# Patient Record
Sex: MALE | Race: WHITE | Employment: UNEMPLOYED
[De-identification: names, ages, dates, MRNs, and addresses within clinical notes are randomized per-mention and may not be internally consistent; named-entity substitution may affect disease eponyms.]

---

## 2017-02-07 ENCOUNTER — TELEPHONE (OUTPATIENT)
Dept: FAMILY MEDICINE CLINIC | Facility: CLINIC | Age: 27
End: 2017-02-07

## 2017-02-10 ENCOUNTER — OFFICE VISIT (OUTPATIENT)
Dept: FAMILY MEDICINE CLINIC | Facility: CLINIC | Age: 27
End: 2017-02-10

## 2017-02-10 VITALS
HEART RATE: 72 BPM | SYSTOLIC BLOOD PRESSURE: 127 MMHG | WEIGHT: 205.2 LBS | BODY MASS INDEX: 26.35 KG/M2 | DIASTOLIC BLOOD PRESSURE: 82 MMHG | RESPIRATION RATE: 16 BRPM | OXYGEN SATURATION: 97 %

## 2017-02-10 DIAGNOSIS — S12.9XXD: ICD-10-CM

## 2017-02-10 DIAGNOSIS — M54.12 CERVICAL RADICULOPATHY: Primary | ICD-10-CM

## 2017-02-10 PROCEDURE — 99213 OFFICE O/P EST LOW 20 MIN: CPT | Performed by: FAMILY MEDICINE

## 2017-02-10 RX ORDER — FLUOCINOLONE ACETONIDE 0.1 MG/ML
SOLUTION TOPICAL
Qty: 1 BOTTLE | Refills: 0 | Status: SHIPPED | OUTPATIENT
Start: 2017-02-10 | End: 2017-09-15

## 2017-02-10 RX ORDER — CLONIDINE HYDROCHLORIDE 0.2 MG/1
TABLET ORAL
Qty: 30 TABLET | Refills: 5 | Status: SHIPPED | OUTPATIENT
Start: 2017-02-10 | End: 2017-09-15

## 2017-02-10 RX ORDER — SERTRALINE HYDROCHLORIDE 100 MG/1
TABLET, FILM COATED ORAL
Qty: 60 TABLET | Refills: 3 | Status: SHIPPED | OUTPATIENT
Start: 2017-02-10 | End: 2022-07-21 | Stop reason: SDUPTHER

## 2017-02-10 RX ORDER — DIVALPROEX SODIUM 500 MG/1
1500 TABLET, DELAYED RELEASE ORAL DAILY
Qty: 90 TABLET | Refills: 3 | Status: SHIPPED | OUTPATIENT
Start: 2017-02-10 | End: 2017-09-15

## 2017-02-10 ASSESSMENT — ENCOUNTER SYMPTOMS
SHORTNESS OF BREATH: 0
BACK PAIN: 0
CONSTIPATION: 0
BLOOD IN STOOL: 0
WHEEZING: 0
COUGH: 0
ABDOMINAL PAIN: 0
DIARRHEA: 0

## 2017-03-13 DIAGNOSIS — S12.9XXD: ICD-10-CM

## 2017-03-13 DIAGNOSIS — M54.12 CERVICAL RADICULOPATHY: ICD-10-CM

## 2017-04-05 ENCOUNTER — OFFICE VISIT (OUTPATIENT)
Dept: FAMILY MEDICINE CLINIC | Age: 27
End: 2017-04-05
Payer: MEDICARE

## 2017-04-05 VITALS
HEART RATE: 83 BPM | WEIGHT: 199 LBS | BODY MASS INDEX: 25.55 KG/M2 | DIASTOLIC BLOOD PRESSURE: 70 MMHG | SYSTOLIC BLOOD PRESSURE: 130 MMHG

## 2017-04-05 DIAGNOSIS — Z82.49 FAMILY HISTORY OF ANEURYSM: Primary | ICD-10-CM

## 2017-04-05 DIAGNOSIS — M54.12 CERVICAL RADICULOPATHY DUE TO TRAUMA: ICD-10-CM

## 2017-04-05 PROCEDURE — 99213 OFFICE O/P EST LOW 20 MIN: CPT | Performed by: FAMILY MEDICINE

## 2017-04-05 ASSESSMENT — ENCOUNTER SYMPTOMS
ABDOMINAL PAIN: 0
CONSTIPATION: 0
COUGH: 0
BLOOD IN STOOL: 0
BACK PAIN: 1
WHEEZING: 0
DIARRHEA: 0
SHORTNESS OF BREATH: 0

## 2017-04-07 ENCOUNTER — TELEPHONE (OUTPATIENT)
Dept: FAMILY MEDICINE CLINIC | Age: 27
End: 2017-04-07

## 2017-04-11 ENCOUNTER — HOSPITAL ENCOUNTER (OUTPATIENT)
Dept: NON INVASIVE DIAGNOSTICS | Age: 27
Discharge: HOME | End: 2017-04-11
Payer: MEDICARE

## 2017-04-11 LAB
LV EF: 63 %
LVEF MODALITY: NORMAL

## 2017-04-11 PROCEDURE — 93306 TTE W/DOPPLER COMPLETE: CPT

## 2017-05-09 ENCOUNTER — INITIAL CONSULT (OUTPATIENT)
Dept: NEUROSURGERY | Age: 27
End: 2017-05-09
Payer: MEDICARE

## 2017-05-09 VITALS
DIASTOLIC BLOOD PRESSURE: 75 MMHG | WEIGHT: 206 LBS | SYSTOLIC BLOOD PRESSURE: 113 MMHG | HEART RATE: 69 BPM | BODY MASS INDEX: 26.44 KG/M2 | HEIGHT: 74 IN

## 2017-05-09 DIAGNOSIS — M54.2 NECK PAIN: Primary | ICD-10-CM

## 2017-05-09 PROCEDURE — 99243 OFF/OP CNSLTJ NEW/EST LOW 30: CPT | Performed by: NEUROLOGICAL SURGERY

## 2017-06-02 ENCOUNTER — OFFICE VISIT (OUTPATIENT)
Dept: FAMILY MEDICINE CLINIC | Age: 27
End: 2017-06-02
Payer: MEDICARE

## 2017-06-02 VITALS
BODY MASS INDEX: 25.94 KG/M2 | DIASTOLIC BLOOD PRESSURE: 64 MMHG | SYSTOLIC BLOOD PRESSURE: 118 MMHG | HEART RATE: 88 BPM | WEIGHT: 202 LBS

## 2017-06-02 DIAGNOSIS — R10.13 EPIGASTRIC PAIN: Primary | ICD-10-CM

## 2017-06-02 PROCEDURE — 99213 OFFICE O/P EST LOW 20 MIN: CPT | Performed by: FAMILY MEDICINE

## 2017-06-02 ASSESSMENT — PATIENT HEALTH QUESTIONNAIRE - PHQ9
1. LITTLE INTEREST OR PLEASURE IN DOING THINGS: 2
2. FEELING DOWN, DEPRESSED OR HOPELESS: 3
SUM OF ALL RESPONSES TO PHQ QUESTIONS 1-9: 18
3. TROUBLE FALLING OR STAYING ASLEEP: 2
5. POOR APPETITE OR OVEREATING: 1
8. MOVING OR SPEAKING SO SLOWLY THAT OTHER PEOPLE COULD HAVE NOTICED. OR THE OPPOSITE, BEING SO FIGETY OR RESTLESS THAT YOU HAVE BEEN MOVING AROUND A LOT MORE THAN USUAL: 2
9. THOUGHTS THAT YOU WOULD BE BETTER OFF DEAD, OR OF HURTING YOURSELF: 0
7. TROUBLE CONCENTRATING ON THINGS, SUCH AS READING THE NEWSPAPER OR WATCHING TELEVISION: 3
6. FEELING BAD ABOUT YOURSELF - OR THAT YOU ARE A FAILURE OR HAVE LET YOURSELF OR YOUR FAMILY DOWN: 3
10. IF YOU CHECKED OFF ANY PROBLEMS, HOW DIFFICULT HAVE THESE PROBLEMS MADE IT FOR YOU TO DO YOUR WORK, TAKE CARE OF THINGS AT HOME, OR GET ALONG WITH OTHER PEOPLE: 1
4. FEELING TIRED OR HAVING LITTLE ENERGY: 2
SUM OF ALL RESPONSES TO PHQ9 QUESTIONS 1 & 2: 5

## 2017-06-02 ASSESSMENT — ENCOUNTER SYMPTOMS
COUGH: 0
CONSTIPATION: 0
WHEEZING: 0
NAUSEA: 1
SHORTNESS OF BREATH: 0
BLOOD IN STOOL: 0
BACK PAIN: 0
DIARRHEA: 1
VOMITING: 1
ABDOMINAL PAIN: 0

## 2017-09-15 ENCOUNTER — OFFICE VISIT (OUTPATIENT)
Dept: FAMILY MEDICINE CLINIC | Age: 27
End: 2017-09-15
Payer: MEDICARE

## 2017-09-15 VITALS
HEIGHT: 74 IN | BODY MASS INDEX: 25.28 KG/M2 | RESPIRATION RATE: 18 BRPM | HEART RATE: 88 BPM | DIASTOLIC BLOOD PRESSURE: 90 MMHG | SYSTOLIC BLOOD PRESSURE: 120 MMHG | OXYGEN SATURATION: 96 % | WEIGHT: 197 LBS | TEMPERATURE: 98.3 F

## 2017-09-15 DIAGNOSIS — Z76.89 ENCOUNTER TO ESTABLISH CARE: ICD-10-CM

## 2017-09-15 DIAGNOSIS — Z13.31 POSITIVE DEPRESSION SCREENING: ICD-10-CM

## 2017-09-15 DIAGNOSIS — F11.11: Primary | ICD-10-CM

## 2017-09-15 PROCEDURE — G8431 POS CLIN DEPRES SCRN F/U DOC: HCPCS | Performed by: NURSE PRACTITIONER

## 2017-09-15 PROCEDURE — 99213 OFFICE O/P EST LOW 20 MIN: CPT | Performed by: NURSE PRACTITIONER

## 2017-09-15 RX ORDER — GABAPENTIN 100 MG/1
CAPSULE ORAL
Refills: 0 | COMMUNITY
Start: 2017-08-18 | End: 2017-09-15

## 2017-09-15 RX ORDER — NALTREXONE 380 MG
KIT INTRAMUSCULAR
COMMUNITY
Start: 2017-08-03 | End: 2017-09-15

## 2017-09-15 RX ORDER — GABAPENTIN 300 MG/1
CAPSULE ORAL
Refills: 0 | COMMUNITY
Start: 2017-09-08 | End: 2017-11-10 | Stop reason: DRUGHIGH

## 2017-09-15 ASSESSMENT — ENCOUNTER SYMPTOMS
SHORTNESS OF BREATH: 0
COUGH: 0
NAUSEA: 0
DIARRHEA: 0
ABDOMINAL PAIN: 0
CONSTIPATION: 0
CHEST TIGHTNESS: 0

## 2017-10-13 ENCOUNTER — OFFICE VISIT (OUTPATIENT)
Dept: FAMILY MEDICINE CLINIC | Age: 27
End: 2017-10-13
Payer: MEDICARE

## 2017-10-13 DIAGNOSIS — F19.20 POLYSUBSTANCE (EXCLUDING OPIOIDS) DEPENDENCE (HCC): Primary | ICD-10-CM

## 2017-10-13 LAB
AMPHETAMINE SCREEN, URINE: NORMAL
BARBITURATE SCREEN, URINE: NORMAL
BENZODIAZEPINE SCREEN, URINE: NORMAL
COCAINE METABOLITE SCREEN URINE: NORMAL
MDMA URINE: NORMAL
METHADONE SCREEN, URINE: NORMAL
METHAMPHETAMINE, URINE: NORMAL
OPIATE SCREEN URINE: NORMAL
OXYCODONE SCREEN URINE: NORMAL
PHENCYCLIDINE SCREEN URINE: NORMAL
PROPOXYPHENE SCREEN, URINE: NORMAL
THC: NORMAL
TRICYCLIC ANTIDEPRESSANTS, UR: NORMAL

## 2017-10-13 PROCEDURE — 80305 DRUG TEST PRSMV DIR OPT OBS: CPT | Performed by: NURSE PRACTITIONER

## 2017-10-13 PROCEDURE — G8428 CUR MEDS NOT DOCUMENT: HCPCS | Performed by: NURSE PRACTITIONER

## 2017-10-13 PROCEDURE — 96372 THER/PROPH/DIAG INJ SC/IM: CPT | Performed by: NURSE PRACTITIONER

## 2017-10-13 PROCEDURE — G8417 CALC BMI ABV UP PARAM F/U: HCPCS | Performed by: NURSE PRACTITIONER

## 2017-10-13 NOTE — PROGRESS NOTES
After obtaining consent, and per orders of Jaylen Sanz, injection of vivitrol given in Left vaentrogluteal by Bridgett Briscoe. Patient instructed to remain in clinic for 20 minutes afterwards, and to report any adverse reaction to me immediately.

## 2017-11-10 ENCOUNTER — OFFICE VISIT (OUTPATIENT)
Dept: FAMILY MEDICINE CLINIC | Age: 27
End: 2017-11-10
Payer: MEDICARE

## 2017-11-10 VITALS
TEMPERATURE: 97.4 F | SYSTOLIC BLOOD PRESSURE: 132 MMHG | HEIGHT: 74 IN | HEART RATE: 91 BPM | BODY MASS INDEX: 26.31 KG/M2 | DIASTOLIC BLOOD PRESSURE: 86 MMHG | OXYGEN SATURATION: 96 % | RESPIRATION RATE: 20 BRPM | WEIGHT: 205 LBS

## 2017-11-10 DIAGNOSIS — F11.11 OPIOID ABUSE, IN REMISSION (HCC): Primary | ICD-10-CM

## 2017-11-10 LAB
AMPHETAMINE SCREEN, URINE: ABNORMAL
BARBITURATE SCREEN, URINE: ABNORMAL
BENZODIAZEPINE SCREEN, URINE: ABNORMAL
COCAINE METABOLITE SCREEN URINE: ABNORMAL
MDMA URINE: ABNORMAL
METHADONE SCREEN, URINE: ABNORMAL
METHAMPHETAMINE, URINE: ABNORMAL
OPIATE SCREEN URINE: ABNORMAL
OXYCODONE SCREEN URINE: ABNORMAL
PHENCYCLIDINE SCREEN URINE: ABNORMAL
PROPOXYPHENE SCREEN, URINE: ABNORMAL
THC: ABNORMAL
TRICYCLIC ANTIDEPRESSANTS, UR: ABNORMAL

## 2017-11-10 PROCEDURE — 99213 OFFICE O/P EST LOW 20 MIN: CPT | Performed by: NURSE PRACTITIONER

## 2017-11-10 PROCEDURE — G8484 FLU IMMUNIZE NO ADMIN: HCPCS | Performed by: NURSE PRACTITIONER

## 2017-11-10 PROCEDURE — G8427 DOCREV CUR MEDS BY ELIG CLIN: HCPCS | Performed by: NURSE PRACTITIONER

## 2017-11-10 PROCEDURE — 80305 DRUG TEST PRSMV DIR OPT OBS: CPT | Performed by: NURSE PRACTITIONER

## 2017-11-10 PROCEDURE — G8417 CALC BMI ABV UP PARAM F/U: HCPCS | Performed by: NURSE PRACTITIONER

## 2017-11-10 PROCEDURE — 4004F PT TOBACCO SCREEN RCVD TLK: CPT | Performed by: NURSE PRACTITIONER

## 2017-11-10 RX ORDER — GABAPENTIN 600 MG/1
TABLET ORAL
Refills: 0 | COMMUNITY
Start: 2017-10-30 | End: 2022-05-26 | Stop reason: SDUPTHER

## 2017-11-10 RX ORDER — NALTREXONE 380 MG
KIT INTRAMUSCULAR
COMMUNITY
Start: 2017-10-26 | End: 2018-02-27 | Stop reason: SDUPTHER

## 2017-11-10 NOTE — PROGRESS NOTES
patient is not nervous/anxious. Other pertinent ROS in HPI  Objective:     /86 (Site: Right Arm, Position: Sitting, Cuff Size: Medium Adult)   Pulse 91   Temp 97.4 °F (36.3 °C) (Oral)   Resp 20   Ht 6' 2.02\" (1.88 m)   Wt 205 lb (93 kg)   SpO2 96%   BMI 26.31 kg/m²      Physical Exam   Constitutional: He is oriented to person, place, and time. He appears well-developed and well-nourished. HENT:   Head: Normocephalic and atraumatic. Right Ear: External ear normal.   Left Ear: External ear normal.   Nose: Nose normal.   Mouth/Throat: Oropharynx is clear and moist.   Eyes: Conjunctivae and EOM are normal. Pupils are equal, round, and reactive to light. Neck: Normal range of motion. Cardiovascular: Normal rate, regular rhythm and normal heart sounds. Pulmonary/Chest: Effort normal and breath sounds normal.   Abdominal: Soft. Bowel sounds are normal.   Musculoskeletal: Normal range of motion. Pain free ROM     Neurological: He is alert and oriented to person, place, and time. Gait normal     Skin: Skin is warm and dry. No rash noted. Psychiatric: He has a normal mood and affect. His behavior is normal. Judgment and thought content normal.       Assessment:       1. Opioid abuse, in remission  VIVITROL 380 MG injection       Plan:      1. Opioid abuse, in remission  Continue vivotrol  Continue going to racing for recovery  - VIVITROL 380 MG injection;     RTO if symptoms worsen or fail to improve  Pt agreeable with plan      Patient given educational materials - see patient instructions. Discussed use, benefit, and side effects of prescribed medications. All patient questions answered. Pt voiced understanding. Reviewed health maintenance. Instructed to continue current medications, diet and exercise. 1.  Dania received counseling on the following healthy behaviors: nutrition, exercise and medication adherence  2.   Patient given educational materials when available - see patient instructions when applicable  3. Discussed use, benefit, and side effects of prescribed medications. Barriers to medication compliance addressed. All patient questions answered. Pt voiced understanding. 4.  Reviewed prior labs and health maintenance  5. Continue current medications, diet and exercise.     Completed Refills   Requested Prescriptions      No prescriptions requested or ordered in this encounter         Electronically signed by Luciano Greenfield CNP on 11/10/2017 at 1:33 PM

## 2017-11-10 NOTE — PROGRESS NOTES
Have you seen any other physician or provider since your last visit no    Have you had any other diagnostic tests since your last visit? no    Have you changed or stopped any medications since your last visit including any over-the-counter medicines, vitamins, or herbal medicines? yno     Are you taking all your prescribed medications? Yes  If NO, why? -  n/a           Patient Self-Management Goal for this visit.    What is your goal for your visit today? - vivitrol   Barriers to success: none   Plan for overcoming my barriers: N/A      Confidence: 10/10   Date goal set: 11/10/17   Date expected to reach goal: 3days    Medical history Review  Past Medical, Family, and Social History reviewed and does not contribute to the patient presenting condition    Health Maintenance Due   Topic Date Due    HIV screen  03/11/2004    DTaP/Tdap/Td vaccine (1 - Tdap) 03/11/2008    Pneumococcal med risk (1 of 1 - PPSV23) 03/11/2008

## 2017-12-08 ENCOUNTER — NURSE ONLY (OUTPATIENT)
Dept: FAMILY MEDICINE CLINIC | Age: 27
End: 2017-12-08
Payer: MEDICARE

## 2017-12-08 VITALS — TEMPERATURE: 97.5 F

## 2017-12-08 DIAGNOSIS — F19.20 POLYSUBSTANCE (EXCLUDING OPIOIDS) DEPENDENCE (HCC): Primary | ICD-10-CM

## 2017-12-08 PROCEDURE — 96372 THER/PROPH/DIAG INJ SC/IM: CPT | Performed by: NURSE PRACTITIONER

## 2017-12-08 PROCEDURE — 80305 DRUG TEST PRSMV DIR OPT OBS: CPT | Performed by: NURSE PRACTITIONER

## 2017-12-08 NOTE — PROGRESS NOTES
After obtaining consent, and per orders of Talia Sánchez, injection of vivitrol given in left dorso gluteal by Delvis Echevarria. Patient instructed to remain in clinic for 20 minutes afterwards, and to report any adverse reaction to me immediately.

## 2018-01-05 ENCOUNTER — OFFICE VISIT (OUTPATIENT)
Dept: FAMILY MEDICINE CLINIC | Age: 28
End: 2018-01-05
Payer: MEDICARE

## 2018-01-05 VITALS
SYSTOLIC BLOOD PRESSURE: 132 MMHG | OXYGEN SATURATION: 97 % | HEART RATE: 64 BPM | TEMPERATURE: 97.4 F | RESPIRATION RATE: 20 BRPM | BODY MASS INDEX: 25.67 KG/M2 | WEIGHT: 200 LBS | HEIGHT: 74 IN | DIASTOLIC BLOOD PRESSURE: 78 MMHG

## 2018-01-05 DIAGNOSIS — K21.9 GASTROESOPHAGEAL REFLUX DISEASE, ESOPHAGITIS PRESENCE NOT SPECIFIED: ICD-10-CM

## 2018-01-05 DIAGNOSIS — F11.11 OPIOID ABUSE, IN REMISSION (HCC): Primary | ICD-10-CM

## 2018-01-05 PROCEDURE — G8427 DOCREV CUR MEDS BY ELIG CLIN: HCPCS | Performed by: NURSE PRACTITIONER

## 2018-01-05 PROCEDURE — 4004F PT TOBACCO SCREEN RCVD TLK: CPT | Performed by: NURSE PRACTITIONER

## 2018-01-05 PROCEDURE — G8417 CALC BMI ABV UP PARAM F/U: HCPCS | Performed by: NURSE PRACTITIONER

## 2018-01-05 PROCEDURE — G8484 FLU IMMUNIZE NO ADMIN: HCPCS | Performed by: NURSE PRACTITIONER

## 2018-01-05 PROCEDURE — 80305 DRUG TEST PRSMV DIR OPT OBS: CPT | Performed by: NURSE PRACTITIONER

## 2018-01-05 PROCEDURE — 99213 OFFICE O/P EST LOW 20 MIN: CPT | Performed by: NURSE PRACTITIONER

## 2018-01-05 RX ORDER — RANITIDINE 150 MG/1
150 TABLET ORAL NIGHTLY
Qty: 30 TABLET | Refills: 3 | Status: SHIPPED | OUTPATIENT
Start: 2018-01-05 | End: 2018-03-02 | Stop reason: SDUPTHER

## 2018-01-05 ASSESSMENT — ENCOUNTER SYMPTOMS
CONSTIPATION: 0
DIARRHEA: 0
COUGH: 0
SHORTNESS OF BREATH: 0
ABDOMINAL PAIN: 0
BLOOD IN STOOL: 0

## 2018-01-05 NOTE — PROGRESS NOTES
Have you seen any other physician or provider since your last visit yes - Psych    Have you had any other diagnostic tests since your last visit? no    Have you changed or stopped any medications since your last visit including any over-the-counter medicines, vitamins, or herbal medicines? no     Are you taking all your prescribed medications? Yes  If NO, why? -  N/A           Patient Self-Management Goal for this visit.    What is your goal for your visit today? - 28 day follow up/injection   Barriers to success: none   Plan for overcoming my barriers: N/A      Confidence: 10/10   Date goal set: 1/5/18   Date expected to reach goal: 2days    Medical history Review  Past Medical, Family, and Social History reviewed and does not contribute to the patient presenting condition    Health Maintenance Due   Topic Date Due    HIV screen  03/11/2004    DTaP/Tdap/Td vaccine (1 - Tdap) 03/11/2008    Pneumococcal med risk (1 of 1 - PPSV23) 03/11/2008    Flu vaccine (1) 09/01/2017

## 2018-02-02 ENCOUNTER — NURSE ONLY (OUTPATIENT)
Dept: FAMILY MEDICINE CLINIC | Age: 28
End: 2018-02-02
Payer: MEDICARE

## 2018-02-02 DIAGNOSIS — F19.20 POLYSUBSTANCE (EXCLUDING OPIOIDS) DEPENDENCE (HCC): Primary | ICD-10-CM

## 2018-02-02 PROCEDURE — 96372 THER/PROPH/DIAG INJ SC/IM: CPT | Performed by: NURSE PRACTITIONER

## 2018-02-02 PROCEDURE — 80305 DRUG TEST PRSMV DIR OPT OBS: CPT | Performed by: NURSE PRACTITIONER

## 2018-02-02 NOTE — PROGRESS NOTES
After obtaining consent, and per orders of Dr. Belgica Morgan, injection of vivitrol given in right gluteus  by Dhruv Delaney. Patient instructed to remain in clinic for 20 minutes afterwards, and to report any adverse reaction to me immediately.

## 2018-02-27 DIAGNOSIS — F11.11 OPIOID ABUSE, IN REMISSION (HCC): ICD-10-CM

## 2018-02-27 RX ORDER — NALTREXONE 380 MG
380 KIT INTRAMUSCULAR ONCE
Qty: 1.2 EACH | Refills: 3 | Status: SHIPPED | OUTPATIENT
Start: 2018-02-27 | End: 2018-02-27

## 2018-02-27 NOTE — TELEPHONE ENCOUNTER
Next appt. 03/02/2018      Next Visit Date:  Future Appointments  Date Time Provider Nicolas dAen   3/2/2018 1:15 PM Lisette Rice, [facility] [address] [address] [employer] Maintenance   Topic Date Due    HIV screen  03/11/2004    DTaP/Tdap/Td vaccine (1 - Tdap) 03/11/2008    Pneumococcal med risk (1 of 1 - PPSV23) 03/11/2008    Flu vaccine (1) 09/01/2017       No results found for: LABA1C          ( goal A1C is < 7)   No results found for: LABMICR  LDL Calculated (mg/dL)   Date Value   09/21/2016 105       (goal LDL is <100)   AST (U/L)   Date Value   03/09/2016 31     ALT (U/L)   Date Value   03/09/2016 28     BUN (mg/dL)   Date Value   03/09/2016 17     BP Readings from Last 3 Encounters:   01/05/18 132/78   11/10/17 132/86   09/15/17 (!) 120/90          (goal 120/80)    All Future Testing planned in [facility]  Lab Frequency Next Occurrence   XR Cervical Spine Flexion and Extension Once 05/09/2017   CT CERVICAL SPINE WO CONTRAST Once 05/09/2017               Patient Active Problem List:     Respiratory insufficiency     Traumatic brain injury [facility])     PTSD (post-traumatic stress disorder)     Polysubstance (excluding opioids) dependence ([facility] [address]     Suicidal ideation     Depression

## 2018-03-02 ENCOUNTER — OFFICE VISIT (OUTPATIENT)
Dept: FAMILY MEDICINE CLINIC | Age: 28
End: 2018-03-02
Payer: MEDICARE

## 2018-03-02 VITALS
SYSTOLIC BLOOD PRESSURE: 134 MMHG | DIASTOLIC BLOOD PRESSURE: 78 MMHG | TEMPERATURE: 96.6 F | HEIGHT: 74 IN | BODY MASS INDEX: 25.93 KG/M2 | WEIGHT: 202 LBS | OXYGEN SATURATION: 98 % | HEART RATE: 73 BPM | RESPIRATION RATE: 20 BRPM

## 2018-03-02 DIAGNOSIS — K21.9 GASTROESOPHAGEAL REFLUX DISEASE, ESOPHAGITIS PRESENCE NOT SPECIFIED: ICD-10-CM

## 2018-03-02 DIAGNOSIS — F11.11 OPIOID ABUSE, IN REMISSION (HCC): Primary | ICD-10-CM

## 2018-03-02 PROCEDURE — 80305 DRUG TEST PRSMV DIR OPT OBS: CPT | Performed by: NURSE PRACTITIONER

## 2018-03-02 PROCEDURE — 99213 OFFICE O/P EST LOW 20 MIN: CPT | Performed by: NURSE PRACTITIONER

## 2018-03-02 PROCEDURE — 4004F PT TOBACCO SCREEN RCVD TLK: CPT | Performed by: NURSE PRACTITIONER

## 2018-03-02 PROCEDURE — G8427 DOCREV CUR MEDS BY ELIG CLIN: HCPCS | Performed by: NURSE PRACTITIONER

## 2018-03-02 PROCEDURE — G8417 CALC BMI ABV UP PARAM F/U: HCPCS | Performed by: NURSE PRACTITIONER

## 2018-03-02 PROCEDURE — G8484 FLU IMMUNIZE NO ADMIN: HCPCS | Performed by: NURSE PRACTITIONER

## 2018-03-02 RX ORDER — FLUOCINOLONE ACETONIDE 0.1 MG/ML
SOLUTION TOPICAL 2 TIMES DAILY
COMMUNITY
End: 2018-03-02 | Stop reason: SDUPTHER

## 2018-03-02 RX ORDER — RANITIDINE 150 MG/1
150 TABLET ORAL NIGHTLY
Qty: 30 TABLET | Refills: 5 | Status: SHIPPED | OUTPATIENT
Start: 2018-03-02 | End: 2019-02-25

## 2018-03-02 RX ORDER — FLUOCINOLONE ACETONIDE 0.1 MG/ML
SOLUTION TOPICAL 2 TIMES DAILY
Qty: 1 BOTTLE | Refills: 3 | Status: SHIPPED | OUTPATIENT
Start: 2018-03-02 | End: 2018-07-26

## 2018-03-02 RX ORDER — VENLAFAXINE HYDROCHLORIDE 37.5 MG/1
CAPSULE, EXTENDED RELEASE ORAL
Refills: 0 | COMMUNITY
Start: 2018-02-01 | End: 2018-05-07 | Stop reason: DRUGHIGH

## 2018-03-02 NOTE — PROGRESS NOTES
Visit Information    Have you changed or started any medications since your last visit including any over-the-counter medicines, vitamins, or herbal medicines? no   Have you stopped taking any of your medications? Is so, why? -  no  Are you having any side effects from any of your medications? - no    Have you seen any other physician or provider since your last visit? yes - psych   Have you had any other diagnostic tests since your last visit?  no   Have you been seen in the emergency room and/or had an admission in a hospital since we last saw you?  no   Have you had your routine dental cleaning in the past 6 months?  no     Do you have an active MyChart account? If no, what is the barrier?   Yes    Patient Care Team:  Sandra Seymour CNP as PCP - General (Certified Nurse Practitioner)    Medical History Review  Past Medical, Family, and Social History reviewed and does not contribute to the patient presenting condition    Health Maintenance   Topic Date Due    HIV screen  03/11/2004    DTaP/Tdap/Td vaccine (1 - Tdap) 03/11/2008    Pneumococcal med risk (1 of 1 - PPSV23) 03/11/2008    Flu vaccine (1) 09/01/2017

## 2018-03-05 ENCOUNTER — TELEPHONE (OUTPATIENT)
Dept: FAMILY MEDICINE CLINIC | Age: 28
End: 2018-03-05

## 2018-03-06 ENCOUNTER — TELEPHONE (OUTPATIENT)
Dept: FAMILY MEDICINE CLINIC | Age: 28
End: 2018-03-06

## 2018-04-02 ENCOUNTER — NURSE ONLY (OUTPATIENT)
Dept: FAMILY MEDICINE CLINIC | Age: 28
End: 2018-04-02
Payer: MEDICARE

## 2018-04-02 DIAGNOSIS — F11.11 OPIOID ABUSE, IN REMISSION (HCC): Primary | ICD-10-CM

## 2018-04-02 PROCEDURE — 80305 DRUG TEST PRSMV DIR OPT OBS: CPT | Performed by: NURSE PRACTITIONER

## 2018-04-02 PROCEDURE — 96372 THER/PROPH/DIAG INJ SC/IM: CPT | Performed by: NURSE PRACTITIONER

## 2018-04-30 ENCOUNTER — HOSPITAL ENCOUNTER (OUTPATIENT)
Age: 28
Setting detail: SPECIMEN
Discharge: HOME | End: 2018-04-30
Payer: MEDICARE

## 2018-04-30 ENCOUNTER — OFFICE VISIT (OUTPATIENT)
Dept: FAMILY MEDICINE CLINIC | Age: 28
End: 2018-04-30
Payer: MEDICARE

## 2018-04-30 VITALS
HEIGHT: 74 IN | DIASTOLIC BLOOD PRESSURE: 81 MMHG | WEIGHT: 202.4 LBS | TEMPERATURE: 97 F | BODY MASS INDEX: 25.98 KG/M2 | SYSTOLIC BLOOD PRESSURE: 117 MMHG | HEART RATE: 87 BPM

## 2018-04-30 DIAGNOSIS — M79.675 CHRONIC TOE PAIN, LEFT FOOT: ICD-10-CM

## 2018-04-30 DIAGNOSIS — I72.0 CAROTID ANEURYSM, LEFT (HCC): ICD-10-CM

## 2018-04-30 DIAGNOSIS — F11.11 OPIOID ABUSE, IN REMISSION (HCC): ICD-10-CM

## 2018-04-30 DIAGNOSIS — G89.29 CHRONIC NECK PAIN: Primary | ICD-10-CM

## 2018-04-30 DIAGNOSIS — G89.29 CHRONIC TOE PAIN, LEFT FOOT: ICD-10-CM

## 2018-04-30 DIAGNOSIS — M54.2 CHRONIC NECK PAIN: Primary | ICD-10-CM

## 2018-04-30 LAB
6-ACETYLMORPHINE, UR: NORMAL
ALBUMIN SERPL-MCNC: 4.7 G/DL (ref 3.5–5.2)
ALBUMIN/GLOBULIN RATIO: 1.4 (ref 1–2.5)
ALP BLD-CCNC: 95 U/L (ref 40–129)
ALT SERPL-CCNC: 42 U/L (ref 5–41)
AMPHETAMINE SCREEN, URINE: NORMAL
ANION GAP SERPL CALCULATED.3IONS-SCNC: 12 MMOL/L (ref 9–17)
AST SERPL-CCNC: 35 U/L
BARBITURATE SCREEN, URINE: NORMAL
BENZODIAZEPINE SCREEN, URINE: NORMAL
BILIRUB SERPL-MCNC: 0.2 MG/DL (ref 0.3–1.2)
BUN BLDV-MCNC: 14 MG/DL (ref 6–20)
BUN/CREAT BLD: ABNORMAL (ref 9–20)
CALCIUM SERPL-MCNC: 9.5 MG/DL (ref 8.6–10.4)
CANNABINOID SCREEN URINE: POSITIVE
CHLORIDE BLD-SCNC: 103 MMOL/L (ref 98–107)
CO2: 23 MMOL/L (ref 20–31)
COCAINE METABOLITE, URINE: NORMAL
CREAT SERPL-MCNC: 0.75 MG/DL (ref 0.7–1.2)
CREATININE URINE: NORMAL MG/DL
EDDP, URINE: NORMAL
ETHANOL URINE: NORMAL
GFR AFRICAN AMERICAN: >60 ML/MIN
GFR NON-AFRICAN AMERICAN: >60 ML/MIN
GFR SERPL CREATININE-BSD FRML MDRD: ABNORMAL ML/MIN/{1.73_M2}
GFR SERPL CREATININE-BSD FRML MDRD: ABNORMAL ML/MIN/{1.73_M2}
GLUCOSE BLD-MCNC: 87 MG/DL (ref 70–99)
HCT VFR BLD CALC: 46.7 % (ref 40.7–50.3)
HEMOGLOBIN: 15 G/DL (ref 13–17)
MCH RBC QN AUTO: 29 PG (ref 25.2–33.5)
MCHC RBC AUTO-ENTMCNC: 32.1 G/DL (ref 28.4–34.8)
MCV RBC AUTO: 90.3 FL (ref 82.6–102.9)
MDMA URINE: NORMAL
METHADONE SCREEN, URINE: NORMAL
METHAMPHETAMINE, URINE: NORMAL
NRBC AUTOMATED: 0 PER 100 WBC
OPIATES, URINE: NORMAL
OXYCODONE: NORMAL
PCP: NORMAL
PDW BLD-RTO: 14.6 % (ref 11.8–14.4)
PH, URINE: NORMAL
PHENCYCLIDINE, URINE: NORMAL
PLATELET # BLD: 499 K/UL (ref 138–453)
PMV BLD AUTO: 11 FL (ref 8.1–13.5)
POTASSIUM SERPL-SCNC: 4.8 MMOL/L (ref 3.7–5.3)
PROPOXYPHENE, URINE: NORMAL
RBC # BLD: 5.17 M/UL (ref 4.21–5.77)
SODIUM BLD-SCNC: 138 MMOL/L (ref 135–144)
TOTAL PROTEIN: 8.1 G/DL (ref 6.4–8.3)
TRICYCLIC ANTIDEPRESSANTS, UR: NORMAL
URIC ACID: 5.4 MG/DL (ref 3.4–7)
WBC # BLD: 10.7 K/UL (ref 3.5–11.3)

## 2018-04-30 PROCEDURE — 4004F PT TOBACCO SCREEN RCVD TLK: CPT | Performed by: NURSE PRACTITIONER

## 2018-04-30 PROCEDURE — G8427 DOCREV CUR MEDS BY ELIG CLIN: HCPCS | Performed by: NURSE PRACTITIONER

## 2018-04-30 PROCEDURE — G8417 CALC BMI ABV UP PARAM F/U: HCPCS | Performed by: NURSE PRACTITIONER

## 2018-04-30 PROCEDURE — 99214 OFFICE O/P EST MOD 30 MIN: CPT | Performed by: NURSE PRACTITIONER

## 2018-04-30 RX ORDER — OMEPRAZOLE 20 MG/1
20 CAPSULE, DELAYED RELEASE ORAL DAILY
Qty: 30 CAPSULE | Refills: 3 | Status: SHIPPED | OUTPATIENT
Start: 2018-04-30 | End: 2019-04-26 | Stop reason: SDUPTHER

## 2018-04-30 ASSESSMENT — ENCOUNTER SYMPTOMS: BACK PAIN: 1

## 2018-05-07 ENCOUNTER — OFFICE VISIT (OUTPATIENT)
Dept: ORTHOPEDIC SURGERY | Age: 28
End: 2018-05-07
Payer: MEDICARE

## 2018-05-07 ENCOUNTER — HOSPITAL ENCOUNTER (OUTPATIENT)
Dept: GENERAL RADIOLOGY | Facility: CLINIC | Age: 28
Discharge: HOME | End: 2018-05-09
Payer: MEDICARE

## 2018-05-07 VITALS
HEIGHT: 74 IN | WEIGHT: 190 LBS | BODY MASS INDEX: 24.38 KG/M2 | SYSTOLIC BLOOD PRESSURE: 144 MMHG | HEART RATE: 82 BPM | DIASTOLIC BLOOD PRESSURE: 97 MMHG

## 2018-05-07 DIAGNOSIS — Z87.81 HISTORY OF CERVICAL FRACTURE: ICD-10-CM

## 2018-05-07 DIAGNOSIS — Z87.81 HISTORY OF CERVICAL FRACTURE: Primary | ICD-10-CM

## 2018-05-07 DIAGNOSIS — M53.2X2 CERVICAL SPINE INSTABILITY: ICD-10-CM

## 2018-05-07 PROCEDURE — 99203 OFFICE O/P NEW LOW 30 MIN: CPT | Performed by: FAMILY MEDICINE

## 2018-05-07 PROCEDURE — 72040 X-RAY EXAM NECK SPINE 2-3 VW: CPT

## 2018-05-07 PROCEDURE — G8427 DOCREV CUR MEDS BY ELIG CLIN: HCPCS | Performed by: FAMILY MEDICINE

## 2018-05-07 PROCEDURE — G8420 CALC BMI NORM PARAMETERS: HCPCS | Performed by: FAMILY MEDICINE

## 2018-05-07 PROCEDURE — 4004F PT TOBACCO SCREEN RCVD TLK: CPT | Performed by: FAMILY MEDICINE

## 2018-05-07 RX ORDER — NALTREXONE 380 MG
KIT INTRAMUSCULAR
COMMUNITY
Start: 2018-04-23 | End: 2018-05-29

## 2018-05-07 RX ORDER — VENLAFAXINE HYDROCHLORIDE 150 MG/1
CAPSULE, EXTENDED RELEASE ORAL
Refills: 0 | COMMUNITY
Start: 2018-04-03 | End: 2022-07-21

## 2018-05-14 ENCOUNTER — HOSPITAL ENCOUNTER (OUTPATIENT)
Dept: CT IMAGING | Age: 28
Discharge: HOME | End: 2018-05-16
Payer: MEDICARE

## 2018-05-14 DIAGNOSIS — I72.0 CAROTID ANEURYSM, LEFT (HCC): ICD-10-CM

## 2018-05-14 PROCEDURE — 2580000003 HC RX 258: Performed by: NURSE PRACTITIONER

## 2018-05-14 PROCEDURE — 6360000004 HC RX CONTRAST MEDICATION: Performed by: NURSE PRACTITIONER

## 2018-05-14 PROCEDURE — 70498 CT ANGIOGRAPHY NECK: CPT

## 2018-05-14 RX ORDER — SODIUM CHLORIDE 0.9 % (FLUSH) 0.9 %
10 SYRINGE (ML) INJECTION PRN
Status: DISCONTINUED | OUTPATIENT
Start: 2018-05-14 | End: 2018-05-17 | Stop reason: HOSPADM

## 2018-05-14 RX ORDER — 0.9 % SODIUM CHLORIDE 0.9 %
80 INTRAVENOUS SOLUTION INTRAVENOUS ONCE
Status: COMPLETED | OUTPATIENT
Start: 2018-05-14 | End: 2018-05-14

## 2018-05-14 RX ADMIN — Medication 10 ML: at 15:10

## 2018-05-14 RX ADMIN — SODIUM CHLORIDE 80 ML: 9 INJECTION, SOLUTION INTRAVENOUS at 15:09

## 2018-05-14 RX ADMIN — IOPAMIDOL 80 ML: 755 INJECTION, SOLUTION INTRAVENOUS at 15:09

## 2018-05-16 DIAGNOSIS — R93.89 ABNORMAL CT SCAN: Primary | ICD-10-CM

## 2018-05-17 ENCOUNTER — HOSPITAL ENCOUNTER (OUTPATIENT)
Dept: MRI IMAGING | Facility: CLINIC | Age: 28
Discharge: HOME | End: 2018-05-19
Payer: MEDICARE

## 2018-05-17 DIAGNOSIS — M53.2X2 CERVICAL SPINE INSTABILITY: ICD-10-CM

## 2018-05-17 DIAGNOSIS — M79.672 LEFT FOOT PAIN: Primary | ICD-10-CM

## 2018-05-17 DIAGNOSIS — Z87.81 HISTORY OF CERVICAL FRACTURE: ICD-10-CM

## 2018-05-17 PROCEDURE — 72141 MRI NECK SPINE W/O DYE: CPT

## 2018-05-22 ENCOUNTER — HOSPITAL ENCOUNTER (OUTPATIENT)
Dept: GENERAL RADIOLOGY | Facility: CLINIC | Age: 28
Discharge: HOME | End: 2018-05-24
Payer: MEDICARE

## 2018-05-22 ENCOUNTER — OFFICE VISIT (OUTPATIENT)
Dept: ORTHOPEDIC SURGERY | Age: 28
End: 2018-05-22
Payer: MEDICARE

## 2018-05-22 VITALS
HEART RATE: 89 BPM | HEIGHT: 74 IN | BODY MASS INDEX: 24.38 KG/M2 | DIASTOLIC BLOOD PRESSURE: 88 MMHG | WEIGHT: 190 LBS | SYSTOLIC BLOOD PRESSURE: 131 MMHG
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
## 2018-05-22 DIAGNOSIS — M53.2X2 CERVICAL SPINE INSTABILITY: Primary | ICD-10-CM

## 2018-05-22 DIAGNOSIS — S16.1XXA REPETITIVE STRAIN INJURY OF CERVICAL SPINE, INITIAL ENCOUNTER: ICD-10-CM

## 2018-05-22 DIAGNOSIS — M79.672 LEFT FOOT PAIN: ICD-10-CM

## 2018-05-22 PROCEDURE — G8420 CALC BMI NORM PARAMETERS: HCPCS | Performed by: FAMILY MEDICINE

## 2018-05-22 PROCEDURE — 99213 OFFICE O/P EST LOW 20 MIN: CPT | Performed by: FAMILY MEDICINE

## 2018-05-22 PROCEDURE — 4004F PT TOBACCO SCREEN RCVD TLK: CPT | Performed by: FAMILY MEDICINE

## 2018-05-22 PROCEDURE — G8427 DOCREV CUR MEDS BY ELIG CLIN: HCPCS | Performed by: FAMILY MEDICINE

## 2018-05-22 PROCEDURE — 73630 X-RAY EXAM OF FOOT: CPT

## 2018-05-29 ENCOUNTER — OFFICE VISIT (OUTPATIENT)
Dept: FAMILY MEDICINE CLINIC | Age: 28
End: 2018-05-29
Payer: MEDICARE

## 2018-05-29 VITALS
RESPIRATION RATE: 20 BRPM | HEIGHT: 74 IN | TEMPERATURE: 97.1 F | DIASTOLIC BLOOD PRESSURE: 80 MMHG | HEART RATE: 92 BPM | WEIGHT: 208 LBS | SYSTOLIC BLOOD PRESSURE: 124 MMHG | OXYGEN SATURATION: 96 % | BODY MASS INDEX: 26.69 KG/M2

## 2018-05-29 DIAGNOSIS — M79.672 CHRONIC FOOT PAIN, LEFT: ICD-10-CM

## 2018-05-29 DIAGNOSIS — G89.29 CHRONIC FOOT PAIN, LEFT: ICD-10-CM

## 2018-05-29 DIAGNOSIS — F11.11 OPIOID ABUSE, IN REMISSION (HCC): Primary | ICD-10-CM

## 2018-05-29 PROCEDURE — 4004F PT TOBACCO SCREEN RCVD TLK: CPT | Performed by: NURSE PRACTITIONER

## 2018-05-29 PROCEDURE — G8417 CALC BMI ABV UP PARAM F/U: HCPCS | Performed by: NURSE PRACTITIONER

## 2018-05-29 PROCEDURE — 99213 OFFICE O/P EST LOW 20 MIN: CPT | Performed by: NURSE PRACTITIONER

## 2018-05-29 PROCEDURE — 80305 DRUG TEST PRSMV DIR OPT OBS: CPT | Performed by: NURSE PRACTITIONER

## 2018-05-29 PROCEDURE — G8427 DOCREV CUR MEDS BY ELIG CLIN: HCPCS | Performed by: NURSE PRACTITIONER

## 2018-05-29 ASSESSMENT — ENCOUNTER SYMPTOMS: BACK PAIN: 1

## 2018-05-31 ENCOUNTER — OFFICE VISIT (OUTPATIENT)
Dept: NEUROLOGY | Age: 28
End: 2018-05-31
Payer: MEDICARE

## 2018-05-31 ENCOUNTER — HOSPITAL ENCOUNTER (OUTPATIENT)
Dept: PHYSICAL THERAPY | Facility: CLINIC | Age: 28
Setting detail: THERAPIES SERIES
Discharge: HOME | End: 2018-05-31
Payer: MEDICARE

## 2018-05-31 VITALS
BODY MASS INDEX: 26.56 KG/M2 | DIASTOLIC BLOOD PRESSURE: 86 MMHG | WEIGHT: 207 LBS | SYSTOLIC BLOOD PRESSURE: 122 MMHG | HEIGHT: 74 IN | HEART RATE: 99 BPM

## 2018-05-31 DIAGNOSIS — I67.1 ANEURYSM OF LEFT INTERNAL CAROTID ARTERY: Primary | ICD-10-CM

## 2018-05-31 PROCEDURE — 4004F PT TOBACCO SCREEN RCVD TLK: CPT | Performed by: PSYCHIATRY & NEUROLOGY

## 2018-05-31 PROCEDURE — 99204 OFFICE O/P NEW MOD 45 MIN: CPT | Performed by: PSYCHIATRY & NEUROLOGY

## 2018-05-31 PROCEDURE — 97110 THERAPEUTIC EXERCISES: CPT

## 2018-05-31 PROCEDURE — G8427 DOCREV CUR MEDS BY ELIG CLIN: HCPCS | Performed by: PSYCHIATRY & NEUROLOGY

## 2018-05-31 PROCEDURE — G8417 CALC BMI ABV UP PARAM F/U: HCPCS | Performed by: PSYCHIATRY & NEUROLOGY

## 2018-05-31 PROCEDURE — 97161 PT EVAL LOW COMPLEX 20 MIN: CPT

## 2018-05-31 ASSESSMENT — ENCOUNTER SYMPTOMS
VOMITING: 0
COUGH: 0
NAUSEA: 0
SHORTNESS OF BREATH: 0
VOICE CHANGE: 0
COLOR CHANGE: 0
PHOTOPHOBIA: 0

## 2018-06-01 ENCOUNTER — TELEPHONE (OUTPATIENT)
Dept: NEUROLOGY | Age: 28
End: 2018-06-01

## 2018-06-06 ENCOUNTER — HOSPITAL ENCOUNTER (OUTPATIENT)
Dept: PHYSICAL THERAPY | Facility: CLINIC | Age: 28
Setting detail: THERAPIES SERIES
Discharge: HOME | End: 2018-06-06
Payer: MEDICARE

## 2018-06-06 PROCEDURE — 97110 THERAPEUTIC EXERCISES: CPT

## 2018-06-12 ENCOUNTER — HOSPITAL ENCOUNTER (OUTPATIENT)
Dept: PHYSICAL THERAPY | Facility: CLINIC | Age: 28
Setting detail: THERAPIES SERIES
Discharge: HOME | End: 2018-06-12
Payer: MEDICARE

## 2018-06-12 PROCEDURE — 97110 THERAPEUTIC EXERCISES: CPT

## 2018-06-15 ENCOUNTER — HOSPITAL ENCOUNTER (OUTPATIENT)
Dept: PHYSICAL THERAPY | Facility: CLINIC | Age: 28
Setting detail: THERAPIES SERIES
Discharge: HOME | End: 2018-06-15
Payer: MEDICARE

## 2018-06-15 PROCEDURE — 97110 THERAPEUTIC EXERCISES: CPT

## 2018-06-27 ENCOUNTER — NURSE ONLY (OUTPATIENT)
Dept: FAMILY MEDICINE CLINIC | Age: 28
End: 2018-06-27
Payer: MEDICARE

## 2018-06-27 DIAGNOSIS — F11.11 OPIOID ABUSE, IN REMISSION (HCC): Primary | ICD-10-CM

## 2018-06-27 DIAGNOSIS — M25.512 LEFT SHOULDER PAIN, UNSPECIFIED CHRONICITY: ICD-10-CM

## 2018-06-27 LAB
AMPHETAMINE SCREEN, URINE: NEGATIVE
BARBITURATE SCREEN, URINE: NEGATIVE
BENZODIAZEPINE SCREEN, URINE: NEGATIVE
COCAINE METABOLITE SCREEN URINE: NEGATIVE
MDMA URINE: NEGATIVE
METHADONE SCREEN, URINE: NEGATIVE
METHAMPHETAMINE, URINE: NEGATIVE
OPIATE SCREEN URINE: NEGATIVE
OXYCODONE SCREEN URINE: NEGATIVE
PHENCYCLIDINE SCREEN URINE: NEGATIVE
PROPOXYPHENE SCREEN, URINE: NEGATIVE
THC: POSITIVE
TRICYCLIC ANTIDEPRESSANTS, UR: NEGATIVE

## 2018-06-27 PROCEDURE — 80305 DRUG TEST PRSMV DIR OPT OBS: CPT | Performed by: NURSE PRACTITIONER

## 2018-06-27 PROCEDURE — 96372 THER/PROPH/DIAG INJ SC/IM: CPT | Performed by: NURSE PRACTITIONER

## 2018-07-02 DIAGNOSIS — F11.11 OPIOID ABUSE, IN REMISSION (HCC): Primary | ICD-10-CM

## 2018-07-02 RX ORDER — NALTREXONE 380 MG
380 KIT INTRAMUSCULAR ONCE
Qty: 1.2 EACH | Refills: 5 | Status: SHIPPED | OUTPATIENT
Start: 2018-07-02 | End: 2018-07-02

## 2018-07-16 ENCOUNTER — HOSPITAL ENCOUNTER (OUTPATIENT)
Dept: PHYSICAL THERAPY | Facility: CLINIC | Age: 28
Setting detail: THERAPIES SERIES
Discharge: HOME | End: 2018-07-16
Payer: MEDICARE

## 2018-07-16 NOTE — FLOWSHEET NOTE
[] Tim Abreu        Outpatient Physical                Therapy       [address]       Phone: [phone]       Fax: [phone] [] [facility] at [address]       Phone: [phone]       Fax: [phone] [x] [place].  [address] [facility]     [address] [facility]      Phone: [phone]      Fax:  [phone] [facility]    [address] [facility] [address]  Phone [phone]  Fax  [phone]     Physical Therapy Cancel/No Show note    Date: 2018  Patient: Nick Luque  : 1990  MRN: [id number]    Cancels/No Shows to date:   For today's appointment patient:  []  Cancelled  []  Rescheduled appointment  []  No-show     Reason given by patient:  []  Patient ill  []  Conflicting appointment  []  No transportation    [x]  Conflict with work  []  No reason given  []  Weather related  []  Other:      Comments:      [x]  Next appointment was confirmed    Electronically signed by: Aimee López

## 2018-07-17 ENCOUNTER — HOSPITAL ENCOUNTER (OUTPATIENT)
Dept: PHYSICAL THERAPY | Facility: CLINIC | Age: 28
Setting detail: THERAPIES SERIES
Discharge: HOME | End: 2018-07-17
Payer: MEDICARE

## 2018-07-17 PROCEDURE — 97110 THERAPEUTIC EXERCISES: CPT

## 2018-07-17 NOTE — FLOWSHEET NOTE
needed. Avoid mobilization     Treatment Charges: Mins Units   []  Modalities     [x]  Ther Exercise 30 2   []  Manual Therapy     []  Ther Activities     []  Aquatics     []  Vasocompression     []  Other     Total Treatment time 30 2       Assessment: [x] Progressing toward goals. [] No change. [x] Other: Initiated stretches and exercises per log with good katherine. Pt is returning to therapy after a week off, with no progressions this date due to return to therapy. Pt needing VC's for proper form as well as exercise recall. No increase in pain post treatment. Pt noting he has script for shoulder and will call tomorrow to schedule eval.     STG: (to be met in 10 treatments)  1. ? Pain: Pt to decrease pain levels to 4/10 with ADLs  2. ? ROM: Increase flexibility throughout to ease ADL progression  3. ? Strength: Increase UE strength to 5/5 MMT throughout   4. Independent with Home Exercise Programs    LTG: (to be met in 20 treatments)  1. Improve score of functional assessment tool NDI from 48% impairment to less than 20% impairment   2. Decrease pain to 2-3/10 with ADLs     Patient goals: Decrease pain    Pt. Education:  [x] Yes  [] No  [] Reviewed Prior HEP/Ed  Method of Education: [x] Verbal  [x] Demo  [] Written  Comprehension of Education:  [x] Verbalizes understanding. [x] Demonstrates understanding. [] Needs review. [] Demonstrates/verbalizes HEP/Ed previously given. Plan: [x] Continue per plan of care.    [] Other:      Time In: 5:05pm            Time Out: 5:35    Electronically signed by:  Bill Urbano PTA

## 2018-07-26 ENCOUNTER — OFFICE VISIT (OUTPATIENT)
Dept: FAMILY MEDICINE CLINIC | Age: 28
End: 2018-07-26
Payer: MEDICARE

## 2018-07-26 VITALS
DIASTOLIC BLOOD PRESSURE: 80 MMHG | WEIGHT: 210.6 LBS | BODY MASS INDEX: 27.04 KG/M2 | SYSTOLIC BLOOD PRESSURE: 120 MMHG | OXYGEN SATURATION: 95 % | TEMPERATURE: 97.3 F | HEART RATE: 91 BPM

## 2018-07-26 DIAGNOSIS — F11.11 OPIOID ABUSE, IN REMISSION (HCC): Primary | ICD-10-CM

## 2018-07-26 LAB
AMPHETAMINE SCREEN, URINE: NEGATIVE
BARBITURATE SCREEN, URINE: NEGATIVE
BENZODIAZEPINE SCREEN, URINE: NEGATIVE
BUPRENORPHINE URINE: ABNORMAL
COCAINE METABOLITE SCREEN URINE: NEGATIVE
GABAPENTIN SCREEN, URINE: ABNORMAL
METHADONE SCREEN, URINE: NEGATIVE
METHAMPHETAMINE, URINE: NEGATIVE
OPIATE SCREEN URINE: ABNORMAL
OXYCODONE SCREEN URINE: NEGATIVE
PHENCYCLIDINE SCREEN URINE: NEGATIVE
PROPOXYPHENE SCREEN, URINE: ABNORMAL
THC SCREEN, URINE: POSITIVE
TRICYCLIC ANTIDEPRESSANTS, UR: NEGATIVE

## 2018-07-26 PROCEDURE — 96372 THER/PROPH/DIAG INJ SC/IM: CPT | Performed by: NURSE PRACTITIONER

## 2018-07-26 PROCEDURE — 80305 DRUG TEST PRSMV DIR OPT OBS: CPT | Performed by: NURSE PRACTITIONER

## 2018-07-26 PROCEDURE — G8417 CALC BMI ABV UP PARAM F/U: HCPCS | Performed by: NURSE PRACTITIONER

## 2018-07-26 PROCEDURE — 4004F PT TOBACCO SCREEN RCVD TLK: CPT | Performed by: NURSE PRACTITIONER

## 2018-07-26 PROCEDURE — G8427 DOCREV CUR MEDS BY ELIG CLIN: HCPCS | Performed by: NURSE PRACTITIONER

## 2018-07-26 PROCEDURE — 99213 OFFICE O/P EST LOW 20 MIN: CPT | Performed by: NURSE PRACTITIONER

## 2018-07-26 ASSESSMENT — ENCOUNTER SYMPTOMS: BACK PAIN: 1

## 2018-07-26 ASSESSMENT — PATIENT HEALTH QUESTIONNAIRE - PHQ9
1. LITTLE INTEREST OR PLEASURE IN DOING THINGS: 0
SUM OF ALL RESPONSES TO PHQ9 QUESTIONS 1 & 2: 0
SUM OF ALL RESPONSES TO PHQ QUESTIONS 1-9: 0
2. FEELING DOWN, DEPRESSED OR HOPELESS: 0
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
## 2018-08-06 ENCOUNTER — HOSPITAL ENCOUNTER (OUTPATIENT)
Dept: PHYSICAL THERAPY | Facility: CLINIC | Age: 28
Setting detail: THERAPIES SERIES
Discharge: HOME | End: 2018-08-06
Payer: MEDICARE

## 2018-08-06 PROCEDURE — 97161 PT EVAL LOW COMPLEX 20 MIN: CPT

## 2018-08-06 PROCEDURE — 97110 THERAPEUTIC EXERCISES: CPT

## 2018-08-09 ENCOUNTER — HOSPITAL ENCOUNTER (OUTPATIENT)
Dept: PHYSICAL THERAPY | Facility: CLINIC | Age: 28
Setting detail: THERAPIES SERIES
Discharge: HOME | End: 2018-08-09
Payer: MEDICARE

## 2018-08-09 PROCEDURE — 97110 THERAPEUTIC EXERCISES: CPT

## 2018-08-09 NOTE — FLOWSHEET NOTE
[] [facility]       Outpatient Physical        Therapy       [address]       Phone: [phone]       Fax: [phone] [] [facility] at [address]       Phone: [phone]       Fax: [phone] [x] [place]. [facility]   Phone: [phone]   Fax:  [phone]     Physical Therapy Daily Treatment Note    Date:  2018  Patient: Guy Phillip                   : 1990                      MRN: [id number]  Physician: TENZIN Pulido NP                                 Insurance: [employer]  Medical Diagnosis: LUE Shoulder Pain                   Rehab Codes: M25.512  Onset Date:                    Next 's appt. :   Visit# / total visits:   Cancels/No Shows:     Subjective:  Pt arrived noting moderated L shoulder pain, stating pain is in posterior aspect of shoulder as well as the joint itself. Pain:  [x] Yes  [] No Location: L shoulder Pain Rating: (0-10 scale) 4-5/10  Pain altered Tx:  [] No  [x] Yes  Action: rest  Comments:    Todays Treatment:     Exercises:  Exercise Reps/ Time Weight/ Level Comments   UBE  6'       pec stretch 3x30\"  pec minor and major              *Wand flex  15x5\"       *Wand Abd  15x5\"       *Wall walks  15x5\"       *Table slides                    TBand Rows  2x10  green     TBand Ext  2x10  green     TBand HAB  2x10  red     TBand ER/IR  2x10 yellow                          Other:        Specific Instructions for next treatment:      Treatment Charges: Mins Units   []  Modalities     [x]  Ther Exercise 40 3   []  Manual Therapy     []  Ther Activities     []  Aquatics     []  Vasocompression     []  Other     Total Treatment time 40 3       Assessment: [x] Progressing toward goals. Initiated stretches and exercise per log with fair katherine. Pt displaying difficulty with Tband ER/IR this date as well as HAB.  VC's for proper form

## 2018-08-13 ENCOUNTER — HOSPITAL ENCOUNTER (OUTPATIENT)
Dept: PHYSICAL THERAPY | Facility: CLINIC | Age: 28
Setting detail: THERAPIES SERIES
Discharge: HOME | End: 2018-08-13
Payer: MEDICARE

## 2018-08-13 PROCEDURE — 97110 THERAPEUTIC EXERCISES: CPT

## 2018-08-13 NOTE — FLOWSHEET NOTE
[] [facility]       Outpatient Physical        Therapy       [address]       Phone: [phone]       Fax: [phone] [] [facility] at [address] [facility]       Phone: [phone]       Fax: [phone] [x] [place]. [address] [facility]  [address] [facility] [address]   Phone: [phone]   Fax:  [phone]     Physical Therapy Daily Treatment Note    Date:  2018  Patient: Nini Guajardo                   : 1990                      MRN: [id number]  Physician: TENZIN Hernandez NP                                 Insurance: [employer]  Medical Diagnosis: LUE Shoulder Pain                   Rehab Codes: M25.512  Onset Date:                    Next 's appt. :   Visit# / total visits: 3/20  Cancels/No Shows:     Subjective:  Pt arrived still having moderate pain in L shoulder, stating he is going to see his Dr and requesting and MRI. Pt unsure when Dr appointment was at this time.    Pain:  [x] Yes  [] No Location: L shoulder Pain Rating: (0-10 scale) 4-5/10  Pain altered Tx:  [] No  [x] Yes  Action: rest  Comments:    Todays Treatment:     Exercises:  Exercise Reps/ Time Weight/ Level Comments   UBE  6'       pec stretch 3x30\"  pec minor and major              *Wand flex  15x5\"       *Wand Abd  15x5\"       *Wall walks  15x5\"       *Table slides   15x5\"       Supine punches  20x 1#    abc's 2x 1#    SL flexion  1#    SL abduction  1#    SL ER  1#              TBand Rows  2x10  green     TBand Ext  2x10  green     TBand HAB  2x10  red     TBand IR  2x10 yellow      TBand ER 5x5  yellow   due to fatigue              Other:        Specific Instructions for next treatment:      Treatment Charges: Mins Units   []  Modalities     [x]  Ther Exercise 40 3   []  Manual Therapy     []  Ther Activities     []  Aquatics     []  Vasocompression     []  Other     Total Treatment time 40 3       Assessment: [x] Progressing toward goals. Progressed pt with adding supine punches and abc's, and SL exercises with good katherine to new exercises. Pt noting some increased pain as well as some tingling and burning sensation from L shoulder to L elbow hand region during Tband ER/IR this date. Pt stating he feels he tore something in shoulder and wants to get an MRI just to figure this all out. Will continue to progress as katherine. [] No change. [] Other:    STG: (to be met in 10 treatments)  1. ? Pain: Pt to decrease pain levels to 2/10  2. ? ROM: Increase flexibility throughout to ease ADL progression  3. ? Strength: Increase mobility and strength to 5/5 MMT  4. Independent with Home Exercise Programs     LTG: (to be met in 20 treatments)  1. Improve score of functional assessment tool QUICK DASH to less than 15% impairment   2. Decrease pain to 0/10     Patient goals: decrease shoulder pain     Pt. Education:  [x] Yes  [] No  [x] Reviewed Prior HEP/Ed Issued HEP and bands. Method of Education: [x] Verbal  [] Demo  [x] Written  Comprehension of Education:  [x] Verbalizes understanding. [] Demonstrates understanding. [] Needs review. [] Demonstrates/verbalizes HEP/Ed previously given. Plan: [] Continue per plan of care.    [] Other:      Time In: 2:50           Time Out: 3:36    Electronically signed by:  Brooke Calderon PTA

## 2018-08-16 ENCOUNTER — HOSPITAL ENCOUNTER (OUTPATIENT)
Dept: PHYSICAL THERAPY | Facility: CLINIC | Age: 28
Setting detail: THERAPIES SERIES
Discharge: HOME | End: 2018-08-16
Payer: MEDICARE

## 2018-08-20 ENCOUNTER — HOSPITAL ENCOUNTER (OUTPATIENT)
Dept: PHYSICAL THERAPY | Facility: CLINIC | Age: 28
Setting detail: THERAPIES SERIES
Discharge: HOME | End: 2018-08-20
Payer: MEDICARE

## 2018-08-20 NOTE — FLOWSHEET NOTE
[] [facility]        Outpatient Physical                Therapy       [address]       Phone: [phone]       Fax: [phone] [] [facility]       Promotion at [address] [facility]       Phone: [phone]       Fax: [phone] [x] [facility]. [place]      for Health Promotion     [address] [facility]      Phone: [phone]      Fax:  [phone] [facility]    [address] [facility],   [facility] [address]  Phone [phone]  Fax  [phone]     Physical Therapy Cancel/No Show note    Date: 2018  Patient: Guy Phillip  : 1990  MRN: [id number]    Cancels/No Shows to date:   For today's appointment patient:  [x]  Cancelled  []  Rescheduled appointment  [x]  No-show     Reason given by patient:  []  Patient ill  []  Conflicting appointment  []  No transportation    []  Conflict with work  []  No reason given  []  Weather related  []  Other:      Comments:       []  Next appointment was confirmed: tried to leave message but unable due to improper number given.      Electronically signed by: Eura Hodgkin, PTA

## 2018-08-23 ENCOUNTER — NURSE ONLY (OUTPATIENT)
Dept: FAMILY MEDICINE CLINIC | Age: 28
End: 2018-08-23
Payer: MEDICARE

## 2018-08-23 ENCOUNTER — APPOINTMENT (OUTPATIENT)
Dept: PHYSICAL THERAPY | Facility: CLINIC | Age: 28
End: 2018-08-23
Payer: MEDICARE

## 2018-08-23 DIAGNOSIS — G89.29 CHRONIC PAIN OF RIGHT KNEE: ICD-10-CM

## 2018-08-23 DIAGNOSIS — M25.561 CHRONIC PAIN OF RIGHT KNEE: ICD-10-CM

## 2018-08-23 DIAGNOSIS — G89.29 CHRONIC LEFT SHOULDER PAIN: Primary | ICD-10-CM

## 2018-08-23 DIAGNOSIS — M25.512 CHRONIC LEFT SHOULDER PAIN: Primary | ICD-10-CM

## 2018-08-23 DIAGNOSIS — F11.11 OPIOID ABUSE, IN REMISSION (HCC): ICD-10-CM

## 2018-08-23 LAB
AMPHETAMINE SCREEN, URINE: NEGATIVE
BARBITURATE SCREEN, URINE: NEGATIVE
BENZODIAZEPINE SCREEN, URINE: NEGATIVE
BUPRENORPHINE URINE: NEGATIVE
COCAINE METABOLITE SCREEN URINE: NEGATIVE
GABAPENTIN SCREEN, URINE: NEGATIVE
METHADONE SCREEN, URINE: NEGATIVE
METHAMPHETAMINE, URINE: NEGATIVE
OPIATE SCREEN URINE: NEGATIVE
OXYCODONE SCREEN URINE: NEGATIVE
PHENCYCLIDINE SCREEN URINE: NEGATIVE
PROPOXYPHENE SCREEN, URINE: NEGATIVE
THC SCREEN, URINE: POSITIVE
TRICYCLIC ANTIDEPRESSANTS, UR: NEGATIVE

## 2018-08-23 PROCEDURE — 80305 DRUG TEST PRSMV DIR OPT OBS: CPT | Performed by: NURSE PRACTITIONER

## 2018-08-23 PROCEDURE — 96372 THER/PROPH/DIAG INJ SC/IM: CPT | Performed by: NURSE PRACTITIONER

## 2018-08-31 ENCOUNTER — HOSPITAL ENCOUNTER (OUTPATIENT)
Dept: MRI IMAGING | Facility: CLINIC | Age: 28
Discharge: HOME | End: 2018-09-02
Payer: MEDICARE

## 2018-08-31 DIAGNOSIS — M25.512 CHRONIC LEFT SHOULDER PAIN: ICD-10-CM

## 2018-08-31 DIAGNOSIS — G89.29 CHRONIC LEFT SHOULDER PAIN: ICD-10-CM

## 2018-08-31 PROCEDURE — 73221 MRI JOINT UPR EXTREM W/O DYE: CPT

## 2018-09-04 DIAGNOSIS — M25.512 CHRONIC LEFT SHOULDER PAIN: Primary | ICD-10-CM

## 2018-09-04 DIAGNOSIS — R93.6 ABNORMAL MAGNETIC RESONANCE IMAGING OF SHOULDER: ICD-10-CM

## 2018-09-04 DIAGNOSIS — G89.29 CHRONIC LEFT SHOULDER PAIN: Primary | ICD-10-CM

## 2018-09-11 DIAGNOSIS — M25.512 LEFT SHOULDER PAIN, UNSPECIFIED CHRONICITY: Primary | ICD-10-CM

## 2018-09-26 ENCOUNTER — OFFICE VISIT (OUTPATIENT)
Dept: FAMILY MEDICINE CLINIC | Age: 28
End: 2018-09-26
Payer: MEDICARE

## 2018-09-26 VITALS
BODY MASS INDEX: 27.19 KG/M2 | HEART RATE: 84 BPM | OXYGEN SATURATION: 95 % | WEIGHT: 211.8 LBS | TEMPERATURE: 97.2 F | DIASTOLIC BLOOD PRESSURE: 88 MMHG | SYSTOLIC BLOOD PRESSURE: 130 MMHG

## 2018-09-26 DIAGNOSIS — F11.11 OPIOID ABUSE, IN REMISSION (HCC): Primary | ICD-10-CM

## 2018-09-26 DIAGNOSIS — Z79.899 HIGH RISK MEDICATION USE: ICD-10-CM

## 2018-09-26 DIAGNOSIS — Z02.71 DISABILITY EXAMINATION: ICD-10-CM

## 2018-09-26 LAB
AMPHETAMINE SCREEN, URINE: NEGATIVE
BARBITURATE SCREEN, URINE: NEGATIVE
BENZODIAZEPINE SCREEN, URINE: NEGATIVE
BUPRENORPHINE URINE: NORMAL
COCAINE METABOLITE SCREEN URINE: NEGATIVE
GABAPENTIN SCREEN, URINE: NORMAL
METHADONE SCREEN, URINE: NEGATIVE
METHAMPHETAMINE, URINE: NEGATIVE
OPIATE SCREEN URINE: NORMAL
OXYCODONE SCREEN URINE: NEGATIVE
PHENCYCLIDINE SCREEN URINE: NEGATIVE
PROPOXYPHENE SCREEN, URINE: NORMAL
THC SCREEN, URINE: POSITIVE
TRICYCLIC ANTIDEPRESSANTS, UR: NEGATIVE

## 2018-09-26 PROCEDURE — 99213 OFFICE O/P EST LOW 20 MIN: CPT | Performed by: NURSE PRACTITIONER

## 2018-09-26 PROCEDURE — 4004F PT TOBACCO SCREEN RCVD TLK: CPT | Performed by: NURSE PRACTITIONER

## 2018-09-26 PROCEDURE — G8427 DOCREV CUR MEDS BY ELIG CLIN: HCPCS | Performed by: NURSE PRACTITIONER

## 2018-09-26 PROCEDURE — G8417 CALC BMI ABV UP PARAM F/U: HCPCS | Performed by: NURSE PRACTITIONER

## 2018-09-26 PROCEDURE — 80305 DRUG TEST PRSMV DIR OPT OBS: CPT | Performed by: NURSE PRACTITIONER

## 2018-09-26 NOTE — PROGRESS NOTES
Patient is present for vivitrol injection    Patient has paperwork for disability  Patient will need a [place] and medication reviewed with patient    Visit Information    Have you changed or started any medications since your last visit including any over-the-counter medicines, vitamins, or herbal medicines? no   Have you stopped taking any of your medications? Is so, why? -  no  Are you having any side effects from any of your medications? - no    Have you seen any other physician or provider since your last visit? yes - psych   Have you had any other diagnostic tests since your last visit?  no   Have you been seen in the emergency room and/or had an admission in a hospital since we last saw you?  no   Have you had your routine dental cleaning in the past 6 months?  no     Do you have an active MyChart account? If no, what is the barrier?   Yes    Patient Care Team:  PHOEBE Gayle CNP as PCP - General (Certified Nurse Practitioner)  PHOEBE Gayle CNP as PCP - S Attributed Provider    Medical History Review  Past Medical, Family, and Social History reviewed and does contribute to the patient presenting condition    Health Maintenance   Topic Date Due    Flu vaccine (1) 09/01/2018    DTaP/Tdap/Td vaccine (1 - Tdap) 07/26/2019 (Originally 3/11/2008)    Pneumococcal med risk (1 of 1 - PPSV23) 07/26/2019 (Originally 3/11/2008)    HIV screen  07/26/2019 (Originally 3/11/2004)
exercise. 1.  Dania received counseling on the following healthy behaviors: nutrition, exercise and medication adherence  2. Patient given educational materials when available - see patient instructions when applicable  3. Discussed use, benefit, and side effects of prescribed medications. Barriers to medication compliance addressed. All patient questions answered. Pt voiced understanding. 4.  Reviewed prior labs and health maintenance  5. Continue current medications, diet and exercise.     Completed Refills   Requested Prescriptions      No prescriptions requested or ordered in this encounter         Electronically signed by Maria Esther Dominguez CNP on 9/26/2018 at 10:09 AM

## 2018-11-01 ENCOUNTER — OFFICE VISIT (OUTPATIENT)
Dept: FAMILY MEDICINE CLINIC | Age: 28
End: 2018-11-01
Payer: MEDICARE

## 2018-11-01 VITALS
HEART RATE: 85 BPM | DIASTOLIC BLOOD PRESSURE: 80 MMHG | TEMPERATURE: 97.6 F | WEIGHT: 213.2 LBS | SYSTOLIC BLOOD PRESSURE: 118 MMHG | OXYGEN SATURATION: 95 % | BODY MASS INDEX: 27.37 KG/M2

## 2018-11-01 DIAGNOSIS — F11.11 OPIOID ABUSE, IN REMISSION (HCC): Primary | ICD-10-CM

## 2018-11-01 DIAGNOSIS — Z79.899 HIGH RISK MEDICATION USE: ICD-10-CM

## 2018-11-01 LAB
AMPHETAMINE SCREEN, URINE: NORMAL
BARBITURATE SCREEN, URINE: NORMAL
BENZODIAZEPINE SCREEN, URINE: NORMAL
BUPRENORPHINE URINE: NORMAL
COCAINE METABOLITE SCREEN URINE: NORMAL
GABAPENTIN SCREEN, URINE: NORMAL
METHADONE SCREEN, URINE: NORMAL
METHAMPHETAMINE, URINE: NORMAL
OPIATE SCREEN URINE: NORMAL
OXYCODONE SCREEN URINE: NORMAL
PHENCYCLIDINE SCREEN URINE: NORMAL
PROPOXYPHENE SCREEN, URINE: NORMAL
THC SCREEN, URINE: POSITIVE
TRICYCLIC ANTIDEPRESSANTS, UR: NORMAL

## 2018-11-01 PROCEDURE — 4004F PT TOBACCO SCREEN RCVD TLK: CPT | Performed by: NURSE PRACTITIONER

## 2018-11-01 PROCEDURE — G8484 FLU IMMUNIZE NO ADMIN: HCPCS | Performed by: NURSE PRACTITIONER

## 2018-11-01 PROCEDURE — 80305 DRUG TEST PRSMV DIR OPT OBS: CPT | Performed by: NURSE PRACTITIONER

## 2018-11-01 PROCEDURE — G8427 DOCREV CUR MEDS BY ELIG CLIN: HCPCS | Performed by: NURSE PRACTITIONER

## 2018-11-01 PROCEDURE — G8417 CALC BMI ABV UP PARAM F/U: HCPCS | Performed by: NURSE PRACTITIONER

## 2018-11-01 PROCEDURE — 99213 OFFICE O/P EST LOW 20 MIN: CPT | Performed by: NURSE PRACTITIONER

## 2018-11-12 ENCOUNTER — OFFICE VISIT (OUTPATIENT)
Dept: ORTHOPEDIC SURGERY | Age: 28
End: 2018-11-12
Payer: MEDICARE

## 2018-11-12 VITALS — HEIGHT: 73 IN | BODY MASS INDEX: 27.67 KG/M2 | WEIGHT: 208.8 LBS

## 2018-11-12 DIAGNOSIS — M25.512 LEFT SHOULDER PAIN, UNSPECIFIED CHRONICITY: ICD-10-CM

## 2018-11-12 DIAGNOSIS — S24.8XXA TRAUMATIC LONG THORACIC NERVE PALSY, INITIAL ENCOUNTER: Primary | ICD-10-CM

## 2018-11-12 PROCEDURE — G8417 CALC BMI ABV UP PARAM F/U: HCPCS | Performed by: ORTHOPAEDIC SURGERY

## 2018-11-12 PROCEDURE — G8484 FLU IMMUNIZE NO ADMIN: HCPCS | Performed by: ORTHOPAEDIC SURGERY

## 2018-11-12 PROCEDURE — 99203 OFFICE O/P NEW LOW 30 MIN: CPT | Performed by: ORTHOPAEDIC SURGERY

## 2018-11-12 PROCEDURE — 4004F PT TOBACCO SCREEN RCVD TLK: CPT | Performed by: ORTHOPAEDIC SURGERY

## 2018-11-12 PROCEDURE — G8427 DOCREV CUR MEDS BY ELIG CLIN: HCPCS | Performed by: ORTHOPAEDIC SURGERY

## 2018-11-12 ASSESSMENT — ENCOUNTER SYMPTOMS
DIARRHEA: 0
COUGH: 0
NAUSEA: 0
CONSTIPATION: 0
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
## 2018-11-13 ENCOUNTER — HOSPITAL ENCOUNTER (OUTPATIENT)
Dept: PHYSICAL THERAPY | Facility: CLINIC | Age: 28
Setting detail: THERAPIES SERIES
Discharge: HOME | End: 2018-11-13
Payer: MEDICARE

## 2018-11-13 PROCEDURE — 97750 PHYSICAL PERFORMANCE TEST: CPT

## 2018-11-29 ENCOUNTER — NURSE ONLY (OUTPATIENT)
Dept: FAMILY MEDICINE CLINIC | Age: 28
End: 2018-11-29
Payer: MEDICARE

## 2018-11-29 DIAGNOSIS — Z79.899 HIGH RISK MEDICATION USE: Primary | ICD-10-CM

## 2018-11-29 PROCEDURE — 96372 THER/PROPH/DIAG INJ SC/IM: CPT | Performed by: NURSE PRACTITIONER

## 2018-11-29 PROCEDURE — 80305 DRUG TEST PRSMV DIR OPT OBS: CPT | Performed by: NURSE PRACTITIONER

## 2018-12-17 ENCOUNTER — OFFICE VISIT (OUTPATIENT)
Dept: ORTHOPEDIC SURGERY | Age: 28
End: 2018-12-17
Payer: MEDICARE

## 2018-12-17 VITALS — HEIGHT: 73 IN | BODY MASS INDEX: 27.67 KG/M2 | WEIGHT: 208.78 LBS

## 2018-12-17 DIAGNOSIS — G89.29 CHRONIC PAIN OF RIGHT KNEE: Primary | ICD-10-CM

## 2018-12-17 DIAGNOSIS — S24.8XXD TRAUMATIC LONG THORACIC NERVE PALSY, SUBSEQUENT ENCOUNTER: ICD-10-CM

## 2018-12-17 DIAGNOSIS — M25.561 CHRONIC PAIN OF RIGHT KNEE: Primary | ICD-10-CM
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
## 2018-12-17 DIAGNOSIS — S16.1XXD REPETITIVE STRAIN INJURY OF CERVICAL SPINE, SUBSEQUENT ENCOUNTER: ICD-10-CM

## 2018-12-17 DIAGNOSIS — M53.2X2 CERVICAL SPINE INSTABILITY: ICD-10-CM

## 2018-12-17 PROCEDURE — 4004F PT TOBACCO SCREEN RCVD TLK: CPT | Performed by: ORTHOPAEDIC SURGERY

## 2018-12-17 PROCEDURE — G8417 CALC BMI ABV UP PARAM F/U: HCPCS | Performed by: ORTHOPAEDIC SURGERY

## 2018-12-17 PROCEDURE — G8484 FLU IMMUNIZE NO ADMIN: HCPCS | Performed by: ORTHOPAEDIC SURGERY

## 2018-12-17 PROCEDURE — 99213 OFFICE O/P EST LOW 20 MIN: CPT | Performed by: ORTHOPAEDIC SURGERY

## 2018-12-17 PROCEDURE — G8427 DOCREV CUR MEDS BY ELIG CLIN: HCPCS | Performed by: ORTHOPAEDIC SURGERY

## 2018-12-17 RX ORDER — ACETAMINOPHEN 325 MG/1
650 TABLET ORAL EVERY 6 HOURS PRN
COMMUNITY
End: 2019-02-25 | Stop reason: ALTCHOICE

## 2018-12-17 ASSESSMENT — ENCOUNTER SYMPTOMS
WHEEZING: 0
BACK PAIN: 0
SHORTNESS OF BREATH: 0
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
## 2018-12-31 ENCOUNTER — OFFICE VISIT (OUTPATIENT)
Dept: FAMILY MEDICINE CLINIC | Age: 28
End: 2018-12-31
Payer: MEDICARE

## 2018-12-31 VITALS
TEMPERATURE: 97.7 F | SYSTOLIC BLOOD PRESSURE: 110 MMHG | DIASTOLIC BLOOD PRESSURE: 70 MMHG | HEART RATE: 78 BPM | WEIGHT: 210.8 LBS | OXYGEN SATURATION: 96 % | BODY MASS INDEX: 27.67 KG/M2

## 2018-12-31 DIAGNOSIS — Z79.899 HIGH RISK MEDICATION USE: ICD-10-CM

## 2018-12-31 DIAGNOSIS — F11.11 OPIOID ABUSE, IN REMISSION (HCC): Primary | ICD-10-CM

## 2018-12-31 LAB
AMPHETAMINE SCREEN, URINE: NORMAL
BARBITURATE SCREEN, URINE: NORMAL
BENZODIAZEPINE SCREEN, URINE: NORMAL
BUPRENORPHINE URINE: NORMAL
COCAINE METABOLITE SCREEN URINE: NORMAL
GABAPENTIN SCREEN, URINE: NORMAL
MDMA URINE: NORMAL
METHADONE SCREEN, URINE: NORMAL
METHAMPHETAMINE, URINE: NORMAL
OPIATE SCREEN URINE: NORMAL
OXYCODONE SCREEN URINE: NORMAL
PHENCYCLIDINE SCREEN URINE: NORMAL
PROPOXYPHENE SCREEN, URINE: NORMAL
THC SCREEN, URINE: POSITIVE
TRICYCLIC ANTIDEPRESSANTS, UR: NORMAL

## 2018-12-31 PROCEDURE — 80305 DRUG TEST PRSMV DIR OPT OBS: CPT | Performed by: NURSE PRACTITIONER

## 2018-12-31 PROCEDURE — G8417 CALC BMI ABV UP PARAM F/U: HCPCS | Performed by: NURSE PRACTITIONER

## 2018-12-31 PROCEDURE — 99213 OFFICE O/P EST LOW 20 MIN: CPT | Performed by: NURSE PRACTITIONER

## 2018-12-31 PROCEDURE — G8427 DOCREV CUR MEDS BY ELIG CLIN: HCPCS | Performed by: NURSE PRACTITIONER

## 2018-12-31 PROCEDURE — 4004F PT TOBACCO SCREEN RCVD TLK: CPT | Performed by: NURSE PRACTITIONER

## 2018-12-31 PROCEDURE — G8484 FLU IMMUNIZE NO ADMIN: HCPCS | Performed by: NURSE PRACTITIONER
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
## 2018-12-31 NOTE — PROGRESS NOTES
Patient is present for vivitrol injection  No concerns at this time    Pharmacy and medication reviewed with patient    Visit Information    Have you changed or started any medications since your last visit including any over-the-counter medicines, vitamins, or herbal medicines? no   Have you stopped taking any of your medications? Is so, why? -  no  Are you having any side effects from any of your medications? - no    Have you seen any other physician or provider since your last visit? yes - ortho, psych   Have you had any other diagnostic tests since your last visit? yes - xray   Have you been seen in the emergency room and/or had an admission in a hospital since we last saw you?  no   Have you had your routine dental cleaning in the past 6 months?  no     Do you have an active MyChart account? If no, what is the barrier?   Yes    Patient Care Team:  PHOEBE Smith CNP as PCP - General (Certified Nurse Practitioner)  PHOEBE Smith CNP as PCP - S Attributed Provider    Medical History Review  Past Medical, Family, and Social History reviewed and does contribute to the patient presenting condition    Health Maintenance   Topic Date Due    DTaP/Tdap/Td vaccine (1 - Tdap) 07/26/2019 (Originally 3/11/2008)    Pneumococcal med risk (1 of 1 - PPSV23) 07/26/2019 (Originally 3/11/2008)    HIV screen  07/26/2019 (Originally 3/11/2004)    Flu vaccine (1) 09/26/2019 (Originally 9/1/2018)

## 2019-01-08 ENCOUNTER — PROCEDURE VISIT (OUTPATIENT)
Dept: PHYSICAL MEDICINE AND REHAB | Age: 29
End: 2019-01-08
Payer: MEDICARE

## 2019-01-08 DIAGNOSIS — R20.0 LEFT ARM NUMBNESS: Primary | ICD-10-CM

## 2019-01-08 PROCEDURE — 95886 MUSC TEST DONE W/N TEST COMP: CPT | Performed by: PHYSICAL MEDICINE & REHABILITATION

## 2019-01-08 PROCEDURE — 95912 NRV CNDJ TEST 11-12 STUDIES: CPT | Performed by: PHYSICAL MEDICINE & REHABILITATION

## 2019-01-28 ENCOUNTER — HOSPITAL ENCOUNTER (OUTPATIENT)
Age: 29
Setting detail: SPECIMEN
Discharge: HOME | End: 2019-01-28
Payer: MEDICARE

## 2019-01-28 ENCOUNTER — NURSE ONLY (OUTPATIENT)
Dept: FAMILY MEDICINE CLINIC | Age: 29
End: 2019-01-28
Payer: MEDICARE

## 2019-01-28 DIAGNOSIS — Z79.899 HIGH RISK MEDICATION USE: Primary | ICD-10-CM

## 2019-01-28 DIAGNOSIS — Z79.899 HIGH RISK MEDICATION USE: ICD-10-CM

## 2019-01-28 LAB
AMPHETAMINE SCREEN, URINE: ABNORMAL
BARBITURATE SCREEN, URINE: ABNORMAL
BENZODIAZEPINE SCREEN, URINE: ABNORMAL
BUPRENORPHINE URINE: ABNORMAL
COCAINE METABOLITE SCREEN URINE: ABNORMAL
GABAPENTIN SCREEN, URINE: ABNORMAL
MDMA URINE: POSITIVE
METHADONE SCREEN, URINE: ABNORMAL
METHAMPHETAMINE, URINE: ABNORMAL
OPIATE SCREEN URINE: ABNORMAL
OXYCODONE SCREEN URINE: ABNORMAL
PHENCYCLIDINE SCREEN URINE: POSITIVE
PROPOXYPHENE SCREEN, URINE: ABNORMAL
THC SCREEN, URINE: POSITIVE
TRICYCLIC ANTIDEPRESSANTS, UR: POSITIVE

## 2019-01-28 PROCEDURE — 99211 OFF/OP EST MAY X REQ PHY/QHP: CPT | Performed by: NURSE PRACTITIONER

## 2019-01-28 PROCEDURE — 80305 DRUG TEST PRSMV DIR OPT OBS: CPT | Performed by: NURSE PRACTITIONER

## 2019-01-31 ENCOUNTER — OFFICE VISIT (OUTPATIENT)
Dept: ORTHOPEDIC SURGERY | Age: 29
End: 2019-01-31
Payer: MEDICARE

## 2019-01-31 VITALS — WEIGHT: 210 LBS | HEIGHT: 74 IN | BODY MASS INDEX: 26.95 KG/M2

## 2019-01-31 DIAGNOSIS — S24.8XXD TRAUMATIC LONG THORACIC NERVE PALSY, SUBSEQUENT ENCOUNTER: ICD-10-CM

## 2019-01-31 DIAGNOSIS — M53.2X2 CERVICAL SPINE INSTABILITY: Primary | ICD-10-CM
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
## 2019-01-31 DIAGNOSIS — S16.1XXD REPETITIVE STRAIN INJURY OF CERVICAL SPINE, SUBSEQUENT ENCOUNTER: ICD-10-CM

## 2019-01-31 PROCEDURE — 99213 OFFICE O/P EST LOW 20 MIN: CPT | Performed by: ORTHOPAEDIC SURGERY

## 2019-01-31 PROCEDURE — G8427 DOCREV CUR MEDS BY ELIG CLIN: HCPCS | Performed by: ORTHOPAEDIC SURGERY

## 2019-01-31 PROCEDURE — G8484 FLU IMMUNIZE NO ADMIN: HCPCS | Performed by: ORTHOPAEDIC SURGERY

## 2019-01-31 PROCEDURE — G8417 CALC BMI ABV UP PARAM F/U: HCPCS | Performed by: ORTHOPAEDIC SURGERY

## 2019-01-31 PROCEDURE — 4004F PT TOBACCO SCREEN RCVD TLK: CPT | Performed by: ORTHOPAEDIC SURGERY

## 2019-01-31 ASSESSMENT — ENCOUNTER SYMPTOMS
CONSTIPATION: 0
COUGH: 0
DIARRHEA: 0
NAUSEA: 0

## 2019-02-01 LAB
6-ACETYLMORPHINE, UR: NOT DETECTED
7-AMINOCLONAZEPAM, URINE: NOT DETECTED
ALPHA-OH-ALPRAZ, URINE: NOT DETECTED
ALPRAZOLAM, URINE: NOT DETECTED
AMPHETAMINES, URINE: NOT DETECTED
BARBITURATES, URINE: NOT DETECTED
BENZOYLECGONINE, UR: NOT DETECTED
BUPRENORPHINE URINE: NOT DETECTED
CARISOPRODOL, UR: NOT DETECTED
CLONAZEPAM, URINE: NOT DETECTED
CODEINE, URINE: NOT DETECTED
CREATININE URINE: >400 MG/DL (ref 20–400)
DIAZEPAM, URINE: NOT DETECTED
EER PAIN MGT DRUG PANEL, HIGH RES/EMIT U: ABNORMAL
ETHYL GLUCURONIDE UR: NOT DETECTED
FENTANYL URINE: NOT DETECTED
HYDROCODONE, URINE: NOT DETECTED
HYDROMORPHONE, URINE: NOT DETECTED
LORAZEPAM, URINE: NOT DETECTED
MARIJUANA METAB, UR: PRESENT
MDA, UR: NOT DETECTED
MDEA, EVE, UR: NOT DETECTED
MDMA URINE: NOT DETECTED
MEPERIDINE METAB, UR: NOT DETECTED
METHADONE, URINE: NOT DETECTED
METHAMPHETAMINE, URINE: NOT DETECTED
METHYLPHENIDATE: NOT DETECTED
MIDAZOLAM, URINE: NOT DETECTED
MORPHINE URINE: NOT DETECTED
NORBUPRENORPHINE, URINE: NOT DETECTED
NORDIAZEPAM, URINE: NOT DETECTED
NORFENTANYL, URINE: NOT DETECTED
NORHYDROCODONE, URINE: NOT DETECTED
NOROXYCODONE, URINE: NOT DETECTED
NOROXYMORPHONE, URINE: NOT DETECTED
OXAZEPAM, URINE: NOT DETECTED
OXYCODONE URINE: NOT DETECTED
OXYMORPHONE, URINE: NOT DETECTED
PAIN MGT DRUG PANEL, HI RES, UR: ABNORMAL
PCP,URINE: NOT DETECTED
PHENTERMINE, UR: NOT DETECTED
PROPOXYPHENE, URINE: NOT DETECTED
TAPENTADOL, URINE: NOT DETECTED
TAPENTADOL-O-SULFATE, URINE: NOT DETECTED
TEMAZEPAM, URINE: NOT DETECTED
TRAMADOL, URINE: NOT DETECTED
ZOLPIDEM, URINE: NOT DETECTED

## 2019-02-25 ENCOUNTER — OFFICE VISIT (OUTPATIENT)
Dept: FAMILY MEDICINE CLINIC | Age: 29
End: 2019-02-25
Payer: MEDICARE

## 2019-02-25 VITALS
TEMPERATURE: 97.9 F | HEART RATE: 86 BPM | WEIGHT: 201.6 LBS | OXYGEN SATURATION: 94 % | DIASTOLIC BLOOD PRESSURE: 80 MMHG | SYSTOLIC BLOOD PRESSURE: 118 MMHG | BODY MASS INDEX: 25.88 KG/M2

## 2019-02-25 DIAGNOSIS — Z79.899 HIGH RISK MEDICATION USE: ICD-10-CM

## 2019-02-25 DIAGNOSIS — R06.2 WHEEZE: Primary | ICD-10-CM

## 2019-02-25 DIAGNOSIS — Z23 NEED FOR PROPHYLACTIC VACCINATION AGAINST STREPTOCOCCUS PNEUMONIAE (PNEUMOCOCCUS): ICD-10-CM

## 2019-02-25 LAB
AMPHETAMINE SCREEN, URINE: NORMAL
BARBITURATE SCREEN, URINE: NORMAL
BENZODIAZEPINE SCREEN, URINE: NORMAL
BUPRENORPHINE URINE: NORMAL
COCAINE METABOLITE SCREEN URINE: NORMAL
GABAPENTIN SCREEN, URINE: NORMAL
MDMA URINE: NORMAL
METHADONE SCREEN, URINE: NORMAL
METHAMPHETAMINE, URINE: NORMAL
OPIATE SCREEN URINE: NORMAL
OXYCODONE SCREEN URINE: NORMAL
PHENCYCLIDINE SCREEN URINE: NORMAL
PROPOXYPHENE SCREEN, URINE: NORMAL
THC SCREEN, URINE: POSITIVE
TRICYCLIC ANTIDEPRESSANTS, UR: POSITIVE

## 2019-02-25 PROCEDURE — G8417 CALC BMI ABV UP PARAM F/U: HCPCS | Performed by: NURSE PRACTITIONER

## 2019-02-25 PROCEDURE — G8427 DOCREV CUR MEDS BY ELIG CLIN: HCPCS | Performed by: NURSE PRACTITIONER

## 2019-02-25 PROCEDURE — 99213 OFFICE O/P EST LOW 20 MIN: CPT | Performed by: NURSE PRACTITIONER

## 2019-02-25 PROCEDURE — 80305 DRUG TEST PRSMV DIR OPT OBS: CPT | Performed by: NURSE PRACTITIONER

## 2019-02-25 PROCEDURE — 90471 IMMUNIZATION ADMIN: CPT | Performed by: NURSE PRACTITIONER

## 2019-02-25 PROCEDURE — 90732 PPSV23 VACC 2 YRS+ SUBQ/IM: CPT | Performed by: NURSE PRACTITIONER

## 2019-02-25 PROCEDURE — 4004F PT TOBACCO SCREEN RCVD TLK: CPT | Performed by: NURSE PRACTITIONER

## 2019-02-25 PROCEDURE — G8484 FLU IMMUNIZE NO ADMIN: HCPCS | Performed by: NURSE PRACTITIONER

## 2019-02-25 RX ORDER — NALTREXONE 380 MG
KIT INTRAMUSCULAR
COMMUNITY
Start: 2019-02-18 | End: 2019-03-27

## 2019-02-25 RX ORDER — METHYLPREDNISOLONE 4 MG/1
TABLET ORAL
Qty: 1 KIT | Refills: 0 | Status: SHIPPED | OUTPATIENT
Start: 2019-02-25 | End: 2019-03-03

## 2019-02-25 ASSESSMENT — ENCOUNTER SYMPTOMS
COUGH: 0
SHORTNESS OF BREATH: 0

## 2019-03-27 ENCOUNTER — NURSE ONLY (OUTPATIENT)
Dept: FAMILY MEDICINE CLINIC | Age: 29
End: 2019-03-27
Payer: MEDICARE

## 2019-03-27 DIAGNOSIS — Z79.899 HIGH RISK MEDICATION USE: Primary | ICD-10-CM

## 2019-03-27 LAB
AMPHETAMINE SCREEN, URINE: NORMAL
BARBITURATE SCREEN, URINE: NORMAL
BENZODIAZEPINE SCREEN, URINE: NORMAL
BUPRENORPHINE URINE: NORMAL
COCAINE METABOLITE SCREEN URINE: NORMAL
GABAPENTIN SCREEN, URINE: NORMAL
MDMA URINE: NORMAL
METHADONE SCREEN, URINE: NORMAL
METHAMPHETAMINE, URINE: NORMAL
OPIATE SCREEN URINE: NORMAL
OXYCODONE SCREEN URINE: NORMAL
PHENCYCLIDINE SCREEN URINE: NORMAL
PROPOXYPHENE SCREEN, URINE: NORMAL
THC SCREEN, URINE: NORMAL
TRICYCLIC ANTIDEPRESSANTS, UR: NORMAL

## 2019-03-27 PROCEDURE — 80305 DRUG TEST PRSMV DIR OPT OBS: CPT | Performed by: NURSE PRACTITIONER

## 2019-03-27 PROCEDURE — 96372 THER/PROPH/DIAG INJ SC/IM: CPT | Performed by: NURSE PRACTITIONER

## 2019-03-27 NOTE — PROGRESS NOTES
After obtaining consent, and per orders of aleta mercado, injection of vivitrol given in Right upper quad. gluteus by Norah Homans. Patient instructed to remain in clinic for 20 minutes afterwards, and to report any adverse reaction to me immediately.

## 2019-04-26 ENCOUNTER — OFFICE VISIT (OUTPATIENT)
Dept: FAMILY MEDICINE CLINIC | Age: 29
End: 2019-04-26
Payer: MEDICARE

## 2019-04-26 VITALS
BODY MASS INDEX: 27.4 KG/M2 | OXYGEN SATURATION: 95 % | HEART RATE: 87 BPM | WEIGHT: 213.4 LBS | TEMPERATURE: 97.8 F | SYSTOLIC BLOOD PRESSURE: 112 MMHG | DIASTOLIC BLOOD PRESSURE: 80 MMHG

## 2019-04-26 DIAGNOSIS — F11.11 OPIOID ABUSE, IN REMISSION (HCC): Primary | ICD-10-CM

## 2019-04-26 DIAGNOSIS — Z79.899 HIGH RISK MEDICATION USE: ICD-10-CM

## 2019-04-26 PROCEDURE — G8427 DOCREV CUR MEDS BY ELIG CLIN: HCPCS | Performed by: NURSE PRACTITIONER

## 2019-04-26 PROCEDURE — G8417 CALC BMI ABV UP PARAM F/U: HCPCS | Performed by: NURSE PRACTITIONER

## 2019-04-26 PROCEDURE — 99213 OFFICE O/P EST LOW 20 MIN: CPT | Performed by: NURSE PRACTITIONER

## 2019-04-26 PROCEDURE — 4004F PT TOBACCO SCREEN RCVD TLK: CPT | Performed by: NURSE PRACTITIONER

## 2019-04-26 PROCEDURE — 80305 DRUG TEST PRSMV DIR OPT OBS: CPT | Performed by: NURSE PRACTITIONER

## 2019-04-26 RX ORDER — FLUOCINOLONE ACETONIDE 0.1 MG/ML
SOLUTION TOPICAL 2 TIMES DAILY
Qty: 1 BOTTLE | Refills: 0 | Status: SHIPPED | OUTPATIENT
Start: 2019-04-26 | End: 2020-06-12 | Stop reason: SDUPTHER

## 2019-04-26 RX ORDER — NALTREXONE 380 MG
KIT INTRAMUSCULAR
COMMUNITY
Start: 2019-04-12 | End: 2019-05-28

## 2019-04-26 RX ORDER — FLUOCINOLONE ACETONIDE 0.1 MG/ML
SOLUTION TOPICAL 2 TIMES DAILY
COMMUNITY
End: 2019-04-26 | Stop reason: SDUPTHER

## 2019-04-26 RX ORDER — OMEPRAZOLE 20 MG/1
20 CAPSULE, DELAYED RELEASE ORAL DAILY
Qty: 30 CAPSULE | Refills: 5 | Status: SHIPPED | OUTPATIENT
Start: 2019-04-26 | End: 2019-09-24 | Stop reason: SDUPTHER

## 2019-04-26 ASSESSMENT — ENCOUNTER SYMPTOMS
COUGH: 0
SHORTNESS OF BREATH: 0
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
## 2019-04-26 NOTE — PROGRESS NOTES
Patient is present for vivitrol injection    Patient would like a refill on Fluocinolone Acetonide topical solution    Pharmacy and medication reviewed with patient    Visit Information    Have you changed or started any medications since your last visit including any over-the-counter medicines, vitamins, or herbal medicines? no   Have you stopped taking any of your medications? Is so, why? -  no  Are you having any side effects from any of your medications? - no    Have you seen any other physician or provider since your last visit? yes - pysch   Have you had any other diagnostic tests since your last visit?  no   Have you been seen in the emergency room and/or had an admission in a hospital since we last saw you?  no   Have you had your routine dental cleaning in the past 6 months?  no     Do you have an active MyChart account? If no, what is the barrier?   Yes    Patient Care Team:  PHOEBE Hernandez - CNP as PCP - General (Certified Nurse Practitioner)    Medical History Review  Past Medical, Family, and Social History reviewed and does contribute to the patient presenting condition    Health Maintenance   Topic Date Due    Varicella Vaccine (1 of 2 - 13+ 2-dose series) 03/11/2002    DTaP/Tdap/Td vaccine (1 - Tdap) 07/26/2019 (Originally 3/11/2008)    HIV screen  07/26/2019 (Originally 3/11/2004)    Flu vaccine (Season Ended) 09/26/2019 (Originally 9/1/2019)    Pneumococcal 0-64 years Vaccine  Completed

## 2019-04-30 RX ORDER — OMEPRAZOLE 20 MG/1
CAPSULE, DELAYED RELEASE ORAL
Qty: 30 CAPSULE | Refills: 2 | OUTPATIENT
Start: 2019-04-30

## 2019-05-28 ENCOUNTER — NURSE ONLY (OUTPATIENT)
Dept: FAMILY MEDICINE CLINIC | Age: 29
End: 2019-05-28
Payer: MEDICARE

## 2019-05-28 DIAGNOSIS — Z79.899 HIGH RISK MEDICATION USE: Primary | ICD-10-CM

## 2019-05-28 PROCEDURE — 96372 THER/PROPH/DIAG INJ SC/IM: CPT | Performed by: NURSE PRACTITIONER

## 2019-05-28 NOTE — PROGRESS NOTES
After obtaining consent, and per orders of NOEMY Marsh, injection of vivitrol given in Right upper quad. gluteus by Natacha Moreira. Patient instructed to remain in clinic for 20 minutes afterwards, and to report any adverse reaction to me immediately.

## 2019-07-30 ENCOUNTER — OFFICE VISIT (OUTPATIENT)
Dept: FAMILY MEDICINE CLINIC | Age: 29
End: 2019-07-30
Payer: MEDICARE

## 2019-07-30 VITALS
BODY MASS INDEX: 27.22 KG/M2 | WEIGHT: 212 LBS | TEMPERATURE: 97.3 F | SYSTOLIC BLOOD PRESSURE: 110 MMHG | HEART RATE: 93 BPM | DIASTOLIC BLOOD PRESSURE: 78 MMHG | OXYGEN SATURATION: 95 %

## 2019-07-30 DIAGNOSIS — F11.11 OPIOID ABUSE, IN REMISSION (HCC): ICD-10-CM

## 2019-07-30 DIAGNOSIS — Z79.899 HIGH RISK MEDICATION USE: Primary | ICD-10-CM

## 2019-07-30 LAB
AMPHETAMINE SCREEN, URINE: ABNORMAL
BARBITURATE SCREEN, URINE: ABNORMAL
BENZODIAZEPINE SCREEN, URINE: ABNORMAL
BUPRENORPHINE URINE: ABNORMAL
COCAINE METABOLITE SCREEN URINE: ABNORMAL
GABAPENTIN SCREEN, URINE: ABNORMAL
MDMA URINE: ABNORMAL
METHADONE SCREEN, URINE: ABNORMAL
METHAMPHETAMINE, URINE: ABNORMAL
OPIATE SCREEN URINE: ABNORMAL
OXYCODONE SCREEN URINE: ABNORMAL
PHENCYCLIDINE SCREEN URINE: ABNORMAL
PROPOXYPHENE SCREEN, URINE: ABNORMAL
THC SCREEN, URINE: POSITIVE
TRICYCLIC ANTIDEPRESSANTS, UR: ABNORMAL

## 2019-07-30 PROCEDURE — 4004F PT TOBACCO SCREEN RCVD TLK: CPT | Performed by: NURSE PRACTITIONER

## 2019-07-30 PROCEDURE — G8417 CALC BMI ABV UP PARAM F/U: HCPCS | Performed by: NURSE PRACTITIONER

## 2019-07-30 PROCEDURE — G8427 DOCREV CUR MEDS BY ELIG CLIN: HCPCS | Performed by: NURSE PRACTITIONER

## 2019-07-30 PROCEDURE — 80305 DRUG TEST PRSMV DIR OPT OBS: CPT | Performed by: NURSE PRACTITIONER

## 2019-07-30 PROCEDURE — 99213 OFFICE O/P EST LOW 20 MIN: CPT | Performed by: NURSE PRACTITIONER

## 2019-07-30 RX ORDER — NALTREXONE 380 MG
KIT INTRAMUSCULAR
COMMUNITY
Start: 2019-06-11 | End: 2019-07-30

## 2019-07-30 ASSESSMENT — ENCOUNTER SYMPTOMS
COUGH: 0
SHORTNESS OF BREATH: 0

## 2019-08-27 ENCOUNTER — NURSE ONLY (OUTPATIENT)
Dept: FAMILY MEDICINE CLINIC | Age: 29
End: 2019-08-27
Payer: MEDICARE

## 2019-08-27 DIAGNOSIS — Z79.899 HIGH RISK MEDICATION USE: Primary | ICD-10-CM

## 2019-08-27 PROCEDURE — 80305 DRUG TEST PRSMV DIR OPT OBS: CPT | Performed by: NURSE PRACTITIONER

## 2019-08-27 PROCEDURE — 96372 THER/PROPH/DIAG INJ SC/IM: CPT | Performed by: NURSE PRACTITIONER

## 2019-09-24 ENCOUNTER — OFFICE VISIT (OUTPATIENT)
Dept: FAMILY MEDICINE CLINIC | Age: 29
End: 2019-09-24
Payer: MEDICARE

## 2019-09-24 VITALS
BODY MASS INDEX: 27.6 KG/M2 | TEMPERATURE: 97.1 F | OXYGEN SATURATION: 96 % | HEART RATE: 99 BPM | WEIGHT: 215 LBS | SYSTOLIC BLOOD PRESSURE: 112 MMHG | DIASTOLIC BLOOD PRESSURE: 80 MMHG

## 2019-09-24 DIAGNOSIS — F11.11 OPIOID ABUSE, IN REMISSION (HCC): ICD-10-CM

## 2019-09-24 DIAGNOSIS — Z23 NEED FOR INFLUENZA VACCINATION: ICD-10-CM

## 2019-09-24 DIAGNOSIS — Z79.899 HIGH RISK MEDICATION USE: Primary | ICD-10-CM

## 2019-09-24 PROCEDURE — 4004F PT TOBACCO SCREEN RCVD TLK: CPT | Performed by: NURSE PRACTITIONER

## 2019-09-24 PROCEDURE — G8417 CALC BMI ABV UP PARAM F/U: HCPCS | Performed by: NURSE PRACTITIONER

## 2019-09-24 PROCEDURE — G8427 DOCREV CUR MEDS BY ELIG CLIN: HCPCS | Performed by: NURSE PRACTITIONER

## 2019-09-24 PROCEDURE — 99213 OFFICE O/P EST LOW 20 MIN: CPT | Performed by: NURSE PRACTITIONER

## 2019-09-24 PROCEDURE — 80305 DRUG TEST PRSMV DIR OPT OBS: CPT | Performed by: NURSE PRACTITIONER

## 2019-09-24 RX ORDER — OMEPRAZOLE 20 MG/1
20 CAPSULE, DELAYED RELEASE ORAL DAILY
Qty: 30 CAPSULE | Refills: 5 | Status: SHIPPED | OUTPATIENT
Start: 2019-09-24 | End: 2019-11-25 | Stop reason: SDUPTHER

## 2019-09-24 RX ORDER — NALTREXONE 380 MG
KIT INTRAMUSCULAR
COMMUNITY
Start: 2019-09-09 | End: 2019-10-22

## 2019-09-24 RX ORDER — NICOTINE 21 MG/24HR
1 PATCH, TRANSDERMAL 24 HOURS TRANSDERMAL EVERY 24 HOURS
Qty: 30 PATCH | Refills: 3 | Status: SHIPPED | OUTPATIENT
Start: 2019-09-24 | End: 2019-11-19

## 2019-09-24 ASSESSMENT — ENCOUNTER SYMPTOMS
SHORTNESS OF BREATH: 0
COUGH: 0

## 2019-09-24 NOTE — PROGRESS NOTES
Patient is present for vivitrol injection    Patient would like script for nicotine patches    Pharmacy and medication reviewed with patient    Visit Information    Have you changed or started any medications since your last visit including any over-the-counter medicines, vitamins, or herbal medicines? no   Have you stopped taking any of your medications? Is so, why? -  no  Are you having any side effects from any of your medications? - no    Have you seen any other physician or provider since your last visit?  no   Have you had any other diagnostic tests since your last visit?  no   Have you been seen in the emergency room and/or had an admission in a hospital since we last saw you?  no   Have you had your routine dental cleaning in the past 6 months?  no     Do you have an active MyChart account? If no, what is the barrier?   Yes    Patient Care Team:  PHOEBE Love CNP as PCP - General (Certified Nurse Practitioner)  PHOEBE Love CNP as PCP - [facility]    Medical History Review  Past Medical, Family, and Social History reviewed and does contribute to the patient presenting condition    Health Maintenance   Topic Date Due    Varicella Vaccine (2 of 2 - 2-dose childhood series) 11/18/2000    HIV screen  03/11/2004    DTaP/Tdap/Td vaccine (2 - Tdap) 03/11/2008    Flu vaccine (1) 09/26/2019 (Originally 9/1/2019)    Pneumococcal 0-64 years Vaccine  Completed
understanding. Reviewed health maintenance. Instructed to continue current medications, diet andexercise. 1.  Dania received counseling on the following healthy behaviors: nutrition, exercise and medication adherence  2. Patient given educational materials when available - see patient instructionswhen applicable  3. Discussed use, benefit, and side effects of prescribed medications. Barriersto medication compliance addressed. All patient questions answered. Pt voicedunderstanding. 4.  Reviewed prior labs and health maintenance  5. Continuecurrent medications, diet and exercise.     Completed Refills   Requested Prescriptions     Signed Prescriptions Disp Refills    omeprazole (PRILOSEC) 20 MG delayed release capsule 30 capsule 5     Sig: Take 1 capsule by mouth Daily         Electronically signed by Mao Wynne CNP on 9/24/2019 at 2:15 PM

## 2019-10-22 ENCOUNTER — TELEPHONE (OUTPATIENT)
Dept: FAMILY MEDICINE CLINIC | Age: 29
End: 2019-10-22

## 2019-10-22 ENCOUNTER — NURSE ONLY (OUTPATIENT)
Dept: FAMILY MEDICINE CLINIC | Age: 29
End: 2019-10-22
Payer: MEDICARE

## 2019-10-22 DIAGNOSIS — Z79.899 HIGH RISK MEDICATION USE: Primary | ICD-10-CM

## 2019-10-22 DIAGNOSIS — F11.11 OPIOID ABUSE, IN REMISSION (HCC): ICD-10-CM

## 2019-10-22 PROCEDURE — 80305 DRUG TEST PRSMV DIR OPT OBS: CPT | Performed by: NURSE PRACTITIONER

## 2019-10-22 PROCEDURE — 96372 THER/PROPH/DIAG INJ SC/IM: CPT | Performed by: NURSE PRACTITIONER

## 2019-11-15 RX ORDER — OMEPRAZOLE 20 MG/1
CAPSULE, DELAYED RELEASE ORAL
Qty: 30 CAPSULE | Refills: 5 | OUTPATIENT
Start: 2019-11-15

## 2019-11-19 ENCOUNTER — OFFICE VISIT (OUTPATIENT)
Dept: FAMILY MEDICINE CLINIC | Age: 29
End: 2019-11-19
Payer: MEDICARE

## 2019-11-19 VITALS
HEART RATE: 85 BPM | OXYGEN SATURATION: 96 % | TEMPERATURE: 98 F | BODY MASS INDEX: 29.63 KG/M2 | DIASTOLIC BLOOD PRESSURE: 80 MMHG | SYSTOLIC BLOOD PRESSURE: 130 MMHG | WEIGHT: 230.8 LBS

## 2019-11-19 DIAGNOSIS — Z71.6 ENCOUNTER FOR SMOKING CESSATION COUNSELING: ICD-10-CM

## 2019-11-19 DIAGNOSIS — Z23 NEED FOR INFLUENZA VACCINATION: ICD-10-CM

## 2019-11-19 DIAGNOSIS — Z79.899 HIGH RISK MEDICATION USE: Primary | ICD-10-CM

## 2019-11-19 DIAGNOSIS — F11.11 OPIOID ABUSE, IN REMISSION (HCC): ICD-10-CM

## 2019-11-19 PROCEDURE — 99214 OFFICE O/P EST MOD 30 MIN: CPT | Performed by: NURSE PRACTITIONER

## 2019-11-19 PROCEDURE — G8484 FLU IMMUNIZE NO ADMIN: HCPCS | Performed by: NURSE PRACTITIONER

## 2019-11-19 PROCEDURE — G8427 DOCREV CUR MEDS BY ELIG CLIN: HCPCS | Performed by: NURSE PRACTITIONER

## 2019-11-19 PROCEDURE — 1036F TOBACCO NON-USER: CPT | Performed by: NURSE PRACTITIONER

## 2019-11-19 PROCEDURE — G8417 CALC BMI ABV UP PARAM F/U: HCPCS | Performed by: NURSE PRACTITIONER

## 2019-11-19 PROCEDURE — 80305 DRUG TEST PRSMV DIR OPT OBS: CPT | Performed by: NURSE PRACTITIONER

## 2019-11-19 RX ORDER — VARENICLINE TARTRATE 25 MG
KIT ORAL
Qty: 1 BOX | Refills: 0 | Status: SHIPPED | OUTPATIENT
Start: 2019-11-19 | End: 2020-01-14

## 2019-11-19 RX ORDER — VARENICLINE TARTRATE 1 MG/1
1 TABLET, FILM COATED ORAL 2 TIMES DAILY
Qty: 60 TABLET | Refills: 2 | Status: SHIPPED | OUTPATIENT
Start: 2019-11-19 | End: 2020-01-14

## 2019-11-19 ASSESSMENT — ENCOUNTER SYMPTOMS
COUGH: 0
SHORTNESS OF BREATH: 0

## 2019-11-22 RX ORDER — OMEPRAZOLE 20 MG/1
CAPSULE, DELAYED RELEASE ORAL
Qty: 30 CAPSULE | Refills: 5 | OUTPATIENT
Start: 2019-11-22

## 2019-11-25 RX ORDER — OMEPRAZOLE 20 MG/1
20 CAPSULE, DELAYED RELEASE ORAL DAILY
Qty: 30 CAPSULE | Refills: 5 | Status: SHIPPED | OUTPATIENT
Start: 2019-11-25 | End: 2020-06-08

## 2019-12-17 ENCOUNTER — NURSE ONLY (OUTPATIENT)
Dept: FAMILY MEDICINE CLINIC | Age: 29
End: 2019-12-17
Payer: MEDICARE

## 2019-12-17 DIAGNOSIS — F11.11 OPIOID ABUSE, IN REMISSION (HCC): ICD-10-CM

## 2019-12-17 DIAGNOSIS — Z79.899 HIGH RISK MEDICATION USE: Primary | ICD-10-CM

## 2019-12-17 PROCEDURE — 80305 DRUG TEST PRSMV DIR OPT OBS: CPT | Performed by: NURSE PRACTITIONER

## 2019-12-17 PROCEDURE — 96372 THER/PROPH/DIAG INJ SC/IM: CPT | Performed by: NURSE PRACTITIONER

## 2020-01-14 ENCOUNTER — OFFICE VISIT (OUTPATIENT)
Dept: FAMILY MEDICINE CLINIC | Age: 30
End: 2020-01-14
Payer: MEDICARE

## 2020-01-14 VITALS
TEMPERATURE: 97.1 F | SYSTOLIC BLOOD PRESSURE: 132 MMHG | OXYGEN SATURATION: 96 % | HEART RATE: 96 BPM | BODY MASS INDEX: 28.94 KG/M2 | WEIGHT: 225.4 LBS | DIASTOLIC BLOOD PRESSURE: 80 MMHG

## 2020-01-14 LAB
AMPHETAMINE SCREEN, URINE: ABNORMAL
BARBITURATE SCREEN, URINE: ABNORMAL
BENZODIAZEPINE SCREEN, URINE: ABNORMAL
BUPRENORPHINE URINE: ABNORMAL
COCAINE METABOLITE SCREEN URINE: ABNORMAL
GABAPENTIN SCREEN, URINE: ABNORMAL
MDMA URINE: ABNORMAL
METHADONE SCREEN, URINE: ABNORMAL
METHAMPHETAMINE, URINE: ABNORMAL
OPIATE SCREEN URINE: ABNORMAL
OXYCODONE SCREEN URINE: ABNORMAL
PHENCYCLIDINE SCREEN URINE: ABNORMAL
PROPOXYPHENE SCREEN, URINE: ABNORMAL
THC SCREEN, URINE: POSITIVE
TRICYCLIC ANTIDEPRESSANTS, UR: POSITIVE

## 2020-01-14 PROCEDURE — G8417 CALC BMI ABV UP PARAM F/U: HCPCS | Performed by: NURSE PRACTITIONER

## 2020-01-14 PROCEDURE — G8427 DOCREV CUR MEDS BY ELIG CLIN: HCPCS | Performed by: NURSE PRACTITIONER

## 2020-01-14 PROCEDURE — G8484 FLU IMMUNIZE NO ADMIN: HCPCS | Performed by: NURSE PRACTITIONER

## 2020-01-14 PROCEDURE — 99213 OFFICE O/P EST LOW 20 MIN: CPT | Performed by: NURSE PRACTITIONER

## 2020-01-14 PROCEDURE — 80305 DRUG TEST PRSMV DIR OPT OBS: CPT | Performed by: NURSE PRACTITIONER

## 2020-01-14 PROCEDURE — 1036F TOBACCO NON-USER: CPT | Performed by: NURSE PRACTITIONER

## 2020-01-14 ASSESSMENT — ENCOUNTER SYMPTOMS
COUGH: 0
SHORTNESS OF BREATH: 0

## 2020-01-14 NOTE — PROGRESS NOTES
Patient is present for [employer] injection    Pharmacy and medication reviewed with patient
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
## 2020-02-11 ENCOUNTER — NURSE ONLY (OUTPATIENT)
Dept: FAMILY MEDICINE CLINIC | Age: 30
End: 2020-02-11
Payer: MEDICARE

## 2020-02-11 PROCEDURE — 80305 DRUG TEST PRSMV DIR OPT OBS: CPT | Performed by: NURSE PRACTITIONER

## 2020-02-11 PROCEDURE — 96372 THER/PROPH/DIAG INJ SC/IM: CPT | Performed by: NURSE PRACTITIONER

## 2020-02-11 NOTE — PROGRESS NOTES
After obtaining consent, and per orders of NOEMY Marsh, injection of vivitrol given in Left upper quad. gluteus by Jo Garsia. Patient instructed to remain in clinic for 20 minutes afterwards, and to report any adverse reaction to me immediately.

## 2020-06-04 RX ORDER — FLUOCINOLONE ACETONIDE 0.1 MG/ML
SOLUTION TOPICAL
Qty: 60 ML | OUTPATIENT
Start: 2020-06-04

## 2020-06-08 RX ORDER — OMEPRAZOLE 20 MG/1
CAPSULE, DELAYED RELEASE ORAL
Qty: 30 CAPSULE | Refills: 5 | Status: SHIPPED | OUTPATIENT
Start: 2020-06-08 | End: 2020-12-01

## 2020-06-11 ENCOUNTER — NURSE TRIAGE (OUTPATIENT)
Dept: OTHER | Facility: CLINIC | Age: 30
End: 2020-06-11

## 2020-06-11 NOTE — TELEPHONE ENCOUNTER
Reason for Disposition   [1] Small swelling or lump AND [2] unexplained AND [3] present > 1 week    Answer Assessment - Initial Assessment Questions  1. APPEARANCE of SWELLING: \"What does it look like? \" (e.g., lymph node, insect bite, mole)      Can feel a mass on his right chest muscle  2. SIZE: \"How large is the swelling? \" (inches, cm or compare to coins)      Started out about marble size. Now feels a little bigger but has \"fingers\" coming off of it. 3. LOCATION: \"Where is the swelling located? \"      Right chest   4. ONSET: \"When did the swelling start? \"      Started a couple of months ago  5. PAIN: \"Is it painful? \" If so, ask: \"How much? \"     Pain when touching it, 4/10  6. ITCH: \"Does it itch? \" If so, ask: \"How much? \"      No itching  7. CAUSE: \"What do you think caused the swelling?\"       8. OTHER SYMPTOMS: \"Do you have any other symptoms? \" (e.g., fever)      No fever, no other symptoms    Protocols used: SKIN LUMP OR LOCALIZED SWELLING-ADULT-AH    Received call from [facility]. Pt calling with small lump on his right side of chest. Noticed it a couple of months ago. Is a little bigger than a marble size. Some pain when touching. Recommend he is seen within 3 days, call back if any new or worsening symptoms. Call soft transferred to [address] to schedule appointment. Please do not reply to the triage nurse through this encounter. Any subsequent communication should be directly with the patient.

## 2020-06-12 ENCOUNTER — OFFICE VISIT (OUTPATIENT)
Dept: FAMILY MEDICINE CLINIC | Age: 30
End: 2020-06-12
Payer: MEDICARE

## 2020-06-12 VITALS
SYSTOLIC BLOOD PRESSURE: 122 MMHG | OXYGEN SATURATION: 97 % | HEART RATE: 99 BPM | HEIGHT: 74 IN | WEIGHT: 217.2 LBS | TEMPERATURE: 96.8 F | DIASTOLIC BLOOD PRESSURE: 86 MMHG | BODY MASS INDEX: 27.87 KG/M2

## 2020-06-12 PROCEDURE — G8417 CALC BMI ABV UP PARAM F/U: HCPCS | Performed by: NURSE PRACTITIONER

## 2020-06-12 PROCEDURE — G8427 DOCREV CUR MEDS BY ELIG CLIN: HCPCS | Performed by: NURSE PRACTITIONER

## 2020-06-12 PROCEDURE — 1036F TOBACCO NON-USER: CPT | Performed by: NURSE PRACTITIONER

## 2020-06-12 PROCEDURE — 99213 OFFICE O/P EST LOW 20 MIN: CPT | Performed by: NURSE PRACTITIONER

## 2020-06-12 RX ORDER — DEXTROAMPHETAMINE SACCHARATE, AMPHETAMINE ASPARTATE MONOHYDRATE, DEXTROAMPHETAMINE SULFATE AND AMPHETAMINE SULFATE 5; 5; 5; 5 MG/1; MG/1; MG/1; MG/1
20 CAPSULE, EXTENDED RELEASE ORAL EVERY MORNING
COMMUNITY
End: 2022-05-26 | Stop reason: SDUPTHER

## 2020-06-12 RX ORDER — FLUOCINOLONE ACETONIDE 0.1 MG/ML
SOLUTION TOPICAL 2 TIMES DAILY
Qty: 1 BOTTLE | Refills: 0 | Status: SHIPPED | OUTPATIENT
Start: 2020-06-12 | End: 2020-12-09

## 2020-06-12 ASSESSMENT — ENCOUNTER SYMPTOMS
COUGH: 0
SHORTNESS OF BREATH: 0

## 2020-06-12 NOTE — PROGRESS NOTES
Patient is present complaining of lump on right side of chest  Patient states he noticed this a few months ago  Patient states it has gotten bigger  States it is painful to the touch
Instructed to continue current medications, diet andexercise. 1.  Dania received counseling on the following healthy behaviors: medication adherence  2. Patient given educationalmaterials when available - see patient instructions when applicable  3. Discussed use, benefit, and side effects of prescribed medications. Barriersto medication compliance addressed. All patient questions answered. Pt voiced understanding. 4.  Reviewed prior labs and health maintenance when applicable. 5.  Continue current medications, diet and exercise. CompletedRefills   Requested Prescriptions     Signed Prescriptions Disp Refills    fluocinolone acetonide (SYNALAR) 0.01 % external solution 1 Bottle 0     Sig: Apply topically 2 times daily Apply topically 2 times daily. This note was transcribed using dictation with Dragon services. Efforts were made to correct any errors but some words may be misinterpreted.     Electronically signed by Shi Francois CNP on 6/12/2020 at 1:20 PM

## 2020-06-17 ENCOUNTER — TELEPHONE (OUTPATIENT)
Dept: FAMILY MEDICINE CLINIC | Age: 30
End: 2020-06-17

## 2020-06-17 NOTE — TELEPHONE ENCOUNTER
[facility] scheduling called in stating that with the [address] they would also like the zuhair ordered, because they will do the zuhair first and then focus the US on the breast that needs it.

## 2020-07-09 ENCOUNTER — APPOINTMENT (OUTPATIENT)
Dept: ULTRASOUND IMAGING | Age: 30
End: 2020-07-09
Payer: MEDICARE

## 2020-07-09 ENCOUNTER — HOSPITAL ENCOUNTER (OUTPATIENT)
Dept: MAMMOGRAPHY | Age: 30
Discharge: HOME | End: 2020-07-11
Payer: MEDICARE

## 2020-07-09 PROCEDURE — G0279 TOMOSYNTHESIS, MAMMO: HCPCS

## 2020-09-29 ENCOUNTER — VIRTUAL VISIT (OUTPATIENT)
Dept: FAMILY MEDICINE CLINIC | Age: 30
End: 2020-09-29
Payer: MEDICARE

## 2020-09-29 PROBLEM — I72.0 CAROTID ANEURYSM, LEFT (HCC): Status: ACTIVE | Noted: 2018-05-31

## 2020-09-29 PROCEDURE — 99214 OFFICE O/P EST MOD 30 MIN: CPT | Performed by: NURSE PRACTITIONER

## 2020-09-29 PROCEDURE — G8427 DOCREV CUR MEDS BY ELIG CLIN: HCPCS | Performed by: NURSE PRACTITIONER

## 2020-09-29 ASSESSMENT — ENCOUNTER SYMPTOMS
SHORTNESS OF BREATH: 0
COUGH: 0
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
## 2020-12-01 RX ORDER — OMEPRAZOLE 20 MG/1
CAPSULE, DELAYED RELEASE ORAL
Qty: 30 CAPSULE | Refills: 5 | Status: SHIPPED | OUTPATIENT
Start: 2020-12-01 | End: 2022-05-26 | Stop reason: SDUPTHER

## 2020-12-01 NOTE — TELEPHONE ENCOUNTER
Last visit: 9/29/20  Last Med refill: 6/8/20  Does patient have enough medication for 72 hours: Yes    Next Visit Date:  No future appointments.     Health Maintenance   Topic Date Due    Meningococcal (ACWY) vaccine (1 - Risk start before 7 months 4-dose series) 1989    Hib vaccine (1 of 1 - Risk 1-dose series) 06/11/1990    Meningococcal B vaccine (1 of 4 - Increased Risk Bexsero 2-dose series) 03/11/1999    Varicella vaccine (2 of 2 - 2-dose childhood series) 11/18/2000    HIV screen  03/11/2004    DTaP/Tdap/Td vaccine (2 - Tdap) 03/11/2008    Pneumococcal 0-64 years Vaccine (2 of 3 - PCV13) 02/25/2020    Flu vaccine (1) 09/01/2020    Hepatitis A vaccine  Aged Out    Hepatitis B vaccine  Aged Out       No results found for: LABA1C          ( goal A1C is < 7)   No results found for: LABMICR  LDL Calculated (mg/dL)   Date Value   09/21/2016 105       (goal LDL is <100)   AST (U/L)   Date Value   04/30/2018 35     ALT (U/L)   Date Value   04/30/2018 42 (H)     BUN (mg/dL)   Date Value   04/30/2018 14     BP Readings from Last 3 Encounters:   06/12/20 122/86   01/14/20 132/80   11/19/19 130/80          (goal 120/80)    All Future Testing planned in [facility]  Lab Frequency Next Occurrence   CBC Once 12/13/2020   Comprehensive Metabolic Panel Once 15/86/7173   Lipid Panel Once 12/13/2020   US BREAST COMPLETE RIGHT Once 12/13/2020               Patient Active Problem List:     Respiratory insufficiency     Traumatic brain injury ([facility]     PTSD (post-traumatic stress disorder)     Suicidal ideation     Depression     Opioid abuse, in remission ([facility] [facility]     Carotid aneurysm, left (HCC)     Depression, major, recurrent ([facility] [address]

## 2021-11-27 ENCOUNTER — HOSPITAL ENCOUNTER (EMERGENCY)
Facility: CLINIC | Age: 31
Discharge: HOME | End: 2021-11-27
Attending: EMERGENCY MEDICINE
Payer: MEDICARE

## 2021-11-27 ENCOUNTER — APPOINTMENT (OUTPATIENT)
Dept: ULTRASOUND IMAGING | Facility: CLINIC | Age: 31
End: 2021-11-27
Payer: MEDICARE

## 2021-11-27 VITALS
DIASTOLIC BLOOD PRESSURE: 100 MMHG | RESPIRATION RATE: 18 BRPM | HEART RATE: 99 BPM | OXYGEN SATURATION: 94 % | SYSTOLIC BLOOD PRESSURE: 149 MMHG

## 2021-11-27 DIAGNOSIS — I86.1 BILATERAL VARICOCELES: Primary | ICD-10-CM

## 2021-11-27 LAB
BILIRUBIN URINE: NEGATIVE
COLOR: YELLOW
COMMENT UA: ABNORMAL
GLUCOSE URINE: NEGATIVE
KETONES, URINE: NEGATIVE
LEUKOCYTE ESTERASE, URINE: NEGATIVE
NITRITE, URINE: NEGATIVE
PH UA: 5.5 (ref 5–8)
PROTEIN UA: NEGATIVE
SPECIFIC GRAVITY UA: 1 (ref 1–1.03)
TURBIDITY: CLEAR
URINE HGB: NEGATIVE
UROBILINOGEN, URINE: NORMAL

## 2021-11-27 PROCEDURE — 87591 N.GONORRHOEAE DNA AMP PROB: CPT

## 2021-11-27 PROCEDURE — 96372 THER/PROPH/DIAG INJ SC/IM: CPT

## 2021-11-27 PROCEDURE — 99283 EMERGENCY DEPT VISIT LOW MDM: CPT

## 2021-11-27 PROCEDURE — 6360000002 HC RX W HCPCS: Performed by: REGISTERED NURSE

## 2021-11-27 PROCEDURE — 81003 URINALYSIS AUTO W/O SCOPE: CPT

## 2021-11-27 PROCEDURE — 87491 CHLMYD TRACH DNA AMP PROBE: CPT

## 2021-11-27 PROCEDURE — 76870 US EXAM SCROTUM: CPT

## 2021-11-27 RX ORDER — KETOROLAC TROMETHAMINE 30 MG/ML
30 INJECTION, SOLUTION INTRAMUSCULAR; INTRAVENOUS ONCE
Status: COMPLETED | OUTPATIENT
Start: 2021-11-27 | End: 2021-11-27

## 2021-11-27 RX ORDER — KETOROLAC TROMETHAMINE 10 MG/1
10 TABLET, FILM COATED ORAL EVERY 6 HOURS PRN
Qty: 20 TABLET | Refills: 0 | Status: SHIPPED | OUTPATIENT
Start: 2021-11-27 | End: 2022-05-26

## 2021-11-27 RX ADMIN — KETOROLAC TROMETHAMINE 30 MG: 30 INJECTION, SOLUTION INTRAMUSCULAR; INTRAVENOUS at 22:13

## 2021-11-27 ASSESSMENT — ENCOUNTER SYMPTOMS
EYES NEGATIVE: 1
RESPIRATORY NEGATIVE: 1
GASTROINTESTINAL NEGATIVE: 1

## 2021-11-27 ASSESSMENT — PAIN SCALES - GENERAL
PAINLEVEL_OUTOF10: 8
PAINLEVEL_OUTOF10: 8

## 2021-11-28 NOTE — ED PROVIDER NOTES
Cholesterol in an other family member; Other in an other family member. Psychiatric History: None    Allergies: Ceclor [cefaclor], Morphine, and Vermox [mebendazole]    Home Medications:   Prior to Admission medications    Medication Sig Start Date End Date Taking? Authorizing Provider   ketorolac (TORADOL) 10 MG tablet Take 1 tablet by mouth every 6 hours as needed for Pain 11/27/21 12/2/21 Yes PHOEBE Mariscal - CNP   fluocinolone acetonide (SYNALAR) 0.01 % external solution apply to affected area twice a day 12/9/20   PHOEBE Mckeon CNP   omeprazole (PRILOSEC) 20 MG delayed release capsule take 1 capsule by mouth once daily 12/1/20   PHOEBE Mckeon CNP   amphetamine-dextroamphetamine (ADDERALL XR) 20 MG extended release capsule Take 20 mg by mouth every morning. Historical Provider, MD   venlafaxine (EFFEXOR XR) 150 MG extended release capsule take 1 capsule by mouth once daily 4/3/18   Historical Provider, MD   gabapentin (NEURONTIN) 600 MG tablet take 1 tablet by mouth three times a day 10/30/17   Historical Provider, MD   sertraline (ZOLOFT) 100 MG tablet Take 2 tablets at bedtime 2/10/17   Teja Kimball MD       REVIEW OF SYSTEMS  (2-9 systems for level 4, 10 ormore for level 5)      Review of Systems   Constitutional: Negative. HENT: Negative. Eyes: Negative. Respiratory: Negative. Cardiovascular: Negative. Gastrointestinal: Negative. Endocrine: Negative. Genitourinary: Positive for difficulty urinating, scrotal swelling and testicular pain. Negative for dysuria, hematuria, penile discharge, penile pain and penile swelling. Musculoskeletal: Negative. Skin: Negative. Neurological: Negative. All other systems negative except as marked. PHYSICAL EXAM  (up to 7 for level 4, 8 or more for level 5)      INITIAL VITALS:  blood pressure is 149/100 (abnormal) and his pulse is 99. His respiration is 18 and oxygen saturation is 94%. Vital signs reviewed. Physical Exam  Exam conducted with a chaperone present. Constitutional:       General: He is not in acute distress. Appearance: He is not toxic-appearing. HENT:      Head: Normocephalic and atraumatic. Cardiovascular:      Rate and Rhythm: Normal rate and regular rhythm. Pulses: Normal pulses. Heart sounds: Normal heart sounds. Pulmonary:      Effort: Pulmonary effort is normal.      Breath sounds: Normal breath sounds. Abdominal:      General: Abdomen is flat. Bowel sounds are normal.      Palpations: Abdomen is soft. Hernia: There is no hernia in the left inguinal area or right inguinal area. Genitourinary:     Penis: Normal.       Testes:         Right: Tenderness and swelling present. Left: Mass, tenderness or swelling not present. Epididymis:      Right: Normal.      Left: Normal.   Musculoskeletal:         General: Normal range of motion. Cervical back: Neck supple. Lymphadenopathy:      Lower Body: No right inguinal adenopathy. No left inguinal adenopathy. Skin:     General: Skin is warm and dry. Capillary Refill: Capillary refill takes less than 2 seconds. Neurological:      General: No focal deficit present. Mental Status: He is alert. Mental status is at baseline. Psychiatric:         Mood and Affect: Mood normal.         Behavior: Behavior normal.           DIFFERENTIAL DIAGNOSIS / MDM     After my physical exam, I do have concerns for UTI versus STD versus testicular torsion. I will obtain a UA along with GC chlamydia urine. Also obtain an ultrasound of the testicles to rule out any testicular torsion. Did offer patient pain medication on arrival which she declined at this time. UA is unremarkable. Scrotal ultrasound shows normal appearance of the testes without findings of mass or torsion. Spermatoceles or epididymal cyst associated with each epididymis with no findings of epididymitis.   Borderline bilateral varicoceles, potential source of scrotal discomfort was noted in the ultrasound. Plan discharge patient to follow-up with PCP. I also referred patient to urologist to follow-up Monday for management of his varicoceles. I will also provide patient with Toradol for pain control as states he does not want any narcotics. Patient is agreement this plan at this time. All question concerns answered at this time. PLAN (LABS / IMAGING / EKG):  Orders Placed This Encounter   Procedures    C.trachomatis N.gonorrhoeae DNA, Urine    US SCROTUM W LIMITED DUPLEX    Urinalysis Reflex to Culture       MEDICATIONS ORDERED:  Orders Placed This Encounter   Medications    ketorolac (TORADOL) injection 30 mg    ketorolac (TORADOL) 10 MG tablet     Sig: Take 1 tablet by mouth every 6 hours as needed for Pain     Dispense:  20 tablet     Refill:  0       Controlled Substances Monitoring:     DIAGNOSTIC RESULTS     EKG: All EKG's are interpreted by the Emergency Department Physician who either signs or Co-signs this chart in the absenceof a cardiologist.        RADIOLOGY: All images are read by the radiologist and their interpretations are reviewed. [address]   Final Result   1. Normal sonographic appearance of the testes without findings of mass or   torsion. 2. Spermatoceles or epididymal cysts associated with each epididymis with no   findings of epididymitis. 3. Borderline bilateral varicoceles, a potential source of scrotal discomfort. No results found.     LABS:  Results for orders placed or performed during the hospital encounter of 11/27/21   Urinalysis Reflex to Culture    Specimen: Urine, clean catch   Result Value Ref Range    Color, UA Yellow Yellow    Turbidity UA Clear Clear    Glucose, Ur NEGATIVE NEGATIVE    Bilirubin Urine NEGATIVE NEGATIVE    Ketones, Urine NEGATIVE NEGATIVE    Specific Gravity, UA 1.003 (L) 1.005 - 1.030    Urine Hgb NEGATIVE NEGATIVE    pH, UA 5.5 5.0 - 8.0    Protein, UA NEGATIVE NEGATIVE    Urobilinogen, Urine Normal Normal    Nitrite, Urine NEGATIVE NEGATIVE    Leukocyte Esterase, Urine NEGATIVE NEGATIVE    Urinalysis Comments       Microscopic exam not performed based on chemical results unless requested in original order. Urinalysis Comments          Urinalysis Comments       Utilizing a urinalysis as the only screening method to exclude a potential uropathogen can be unreliable in many patient populations. Rapid screening tests are less sensitive than culture and if UTI is a clinical possibility, culture should be considered despite a negative urinalysis. EMERGENCY DEPARTMENT COURSE           Vitals:    Vitals:    11/27/21 2010   BP: (!) 149/100   Pulse: 99   Resp: 18   SpO2: 94%     -------------------------  BP: (!) 149/100,  , Pulse: 99, Resp: 18      RE-EVALUATION:  See ED Course notes above. CONSULTS:  None    PROCEDURES:  None    FINAL IMPRESSION      1. Bilateral varicoceles          DISPOSITION / PLAN     CONDITION ON DISPOSITION:   Stable for discharge. PATIENT REFERRED TO:  PHOEBE Alvarenga CNP  [address] [place] [address]  [phone]    Call in 1 day      [facility]  [address]   [phone]    If symptoms worsen    Karlie Gallardo MD  [phone] [address]  [address] [facility], [address] [facility]    Call in 1 day        DISCHARGE MEDICATIONS:  New Prescriptions    KETOROLAC (TORADOL) 10 MG TABLET    Take 1 tablet by mouth every 6 hours as needed for Pain       PHOEBE Medley CNP   Emergency Medicine Nurse Practitioner    (Please note that portions of this note were completed with a voice recognition program.  Efforts were made to edit the dictations but occasionally words aremis-transcribed.)       PHOEBE Medley CNP  11/27/21 1072
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
## 2021-11-29 LAB
C. TRACHOMATIS DNA ,URINE: NEGATIVE
N. GONORRHOEAE DNA, URINE: NEGATIVE
SPECIMEN DESCRIPTION: NORMAL

## 2022-05-26 ENCOUNTER — TELEMEDICINE (OUTPATIENT)
Dept: FAMILY MEDICINE CLINIC | Age: 32
End: 2022-05-26
Payer: MEDICARE

## 2022-05-26 DIAGNOSIS — S06.9X9S TRAUMATIC BRAIN INJURY WITH LOSS OF CONSCIOUSNESS, SEQUELA (HCC): ICD-10-CM

## 2022-05-26 DIAGNOSIS — F43.10 PTSD (POST-TRAUMATIC STRESS DISORDER): ICD-10-CM

## 2022-05-26 DIAGNOSIS — F90.9 ATTENTION DEFICIT HYPERACTIVITY DISORDER (ADHD), UNSPECIFIED ADHD TYPE: ICD-10-CM

## 2022-05-26 DIAGNOSIS — F33.41 RECURRENT MAJOR DEPRESSIVE DISORDER, IN PARTIAL REMISSION (HCC): Primary | ICD-10-CM

## 2022-05-26 PROCEDURE — G8427 DOCREV CUR MEDS BY ELIG CLIN: HCPCS | Performed by: NURSE PRACTITIONER

## 2022-05-26 PROCEDURE — 99213 OFFICE O/P EST LOW 20 MIN: CPT | Performed by: NURSE PRACTITIONER

## 2022-05-26 RX ORDER — OMEPRAZOLE 20 MG/1
CAPSULE, DELAYED RELEASE ORAL
Qty: 30 CAPSULE | Refills: 5 | Status: SHIPPED | OUTPATIENT
Start: 2022-05-26

## 2022-05-26 RX ORDER — GABAPENTIN 600 MG/1
TABLET ORAL
Qty: 90 TABLET | Refills: 0 | Status: SHIPPED | OUTPATIENT
Start: 2022-05-26 | End: 2022-06-23 | Stop reason: SDUPTHER

## 2022-05-26 RX ORDER — DEXTROAMPHETAMINE SACCHARATE, AMPHETAMINE ASPARTATE MONOHYDRATE, DEXTROAMPHETAMINE SULFATE AND AMPHETAMINE SULFATE 5; 5; 5; 5 MG/1; MG/1; MG/1; MG/1
20 CAPSULE, EXTENDED RELEASE ORAL 2 TIMES DAILY
Qty: 60 CAPSULE | Refills: 0 | Status: SHIPPED | OUTPATIENT
Start: 2022-05-26 | End: 2022-06-23 | Stop reason: SDUPTHER

## 2022-05-26 SDOH — ECONOMIC STABILITY: FOOD INSECURITY: WITHIN THE PAST 12 MONTHS, YOU WORRIED THAT YOUR FOOD WOULD RUN OUT BEFORE YOU GOT MONEY TO BUY MORE.: NEVER TRUE

## 2022-05-26 SDOH — ECONOMIC STABILITY: FOOD INSECURITY: WITHIN THE PAST 12 MONTHS, THE FOOD YOU BOUGHT JUST DIDN'T LAST AND YOU DIDN'T HAVE MONEY TO GET MORE.: NEVER TRUE

## 2022-05-26 ASSESSMENT — ENCOUNTER SYMPTOMS
COUGH: 0
SHORTNESS OF BREATH: 0

## 2022-05-26 ASSESSMENT — PATIENT HEALTH QUESTIONNAIRE - PHQ9
2. FEELING DOWN, DEPRESSED OR HOPELESS: 0
SUM OF ALL RESPONSES TO PHQ9 QUESTIONS 1 & 2: 0
4. FEELING TIRED OR HAVING LITTLE ENERGY: 0
3. TROUBLE FALLING OR STAYING ASLEEP: 0
6. FEELING BAD ABOUT YOURSELF - OR THAT YOU ARE A FAILURE OR HAVE LET YOURSELF OR YOUR FAMILY DOWN: 0
SUM OF ALL RESPONSES TO PHQ QUESTIONS 1-9: 0
SUM OF ALL RESPONSES TO PHQ QUESTIONS 1-9: 0
10. IF YOU CHECKED OFF ANY PROBLEMS, HOW DIFFICULT HAVE THESE PROBLEMS MADE IT FOR YOU TO DO YOUR WORK, TAKE CARE OF THINGS AT HOME, OR GET ALONG WITH OTHER PEOPLE: 0
1. LITTLE INTEREST OR PLEASURE IN DOING THINGS: 0
SUM OF ALL RESPONSES TO PHQ QUESTIONS 1-9: 0
SUM OF ALL RESPONSES TO PHQ QUESTIONS 1-9: 0
5. POOR APPETITE OR OVEREATING: 0
8. MOVING OR SPEAKING SO SLOWLY THAT OTHER PEOPLE COULD HAVE NOTICED. OR THE OPPOSITE, BEING SO FIGETY OR RESTLESS THAT YOU HAVE BEEN MOVING AROUND A LOT MORE THAN USUAL: 0
7. TROUBLE CONCENTRATING ON THINGS, SUCH AS READING THE NEWSPAPER OR WATCHING TELEVISION: 0
9. THOUGHTS THAT YOU WOULD BE BETTER OFF DEAD, OR OF HURTING YOURSELF: 0

## 2022-05-26 ASSESSMENT — SOCIAL DETERMINANTS OF HEALTH (SDOH): HOW HARD IS IT FOR YOU TO PAY FOR THE VERY BASICS LIKE FOOD, HOUSING, MEDICAL CARE, AND HEATING?: NOT HARD AT ALL

## 2022-05-26 NOTE — PROGRESS NOTES
Patient is present for med refills    Patient states his psychiatrist retired and would like a referral for someone else

## 2022-05-26 NOTE — PROGRESS NOTES
Dayami Campo (:  1990) is a Established patient, here for evaluation of the following:    Assessment & Plan   Below is the assessment and plan developed based on review of pertinent history, physical exam, labs, studies, and medications. 1. Recurrent major depressive disorder, in partial remission (HCC)  -     Elier Kaplan MD, Psychiatry, [place]  2. Traumatic brain injury with loss of consciousness, sequela ([facility] [address]  -     Elier Kaplan MD, Psychiatry, [place]  3. PTSD (post-traumatic stress disorder)  -     Elier Kaplan MD, Psychiatry, [place]  4. Attention deficit hyperactivity disorder (ADHD), unspecified ADHD type  -     amphetamine-dextroamphetamine (ADDERALL XR) 20 MG extended release capsule; Take 1 capsule by mouth in the morning and at bedtime for 30 days. , Disp-60 capsule, R-0Normal    No follow-ups on file. Subjective   HPI   Shan Haley is here in need of med refills. He has hope seeing the same psychologist for many years and she retired. He needs a referral to a new psychiatrist but also refills on his adderall and gabapentin. He is stable on his meds. Doing well. Review of Systems   Constitutional: Negative for chills and fever. Respiratory: Negative for cough and shortness of breath. Cardiovascular: Negative for chest pain and leg swelling. Psychiatric/Behavioral: Negative for dysphoric mood. The patient is not nervous/anxious.            Objective   Patient-Reported Vitals  No data recorded     Physical Exam  [INSTRUCTIONS:  \"[x]\" Indicates a positive item  \"[]\" Indicates a negative item  -- DELETE ALL ITEMS NOT EXAMINED]    Constitutional: [x] Appears well-developed and well-nourished [x] No apparent distress      [] Abnormal -     Mental status: [x] Alert and awake  [x] Oriented to person/place/time [x] Able to follow commands    [] Abnormal -     Eyes:   EOM    [x]  Normal    [] Abnormal -   Sclera  [x]  Normal    [] Abnormal -          Discharge [x]  None visible   [] Abnormal -     HENT: [x] Normocephalic, atraumatic  [] Abnormal -   [x] Mouth/Throat: Mucous membranes are moist    External Ears [x] Normal  [] Abnormal -    Neck: [x] No visualized mass [] Abnormal -     Pulmonary/Chest: [x] Respiratory effort normal   [x] No visualized signs of difficulty breathing or respiratory distress        [] Abnormal -      Musculoskeletal:   [x] Normal gait with no signs of ataxia         [x] Normal range of motion of neck        [] Abnormal -     Neurological:        [x] No Facial Asymmetry (Cranial nerve 7 motor function) (limited exam due to video visit)          [x] No gaze palsy        [] Abnormal -          Skin:        [x] No significant exanthematous lesions or discoloration noted on facial skin         [] Abnormal -            Psychiatric:       [x] Normal Affect [] Abnormal -        [x] No Hallucinations    Other pertinent observable physical exam findings:-      Michel Martinez, was evaluated through a synchronous (real-time) audio-video encounter. The patient (or guardian if applicable) is aware that this is a billable service, which includes applicable co-pays. This Virtual Visit was conducted with patient's (and/or legal guardian's) consent. The visit was conducted pursuant to the emergency declaration under the [address] [place] [address] authority and the [employer] Act. Patient identification was verified, and a caregiver was present when appropriate. The patient was located at Home: [address] [place] [address].    Provider was located at Home ([facility]): PHOEBE Harman - IMER

## 2022-06-22 DIAGNOSIS — F90.9 ATTENTION DEFICIT HYPERACTIVITY DISORDER (ADHD), UNSPECIFIED ADHD TYPE: ICD-10-CM

## 2022-06-22 NOTE — TELEPHONE ENCOUNTER
Last visit: 05/26/22  Last Med refill: 05/26/22  Does patient have enough medication for 72 hours: No:   Patient states Dr Nereyda Simmons office is not in network. He will call [employer] to see who is in network for PSY  He states will need a refill untill he can get scheduled    Next Visit Date:  No future appointments.     Health Maintenance   Topic Date Due    Meningococcal (ACWY) vaccine (1 - Risk start 2-23 months series) Never done    Hib vaccine (1 of 1 - Risk 1-dose series) Never done    COVID-19 Vaccine (1) Never done    Meningococcal B vaccine (1 of 4 - Increased Risk Bexsero 2-dose series) Never done    Varicella vaccine (2 of 2 - 2-dose childhood series) 11/18/2000    HIV screen  Never done    DTaP/Tdap/Td vaccine (2 - Tdap) 04/08/2005    Hepatitis C screen  Never done    Pneumococcal 0-64 years Vaccine (2 - PCV) 02/25/2020    Flu vaccine (Season Ended) 09/01/2022    Depression Monitoring  05/26/2023    Hepatitis A vaccine  Aged Out    Hepatitis B vaccine  Aged Out       No results found for: LABA1C          ( goal A1C is < 7)   No results found for: LABMICR  LDL Calculated (mg/dL)   Date Value   09/21/2016 105       (goal LDL is <100)   AST (U/L)   Date Value   04/30/2018 35     ALT (U/L)   Date Value   04/30/2018 42 (H)     BUN (mg/dL)   Date Value   04/30/2018 14     BP Readings from Last 3 Encounters:   11/27/21 (!) 149/100   06/12/20 122/86   01/14/20 132/80          (goal 120/80)    All Future Testing planned in [facility]  Lab Frequency Next Occurrence               Patient Active Problem List:     Respiratory insufficiency     Traumatic brain injury ([facility] [address]     PTSD (post-traumatic stress disorder)     Suicidal ideation     Depression     Opioid abuse, in remission ([facility] [address]     Carotid aneurysm, left (HCC)     Depression, major, recurrent ([facility] [address]

## 2022-06-23 RX ORDER — DEXTROAMPHETAMINE SACCHARATE, AMPHETAMINE ASPARTATE MONOHYDRATE, DEXTROAMPHETAMINE SULFATE AND AMPHETAMINE SULFATE 5; 5; 5; 5 MG/1; MG/1; MG/1; MG/1
20 CAPSULE, EXTENDED RELEASE ORAL 2 TIMES DAILY
Qty: 60 CAPSULE | Refills: 0 | Status: SHIPPED | OUTPATIENT
Start: 2022-06-23 | End: 2022-07-21 | Stop reason: SDUPTHER

## 2022-06-23 RX ORDER — GABAPENTIN 600 MG/1
TABLET ORAL
Qty: 90 TABLET | Refills: 0 | Status: SHIPPED | OUTPATIENT
Start: 2022-06-23 | End: 2022-07-21 | Stop reason: SDUPTHER

## 2022-06-23 NOTE — TELEPHONE ENCOUNTER
Pt states he does not have appointment at this time. He needs to [facility] [employer] to see who is in network.   Pt was advised to call today and to schedule with PSY

## 2022-07-20 NOTE — TELEPHONE ENCOUNTER
Last visit: 05/26/2022  Last Med refill: 07/02/2022  Does patient have enough medication for 72 hours: Yes    Next Visit Date:  Future Appointments   Date Time Provider Nicolas Aden   7/21/2022 10:15 AM PHOEBE Hernandes CNP [facility] [address] Maintenance   Topic Date Due    COVID-19 Vaccine (1) Never done    Meningococcal (ACWY) vaccine (1 - Risk start 2-23 months series) Never done    Hib vaccine (1 of 1 - Risk 1-dose series) Never done    Meningococcal B vaccine (1 of 4 - Increased Risk Bexsero 2-dose series) Never done    Varicella vaccine (2 of 2 - 2-dose childhood series) 11/18/2000    HIV screen  Never done    DTaP/Tdap/Td vaccine (2 - Tdap) 04/08/2005    Hepatitis C screen  Never done    Pneumococcal 0-64 years Vaccine (2 - PCV) 02/25/2020    Flu vaccine (1) 09/01/2022    Depression Monitoring  05/26/2023    Hepatitis A vaccine  Aged Out    Hepatitis B vaccine  Aged Out       No results found for: LABA1C          ( goal A1C is < 7)   No results found for: LABMICR  LDL Calculated (mg/dL)   Date Value   09/21/2016 105       (goal LDL is <100)   AST (U/L)   Date Value   04/30/2018 35     ALT (U/L)   Date Value   04/30/2018 42 (H)     BUN (mg/dL)   Date Value   04/30/2018 14     BP Readings from Last 3 Encounters:   11/27/21 (!) 149/100   06/12/20 122/86   01/14/20 132/80          (goal 120/80)    All Future Testing planned in [facility]  Lab Frequency Next Occurrence               Patient Active Problem List:     Respiratory insufficiency     Traumatic brain injury ([facility] [address]     PTSD (post-traumatic stress disorder)     Suicidal ideation     Depression     Opioid abuse, in remission ([facility] [address]     Carotid aneurysm, left (HCC)     Depression, major, recurrent ([facility] [address]

## 2022-07-21 ENCOUNTER — TELEMEDICINE (OUTPATIENT)
Dept: FAMILY MEDICINE CLINIC | Age: 32
End: 2022-07-21
Payer: MEDICARE

## 2022-07-21 ENCOUNTER — TELEPHONE (OUTPATIENT)
Dept: FAMILY MEDICINE CLINIC | Age: 32
End: 2022-07-21

## 2022-07-21 DIAGNOSIS — Z76.0 MEDICATION REFILL: ICD-10-CM

## 2022-07-21 DIAGNOSIS — F43.10 PTSD (POST-TRAUMATIC STRESS DISORDER): Primary | ICD-10-CM

## 2022-07-21 DIAGNOSIS — F90.9 ATTENTION DEFICIT HYPERACTIVITY DISORDER (ADHD), UNSPECIFIED ADHD TYPE: ICD-10-CM

## 2022-07-21 PROCEDURE — G8427 DOCREV CUR MEDS BY ELIG CLIN: HCPCS | Performed by: NURSE PRACTITIONER

## 2022-07-21 PROCEDURE — 99213 OFFICE O/P EST LOW 20 MIN: CPT | Performed by: NURSE PRACTITIONER

## 2022-07-21 RX ORDER — DEXTROAMPHETAMINE SACCHARATE, AMPHETAMINE ASPARTATE MONOHYDRATE, DEXTROAMPHETAMINE SULFATE AND AMPHETAMINE SULFATE 5; 5; 5; 5 MG/1; MG/1; MG/1; MG/1
20 CAPSULE, EXTENDED RELEASE ORAL 2 TIMES DAILY
Qty: 60 CAPSULE | Refills: 0 | Status: SHIPPED | OUTPATIENT
Start: 2022-07-21 | End: 2022-08-20

## 2022-07-21 RX ORDER — FLUOCINOLONE ACETONIDE 0.1 MG/ML
SOLUTION TOPICAL
Qty: 60 ML | Refills: 1 | Status: SHIPPED | OUTPATIENT
Start: 2022-07-21

## 2022-07-21 RX ORDER — DEXTROAMPHETAMINE SACCHARATE, AMPHETAMINE ASPARTATE MONOHYDRATE, DEXTROAMPHETAMINE SULFATE AND AMPHETAMINE SULFATE 5; 5; 5; 5 MG/1; MG/1; MG/1; MG/1
20 CAPSULE, EXTENDED RELEASE ORAL EVERY MORNING
Qty: 30 CAPSULE | Refills: 0 | Status: SHIPPED | OUTPATIENT
Start: 2022-09-01 | End: 2022-07-21 | Stop reason: SDUPTHER

## 2022-07-21 RX ORDER — DEXTROAMPHETAMINE SACCHARATE, AMPHETAMINE ASPARTATE MONOHYDRATE, DEXTROAMPHETAMINE SULFATE AND AMPHETAMINE SULFATE 5; 5; 5; 5 MG/1; MG/1; MG/1; MG/1
20 CAPSULE, EXTENDED RELEASE ORAL 2 TIMES DAILY
Qty: 60 CAPSULE | Refills: 0 | Status: SHIPPED | OUTPATIENT
Start: 2022-07-21 | End: 2022-10-04 | Stop reason: SDUPTHER

## 2022-07-21 RX ORDER — GABAPENTIN 600 MG/1
TABLET ORAL
Qty: 90 TABLET | Refills: 2 | Status: SHIPPED | OUTPATIENT
Start: 2022-07-21 | End: 2022-09-23

## 2022-07-21 RX ORDER — SERTRALINE HYDROCHLORIDE 100 MG/1
TABLET, FILM COATED ORAL
Qty: 60 TABLET | Refills: 5 | Status: SHIPPED | OUTPATIENT
Start: 2022-07-21

## 2022-07-21 RX ORDER — DEXTROAMPHETAMINE SACCHARATE, AMPHETAMINE ASPARTATE MONOHYDRATE, DEXTROAMPHETAMINE SULFATE AND AMPHETAMINE SULFATE 5; 5; 5; 5 MG/1; MG/1; MG/1; MG/1
20 CAPSULE, EXTENDED RELEASE ORAL EVERY MORNING
Qty: 30 CAPSULE | Refills: 0 | Status: SHIPPED | OUTPATIENT
Start: 2022-08-01 | End: 2022-07-21 | Stop reason: SDUPTHER

## 2022-07-21 RX ORDER — DEXTROAMPHETAMINE SACCHARATE, AMPHETAMINE ASPARTATE MONOHYDRATE, DEXTROAMPHETAMINE SULFATE AND AMPHETAMINE SULFATE 5; 5; 5; 5 MG/1; MG/1; MG/1; MG/1
20 CAPSULE, EXTENDED RELEASE ORAL 2 TIMES DAILY
Qty: 60 CAPSULE | Refills: 0 | Status: SHIPPED | OUTPATIENT
Start: 2022-07-21 | End: 2022-07-21 | Stop reason: SDUPTHER

## 2022-07-21 ASSESSMENT — ENCOUNTER SYMPTOMS
COUGH: 0
SHORTNESS OF BREATH: 0

## 2022-07-21 NOTE — PROGRESS NOTES
Patient is present to discuss medications  Patient states he was having trouble finding a psychiatrist but was able to schedule with one  States he stopped taking the effexor, states he felt he did not need it

## 2022-07-21 NOTE — TELEPHONE ENCOUNTER
[facility] called stating they received 3 Adderall prescriptions with the same start day. Pharmacy wanted to make sure this was for 3 months. Pharmacy informed it is for 3 months and will correct it.

## 2022-07-21 NOTE — PROGRESS NOTES
Christiano Montenegro (:  1990) is a New patient, here for evaluation of the following:    Assessment & Plan   Below is the assessment and plan developed based on review of pertinent history, physical exam, labs, studies, and medications. 1. PTSD (post-traumatic stress disorder)  2. Attention deficit hyperactivity disorder (ADHD), unspecified ADHD type  -     amphetamine-dextroamphetamine (ADDERALL XR) 20 MG extended release capsule; Take 1 capsule by mouth in the morning and at bedtime for 30 days. , Disp-60 capsule, R-0Normal  -     amphetamine-dextroamphetamine (ADDERALL XR) 20 MG extended release capsule; Take 1 capsule by mouth in the morning and at bedtime for 30 days. , Disp-60 capsule, R-0Normal  -     amphetamine-dextroamphetamine (ADDERALL XR) 20 MG extended release capsule; Take 1 capsule by mouth in the morning and at bedtime for 30 days. , Disp-60 capsule, R-0Normal  3. Medication refill  -     gabapentin (NEURONTIN) 600 MG tablet; take 1 tablet by mouth four times a day, Disp-90 tablet, R-2Normal  -     fluocinolone acetonide (SYNALAR) 0.01 % external solution; apply to affected area twice a day, Disp-60 mL, R-1, Normal  -     sertraline (ZOLOFT) 100 MG tablet; Take 2 tablets at bedtime, Disp-60 tablet, R-5Normal  No follow-ups on file. Subjective   HPI  Abel Schwartz is here for med refills  Has been stable on meds for years   No new concerns  Will come in to office in 3 months for med contract  Uses thc but has medical card in [facility]    Review of Systems   Constitutional:  Negative for chills and fever. Respiratory:  Negative for cough and shortness of breath. Cardiovascular:  Negative for chest pain and leg swelling. Psychiatric/Behavioral:  Negative for dysphoric mood. The patient is not nervous/anxious.          Objective   Patient-Reported Vitals  No data recorded     Physical Exam  [INSTRUCTIONS:  \"[x]\" Indicates a positive item  \"[]\" Indicates a negative item  -- DELETE ALL ITEMS NOT EXAMINED]    Constitutional: [x] Appears well-developed and well-nourished [x] No apparent distress      [] Abnormal -     Mental status: [x] Alert and awake  [x] Oriented to person/place/time [x] Able to follow commands    [] Abnormal -     Eyes:   EOM    [x]  Normal    [] Abnormal -   Sclera  [x]  Normal    [] Abnormal -          Discharge [x]  None visible   [] Abnormal -     HENT: [x] Normocephalic, atraumatic  [] Abnormal -   [x] Mouth/Throat: Mucous membranes are moist    External Ears [x] Normal  [] Abnormal -    Neck: [x] No visualized mass [] Abnormal -     Pulmonary/Chest: [x] Respiratory effort normal   [x] No visualized signs of difficulty breathing or respiratory distress        [] Abnormal -      Musculoskeletal:   [x] Normal gait with no signs of ataxia         [x] Normal range of motion of neck        [] Abnormal -     Neurological:        [x] No Facial Asymmetry (Cranial nerve 7 motor function) (limited exam due to video visit)          [x] No gaze palsy        [] Abnormal -          Skin:        [x] No significant exanthematous lesions or discoloration noted on facial skin         [] Abnormal -            Psychiatric:       [x] Normal Affect [] Abnormal -        [x] No Hallucinations    Other pertinent observable physical exam findings:-         Emerald Mckinnon, was evaluated through a synchronous (real-time) audio-video encounter. The patient (or guardian if applicable) is aware that this is a billable service, which includes applicable co-pays. This Virtual Visit was conducted with patient's (and/or legal guardian's) consent. The visit was conducted pursuant to the emergency declaration under the [address] [place] [address] and the [employer] General Act. Patient identification was verified, and a caregiver was present when appropriate. The patient was located at Home: [address] [place] [address]. Provider [facility] [address] Arthur, APRN - CNP

## 2022-07-22 RX ORDER — GABAPENTIN 600 MG/1
TABLET ORAL
Qty: 90 TABLET | Refills: 0 | OUTPATIENT
Start: 2022-07-22

## 2022-09-04 ENCOUNTER — HOSPITAL ENCOUNTER (EMERGENCY)
Facility: CLINIC | Age: 32
Discharge: HOME | End: 2022-09-04
Attending: EMERGENCY MEDICINE
Payer: MEDICARE

## 2022-09-04 VITALS
SYSTOLIC BLOOD PRESSURE: 167 MMHG | DIASTOLIC BLOOD PRESSURE: 109 MMHG | BODY MASS INDEX: 29.52 KG/M2 | WEIGHT: 230 LBS | RESPIRATION RATE: 17 BRPM | HEART RATE: 99 BPM | HEIGHT: 74 IN | TEMPERATURE: 98.4 F | OXYGEN SATURATION: 96 %

## 2022-09-04 DIAGNOSIS — L02.91 ABSCESS: Primary | ICD-10-CM

## 2022-09-04 PROCEDURE — 99284 EMERGENCY DEPT VISIT MOD MDM: CPT

## 2022-09-04 PROCEDURE — 90471 IMMUNIZATION ADMIN: CPT

## 2022-09-04 PROCEDURE — 6360000002 HC RX W HCPCS

## 2022-09-04 PROCEDURE — 10061 I&D ABSCESS COMP/MULTIPLE: CPT

## 2022-09-04 PROCEDURE — 90715 TDAP VACCINE 7 YRS/> IM: CPT

## 2022-09-04 RX ORDER — DOXYCYCLINE HYCLATE 100 MG
100 TABLET ORAL 2 TIMES DAILY
Qty: 20 TABLET | Refills: 0 | Status: SHIPPED | OUTPATIENT
Start: 2022-09-04 | End: 2022-09-14

## 2022-09-04 RX ORDER — LIDOCAINE HYDROCHLORIDE 10 MG/ML
10 INJECTION, SOLUTION INFILTRATION; PERINEURAL ONCE
Status: DISCONTINUED | OUTPATIENT
Start: 2022-09-04 | End: 2022-09-04 | Stop reason: HOSPADM

## 2022-09-04 RX ADMIN — TETANUS TOXOID, REDUCED DIPHTHERIA TOXOID AND ACELLULAR PERTUSSIS VACCINE, ADSORBED 0.5 ML: 5; 2.5; 8; 8; 2.5 SUSPENSION INTRAMUSCULAR at 12:43

## 2022-09-04 ASSESSMENT — PAIN SCALES - GENERAL: PAINLEVEL_OUTOF10: 7

## 2022-09-04 ASSESSMENT — PAIN - FUNCTIONAL ASSESSMENT: PAIN_FUNCTIONAL_ASSESSMENT: 0-10

## 2022-09-04 NOTE — ED PROVIDER NOTES
Attending Supervisory Note/Shared Visit   I have personally performed a face to face diagnostic evaluation on this patient. I have reviewed the mid-levels findings and agree. History and Exam by me shows an alert and oriented patient seen with extender I agree with the assessment treatment plan and disposition  Patient tolerated the procedure well sign of secondary infection.     (Please note that portions of this note were completed with a voice recognition program.  Efforts were made to edit the dictations but occasionally words are mis-transcribed.)    Wilton Patel MD  Attending Emergency Physician       Wilton Patel MD  09/04/22 1220

## 2022-09-04 NOTE — ED PROVIDER NOTES
[facility] ED  [address] [facility]  Phone: [phone]      eMERGENCY dEPARTMENT eNCOUnter      Pt Name: Carrington Watson  MRN: [id number]  [place] 1990  Date of evaluation: 9/4/22      CHIEF COMPLAINT:  Chief Complaint   Patient presents with    Abscess     Had it for 5 years, in the last couple days it has gotten bigger and swollen, popped a few days ago     Caden Olson is a 34 y.o. male who presents with abscess to left buttocks. He states that he has had the abscess for 5 years with intermittent \"flare ups\". He states that last night he was squeezing the area and was able to express some drainage. He does report some pain to the area and describes it as more of a tenderness. He denies any complaints of fever, chills, nausea, vomiting. Tetanus is unknown. Nursing Notes were reviewed. REVIEW OF SYSTEMS       Constitutional: Denies fever or chills  Eyes: No visual changes. Neck: No neck pain. Respiratory: Denies recent shortness of breath. Cardiac:  Denies recent chest pain. GI: Denies nausea, vomiting. Musculoskeletal: Denies focal weakness. Neurologic: Denies headache   Skin: Reports abscess to left buttocks. Negative in 10 essential Systems except as mentioned above and in the HPI. PAST MEDICAL HISTORY   PMH:  has a past medical history of Bipolar 2 disorder ([address] Brain aneurysm, Depression, H/O cervical fracture, IBS (irritable bowel syndrome), Spleen absent, and Traumatic brain injury ([address] Surgical History:  has a past surgical history that includes Splenectomy; Elbow surgery; and Knee cartilage surgery. Social History:  reports that he has quit smoking. His smoking use included cigarettes and e-cigarettes. He has never used smokeless tobacco. He reports current drug use. He reports that he does not drink alcohol.   Family History: None  Psychiatric History: None    Allergies:is allergic to ceclor [cefaclor], morphine, and vermox [mebendazole]. PHYSICAL EXAM     INITIAL VITALS: BP (!) 167/109   Pulse 99   Temp 98.4 °F (36.9 °C)   Resp 17   Ht 6' 2\" (1.88 m)   Wt 104.3 kg (230 lb)   SpO2 96%   BMI 29.53 kg/m²   Constitutional:  Well developed   Eyes:  Pupils equal./round  HENT:  Atraumatic, external ears normal, nose normal  Respiratory:  Clear to auscultation bilaterally with good air exchange  Cardiovascular:  RRR with normal S1 and S2  Gastrointestinal/Abdomen:  Soft, NT.  BS present. Musculoskeletal:  Normal to inspection  Back:    Normal to inspection. Integument:  2cm abscess area with fluctuance and 4x3 cm area of induration to left buttocks with surrounding erythema  Neurologic:  Alert & age appropriate mentation/interaction, no focal deficits noted     DIAGNOSTIC RESULTS     EKG: All EKG's are interpreted by the Emergency Department Physician who either signs or Co-signs this chart in the absence of a cardiologist.  Not indicated    RADIOLOGY:   Reviewed the radiologist:  No orders to display     Not indicated      LABS:  Labs Reviewed - No data to display  Not indicated    EMERGENCY DEPARTMENT COURSE:     1st dose abx given here. Incision and Drainage Procedure Note    Indication: Abscess    Procedure: The patient was positioned appropriately and the skin over the incision site was draped in a sterile fashion. Local anesthesia was obtained by infiltration using 1% Lidocaine without epinephrine. An incision was then made over the greatest area of fluctuance and approximately 1 cc of thick, foul smelling, purulent, and bloody material was expressed. Loculations were broken up using forceps and more of the material was able to be expressed. The drainage cavity was then irrigated, packed with sterile gauze, and dressed with a bandage. The patients tetanus status was updated with a tetanus booster. The patient tolerated the procedure well.     Complications: None    Presents with cutaneous abscess requiring incision and drainage. Patient verbalized consent for treatment. Patient was given oral antibiotics for bacterial coverage and both verbal and written instructions to follow up to ED or Family Doctor in 2-3 days for recheck and/or packing change. Was instructed to return for any worsening of the abscess or surrounding cellulitis. There are signs of infection. The sensation is intact. The patient was instructed to follow up in 2-3 days with their family doctor for a wound re-check and packing removal.  We also discussed returning to the Emergency Department immediately if new or worsening symptoms occur. We have discussed the symptoms which are most concerning (e.g., increasing pain, fever, drainage from the wound or other signs of infection, numbness, weakness, color change or coldness to the extremity) that necessitate immediate return. The patient was advised to keep the wound clean and dry, they may apply antibiotic ointment to the wound. The patient understands that at this time there is no evidence for a more malignant underlying process, but the patient also understands that early in the process of an illness or injury, an emergency department workup can be falsely reassuring. Routine discharge counseling was given, and the patient understands that worsening, changing or persistent symptoms should prompt an immediate call or follow up with their primary physician or return to the emergency department. The importance of appropriate follow up was also discussed. I have reviewed the disposition diagnosis with the patient and or their family/guardian. I have answered their questions and given discharge instructions. They voiced understanding of these instructions and did not have any further questions or complaints.      Orders Placed This Encounter   Medications    lidocaine 1 % injection 10 mL    doxycycline hyclate (VIBRA-TABS) 100 MG tablet     Sig: Take 1 tablet by mouth 2 times daily for 10 days Dispense:  20 tablet     Refill:  0    tetanus-diphth-acell pertussis (BOOSTRIX) injection 0.5 mL       CONSULTS:  None      FINAL IMPRESSION      1.  Abscess          DISPOSITION/PLAN:  DISPOSITION Decision To Discharge 09/04/2022 12:37:52 PM        PATIENT REFERRED TO:  PHOEBE Hicks - CNP  [address] [id number]    Call in 2 days  For wound re-check    [facility] ED  [address]  [phone]    As needed    DISCHARGE MEDICATIONS:  New Prescriptions    DOXYCYCLINE HYCLATE (VIBRA-TABS) 100 MG TABLET    Take 1 tablet by mouth 2 times daily for 10 days       (Please note that portions of this note were completed with a voice recognition program.  Efforts were made to edit the dictations but occasionally words are mis-transcribed.)    Cuong Milian, [address] [facility] [facility], APRN - CNP  09/04/22 8143

## 2022-09-20 DIAGNOSIS — Z76.0 MEDICATION REFILL: ICD-10-CM

## 2022-09-20 NOTE — TELEPHONE ENCOUNTER
Last visit: 7/21/22  Last Med refill: 7/21/22  Does patient have enough medication for 72 hours: unsure if patient is due.  Script was for #90 but patient takes 4 times daily    Next Visit Date:  Future Appointments   Date Time Provider Nicolas Aden   10/21/2022 11:00 AM Shruti Rosas, APRN - CNP [facility] [address] Maintenance   Topic Date Due    Meningococcal (ACWY) vaccine (1 - Risk start 2-23 months series) Never done    Hib vaccine (1 of 1 - Risk 1-dose series) Never done    Meningococcal B vaccine (1 of 4 - Increased Risk Bexsero 2-dose series) Never done    Varicella vaccine (2 of 2 - 2-dose childhood series) 11/18/2000    HIV screen  Never done    Hepatitis C screen  Never done    Pneumococcal 0-64 years Vaccine (2 - PCV) 02/25/2020    Flu vaccine (1) 09/01/2022    COVID-19 Vaccine (1) 07/21/2023 (Originally 1990)    Depression Monitoring  05/26/2023    DTaP/Tdap/Td vaccine (3 - Td or Tdap) 09/04/2032    Hepatitis A vaccine  Aged Out    Hepatitis B vaccine  Aged Out       No results found for: LABA1C          ( goal A1C is < 7)   No results found for: LABMICR  LDL Calculated (mg/dL)   Date Value   09/21/2016 105       (goal LDL is <100)   AST (U/L)   Date Value   04/30/2018 35     ALT (U/L)   Date Value   04/30/2018 42 (H)     BUN (mg/dL)   Date Value   04/30/2018 14     BP Readings from Last 3 Encounters:   09/04/22 (!) 167/109   11/27/21 (!) 149/100   06/12/20 122/86          (goal 120/80)    All Future Testing planned in [facility]  Lab Frequency Next Occurrence               Patient Active Problem List:     Respiratory insufficiency     Traumatic brain injury ([facility] [address]     PTSD (post-traumatic stress disorder)     Suicidal ideation     Depression     Opioid abuse, in remission ([facility] [address]     Carotid aneurysm, left (HCC)     Depression, major, recurrent ([facility] [address]

## 2022-09-23 RX ORDER — GABAPENTIN 600 MG/1
TABLET ORAL
Qty: 120 TABLET | Refills: 1 | Status: SHIPPED | OUTPATIENT
Start: 2022-09-23 | End: 2022-11-15 | Stop reason: SDUPTHER

## 2022-10-04 ENCOUNTER — TELEPHONE (OUTPATIENT)
Dept: FAMILY MEDICINE CLINIC | Age: 32
End: 2022-10-04

## 2022-10-04 DIAGNOSIS — F90.9 ATTENTION DEFICIT HYPERACTIVITY DISORDER (ADHD), UNSPECIFIED ADHD TYPE: ICD-10-CM

## 2022-10-04 RX ORDER — DEXTROAMPHETAMINE SACCHARATE, AMPHETAMINE ASPARTATE MONOHYDRATE, DEXTROAMPHETAMINE SULFATE AND AMPHETAMINE SULFATE 5; 5; 5; 5 MG/1; MG/1; MG/1; MG/1
20 CAPSULE, EXTENDED RELEASE ORAL 2 TIMES DAILY
Qty: 60 CAPSULE | Refills: 0 | Status: SHIPPED | OUTPATIENT
Start: 2022-10-04 | End: 2022-11-02 | Stop reason: SDUPTHER

## 2022-10-04 NOTE — TELEPHONE ENCOUNTER
Pt is calling due to Adderall script dated 09/01/22. It list as 1 capsule in the morning. The scripts from 07/21/2022 states 1 in the am and 1 at bedtime.    The pharmacy asked that there be a little documentation on script if it is being changed to 2 a day    He has been taking 2 a day  Script was filled   7/22/2022  60 caps  8/22/2022  60 caps  9/20/2022  30 caps

## 2022-11-02 ENCOUNTER — TELEPHONE (OUTPATIENT)
Dept: FAMILY MEDICINE CLINIC | Age: 32
End: 2022-11-02

## 2022-11-02 DIAGNOSIS — F90.9 ATTENTION DEFICIT HYPERACTIVITY DISORDER (ADHD), UNSPECIFIED ADHD TYPE: ICD-10-CM

## 2022-11-02 RX ORDER — DEXTROAMPHETAMINE SACCHARATE, AMPHETAMINE ASPARTATE MONOHYDRATE, DEXTROAMPHETAMINE SULFATE AND AMPHETAMINE SULFATE 5; 5; 5; 5 MG/1; MG/1; MG/1; MG/1
20 CAPSULE, EXTENDED RELEASE ORAL 2 TIMES DAILY
Qty: 60 CAPSULE | Refills: 0 | Status: SHIPPED | OUTPATIENT
Start: 2022-11-02 | End: 2022-12-02

## 2022-11-02 NOTE — TELEPHONE ENCOUNTER
Last visit: 7/21/22  Last Med refill: PICKED UP ON 10/08/2022  FOR QUANTITY OF 52  Does patient have enough medication for 72 hours: No: patient states has none  he states did not receive full 60 tabs.   QUANTITY DISPENSED WAS 52    Next Visit Date:  Future Appointments   Date Time Provider Nicolas Aden   11/15/2022 11:45 AM Júnior Frank, APRN - CNP [facility] [address] Maintenance   Topic Date Due    Meningococcal (ACWY) vaccine (1 - Risk start 2-23 months series) Never done    Hib vaccine (1 of 1 - Risk 1-dose series) Never done    Meningococcal B vaccine (1 of 4 - Increased Risk Bexsero 2-dose series) Never done    Varicella vaccine (2 of 2 - 2-dose childhood series) 11/18/2000    HIV screen  Never done    Hepatitis C screen  Never done    Flu vaccine (1) 08/01/2022    COVID-19 Vaccine (1) 07/21/2023 (Originally 1990)    Depression Monitoring  05/26/2023    DTaP/Tdap/Td vaccine (3 - Td or Tdap) 09/04/2032    Pneumococcal 0-64 years Vaccine  Completed    Hepatitis A vaccine  Aged Out       No results found for: LABA1C          ( goal A1C is < 7)   No results found for: LABMICR  LDL Calculated (mg/dL)   Date Value   09/21/2016 105       (goal LDL is <100)   AST (U/L)   Date Value   04/30/2018 35     ALT (U/L)   Date Value   04/30/2018 42 (H)     BUN (mg/dL)   Date Value   04/30/2018 14     BP Readings from Last 3 Encounters:   09/04/22 (!) 167/109   11/27/21 (!) 149/100   06/12/20 122/86          (goal 120/80)    All Future Testing planned in [facility]  Lab Frequency Next Occurrence               Patient Active Problem List:     Respiratory insufficiency     Traumatic brain injury     PTSD (post-traumatic stress disorder)     Suicidal ideation     Depression     Opioid abuse, in remission ([facility] [address]     Carotid aneurysm, left (HCC)     Depression, major, recurrent ([facility] [address]

## 2022-11-02 NOTE — TELEPHONE ENCOUNTER
I tried to call Aury Randall  (mail box full) to let him know his Adderall went to   [address], [address] [place] [address] Drive, [address] [facility] [address]   Phone:  [phone]  Fax:  [phone]    I forgot to change pharmacy to [facility] [address].  I have added the Sir[facility] to the list

## 2022-11-04 NOTE — TELEPHONE ENCOUNTER
Patient called back and was informed. States he may stick with [facility] pharmacy. States he will let us know.

## 2022-11-15 ENCOUNTER — OFFICE VISIT (OUTPATIENT)
Dept: FAMILY MEDICINE CLINIC | Age: 32
End: 2022-11-15
Payer: MEDICARE

## 2022-11-15 VITALS
HEART RATE: 87 BPM | SYSTOLIC BLOOD PRESSURE: 130 MMHG | WEIGHT: 241 LBS | DIASTOLIC BLOOD PRESSURE: 80 MMHG | OXYGEN SATURATION: 95 % | BODY MASS INDEX: 30.94 KG/M2

## 2022-11-15 DIAGNOSIS — Z76.0 MEDICATION REFILL: ICD-10-CM

## 2022-11-15 DIAGNOSIS — F90.9 ATTENTION DEFICIT HYPERACTIVITY DISORDER (ADHD), UNSPECIFIED ADHD TYPE: ICD-10-CM

## 2022-11-15 PROCEDURE — G8484 FLU IMMUNIZE NO ADMIN: HCPCS | Performed by: NURSE PRACTITIONER

## 2022-11-15 PROCEDURE — 99213 OFFICE O/P EST LOW 20 MIN: CPT | Performed by: NURSE PRACTITIONER

## 2022-11-15 PROCEDURE — G8427 DOCREV CUR MEDS BY ELIG CLIN: HCPCS | Performed by: NURSE PRACTITIONER

## 2022-11-15 PROCEDURE — G8417 CALC BMI ABV UP PARAM F/U: HCPCS | Performed by: NURSE PRACTITIONER

## 2022-11-15 PROCEDURE — 1036F TOBACCO NON-USER: CPT | Performed by: NURSE PRACTITIONER

## 2022-11-15 RX ORDER — DEXTROAMPHETAMINE SACCHARATE, AMPHETAMINE ASPARTATE MONOHYDRATE, DEXTROAMPHETAMINE SULFATE AND AMPHETAMINE SULFATE 5; 5; 5; 5 MG/1; MG/1; MG/1; MG/1
20 CAPSULE, EXTENDED RELEASE ORAL 2 TIMES DAILY
Qty: 60 CAPSULE | Refills: 0 | Status: SHIPPED | OUTPATIENT
Start: 2022-12-15 | End: 2023-01-13

## 2022-11-15 RX ORDER — DEXTROAMPHETAMINE SACCHARATE, AMPHETAMINE ASPARTATE MONOHYDRATE, DEXTROAMPHETAMINE SULFATE AND AMPHETAMINE SULFATE 5; 5; 5; 5 MG/1; MG/1; MG/1; MG/1
20 CAPSULE, EXTENDED RELEASE ORAL 2 TIMES DAILY
Qty: 60 CAPSULE | Refills: 0 | Status: SHIPPED | OUTPATIENT
Start: 2022-11-15 | End: 2022-12-15

## 2022-11-15 RX ORDER — DEXTROAMPHETAMINE SACCHARATE, AMPHETAMINE ASPARTATE MONOHYDRATE, DEXTROAMPHETAMINE SULFATE AND AMPHETAMINE SULFATE 5; 5; 5; 5 MG/1; MG/1; MG/1; MG/1
20 CAPSULE, EXTENDED RELEASE ORAL 2 TIMES DAILY
Qty: 60 CAPSULE | Refills: 0 | Status: SHIPPED | OUTPATIENT
Start: 2023-01-14 | End: 2023-02-12

## 2022-11-15 RX ORDER — DEXTROAMPHETAMINE SACCHARATE, AMPHETAMINE ASPARTATE MONOHYDRATE, DEXTROAMPHETAMINE SULFATE AND AMPHETAMINE SULFATE 5; 5; 5; 5 MG/1; MG/1; MG/1; MG/1
20 CAPSULE, EXTENDED RELEASE ORAL 2 TIMES DAILY
Qty: 60 CAPSULE | Refills: 0 | Status: CANCELLED | OUTPATIENT
Start: 2022-11-15 | End: 2022-12-15

## 2022-11-15 RX ORDER — GABAPENTIN 600 MG/1
TABLET ORAL
Qty: 120 TABLET | Refills: 2 | Status: SHIPPED | OUTPATIENT
Start: 2022-11-15 | End: 2023-01-07

## 2022-11-15 RX ORDER — DEXTROAMPHETAMINE SACCHARATE, AMPHETAMINE ASPARTATE MONOHYDRATE, DEXTROAMPHETAMINE SULFATE AND AMPHETAMINE SULFATE 5; 5; 5; 5 MG/1; MG/1; MG/1; MG/1
20 CAPSULE, EXTENDED RELEASE ORAL 2 TIMES DAILY
Qty: 60 CAPSULE | Refills: 0 | Status: CANCELLED | OUTPATIENT
Start: 2022-12-15 | End: 2023-01-14

## 2022-11-15 RX ORDER — DEXTROAMPHETAMINE SACCHARATE, AMPHETAMINE ASPARTATE MONOHYDRATE, DEXTROAMPHETAMINE SULFATE AND AMPHETAMINE SULFATE 5; 5; 5; 5 MG/1; MG/1; MG/1; MG/1
20 CAPSULE, EXTENDED RELEASE ORAL 2 TIMES DAILY
Qty: 60 CAPSULE | Refills: 0 | Status: CANCELLED | OUTPATIENT
Start: 2023-01-13 | End: 2023-02-12

## 2022-11-15 RX ORDER — SERTRALINE HYDROCHLORIDE 100 MG/1
TABLET, FILM COATED ORAL
Qty: 60 TABLET | Refills: 5 | Status: SHIPPED | OUTPATIENT
Start: 2022-11-15

## 2022-11-15 ASSESSMENT — ENCOUNTER SYMPTOMS
COUGH: 0
SHORTNESS OF BREATH: 0

## 2022-11-15 NOTE — PROGRESS NOTES
Patient is present for 3 month f/u for ADHD  Patient states he has not had his adderall filled in a few weeks due to it being on backorder

## 2022-11-15 NOTE — PROGRESS NOTES
Kaushik Ortiz (:  1990) is a 34 y.o. male,Established patient, here for evaluation of the following chief complaint(s):  ADHD and 3 Month Follow-Up      ASSESSMENT/PLAN:  1. Attention deficit hyperactivity disorder (ADHD), unspecified ADHD type  The following orders have not been finalized:  Stable continue medication    -     amphetamine-dextroamphetamine (ADDERALL XR) 20 MG extended release capsule  -     amphetamine-dextroamphetamine (ADDERALL XR) 20 MG extended release capsule  -     amphetamine-dextroamphetamine (ADDERALL XR) 20 MG extended release capsule  2. Medication refill  The following orders have not been finalized:  -     gabapentin (NEURONTIN) 600 MG tablet      No follow-ups on file. Subjective   SUBJECTIVE/OBJECTIVE:  ADHD  This is a chronic problem. Pertinent negatives include no chest pain, chills, coughing or fever. Review of Systems   Constitutional:  Negative for chills and fever. Respiratory:  Negative for cough and shortness of breath. Cardiovascular:  Negative for chest pain and leg swelling. Objective   Vitals:    11/15/22 1149   BP: 130/80   Pulse: 87   SpO2: 95%     Wt Readings from Last 3 Encounters:   11/15/22 241 lb (109.3 kg)   22 230 lb (104.3 kg)   20 217 lb 3.2 oz (98.5 kg)       Physical Exam  Constitutional:       General: He is not in acute distress. Appearance: He is well-developed. HENT:      Head: Normocephalic. Right Ear: External ear normal.      Left Ear: External ear normal.   Cardiovascular:      Rate and Rhythm: Normal rate and regular rhythm. Heart sounds: Normal heart sounds. Pulmonary:      Effort: Pulmonary effort is normal.      Breath sounds: Normal breath sounds. Musculoskeletal:         General: Normal range of motion. Skin:     General: Skin is warm and dry. Neurological:      Mental Status: He is alert and oriented to person, place, and time.           An electronic signature was used to authenticate this note.     --Kip Motley, APRN - CNP

## 2022-11-18 RX ORDER — OMEPRAZOLE 20 MG/1
CAPSULE, DELAYED RELEASE ORAL
Qty: 30 CAPSULE | Refills: 5 | Status: SHIPPED | OUTPATIENT
Start: 2022-11-18

## 2022-11-18 NOTE — TELEPHONE ENCOUNTER
Last visit: 11/15/22  Last Med refill: 5/26/22  Does patient have enough medication for 72 hours: Yes    Next Visit Date:  Future Appointments   Date Time Provider Nicolas Aden   2/17/2023 11:30 AM Rutherford Aschoff, APRN - CNP [facility] [address] Maintenance   Topic Date Due    Meningococcal (ACWY) vaccine (1 - Risk start 2-23 months series) Never done    Hib vaccine (1 of 1 - Risk 1-dose series) Never done    Meningococcal B vaccine (1 of 4 - Increased Risk Bexsero 2-dose series) Never done    Varicella vaccine (2 of 2 - 2-dose childhood series) 11/18/2000    HIV screen  Never done    Hepatitis C screen  Never done    COVID-19 Vaccine (1) 07/21/2023 (Originally 1990)    Flu vaccine (1) 11/15/2023 (Originally 8/1/2022)    Depression Monitoring  05/26/2023    DTaP/Tdap/Td vaccine (3 - Td or Tdap) 09/04/2032    Pneumococcal 0-64 years Vaccine  Completed    Hepatitis A vaccine  Aged Out       No results found for: LABA1C          ( goal A1C is < 7)   No results found for: LABMICR  LDL Calculated (mg/dL)   Date Value   09/21/2016 105       (goal LDL is <100)   AST (U/L)   Date Value   04/30/2018 35     ALT (U/L)   Date Value   04/30/2018 42 (H)     BUN (mg/dL)   Date Value   04/30/2018 14     BP Readings from Last 3 Encounters:   11/15/22 130/80   09/04/22 (!) 167/109   11/27/21 (!) 149/100          (goal 120/80)    All Future Testing planned in [facility]  Lab Frequency Next Occurrence               Patient Active Problem List:     Respiratory insufficiency     Traumatic brain injury     PTSD (post-traumatic stress disorder)     Suicidal ideation     Depression     Opioid abuse, in remission ([facility] [address]     Carotid aneurysm, left (HCC)     Depression, major, recurrent ([facility] [address]

## 2022-12-20 ENCOUNTER — TELEPHONE (OUTPATIENT)
Dept: FAMILY MEDICINE CLINIC | Age: 32
End: 2022-12-20

## 2022-12-20 DIAGNOSIS — F90.9 ATTENTION DEFICIT HYPERACTIVITY DISORDER (ADHD), UNSPECIFIED ADHD TYPE: Primary | ICD-10-CM

## 2022-12-20 NOTE — TELEPHONE ENCOUNTER
Pt called stating he is out of his Adderall, pharmacy told him they are having a hard time getting the XR in stock and told pt if he got the non XR it would be easier for them to fill due to having this in stock, pt states he is okay with switching, stated he was wondering why he took the XR in the evening anyway. .     Pt was advised that the non XR may wear off quicker than the XR

## 2022-12-21 RX ORDER — DEXTROAMPHETAMINE SACCHARATE, AMPHETAMINE ASPARTATE, DEXTROAMPHETAMINE SULFATE AND AMPHETAMINE SULFATE 5; 5; 5; 5 MG/1; MG/1; MG/1; MG/1
20 TABLET ORAL 2 TIMES DAILY
Qty: 60 TABLET | Refills: 0 | Status: SHIPPED | OUTPATIENT
Start: 2022-12-21 | End: 2023-01-20

## 2023-01-17 DIAGNOSIS — F90.9 ATTENTION DEFICIT HYPERACTIVITY DISORDER (ADHD), UNSPECIFIED ADHD TYPE: ICD-10-CM

## 2023-01-18 RX ORDER — DEXTROAMPHETAMINE SACCHARATE, AMPHETAMINE ASPARTATE, DEXTROAMPHETAMINE SULFATE AND AMPHETAMINE SULFATE 5; 5; 5; 5 MG/1; MG/1; MG/1; MG/1
TABLET ORAL
Qty: 60 TABLET | OUTPATIENT
Start: 2023-01-18

## 2023-02-23 ENCOUNTER — TELEMEDICINE (OUTPATIENT)
Dept: FAMILY MEDICINE CLINIC | Age: 33
End: 2023-02-23
Payer: MEDICAID

## 2023-02-23 DIAGNOSIS — F90.2 ATTENTION DEFICIT HYPERACTIVITY DISORDER (ADHD), COMBINED TYPE: Primary | ICD-10-CM

## 2023-02-23 DIAGNOSIS — Z76.0 MEDICATION REFILL: ICD-10-CM

## 2023-02-23 PROCEDURE — 99213 OFFICE O/P EST LOW 20 MIN: CPT | Performed by: NURSE PRACTITIONER

## 2023-02-23 RX ORDER — DEXTROAMPHETAMINE SACCHARATE, AMPHETAMINE ASPARTATE MONOHYDRATE, DEXTROAMPHETAMINE SULFATE AND AMPHETAMINE SULFATE 5; 5; 5; 5 MG/1; MG/1; MG/1; MG/1
20 CAPSULE, EXTENDED RELEASE ORAL DAILY
Qty: 30 CAPSULE | Refills: 0 | Status: SHIPPED | OUTPATIENT
Start: 2023-02-23 | End: 2023-03-25

## 2023-02-23 RX ORDER — DEXTROAMPHETAMINE SACCHARATE, AMPHETAMINE ASPARTATE MONOHYDRATE, DEXTROAMPHETAMINE SULFATE AND AMPHETAMINE SULFATE 5; 5; 5; 5 MG/1; MG/1; MG/1; MG/1
20 CAPSULE, EXTENDED RELEASE ORAL DAILY
Qty: 30 CAPSULE | Refills: 0 | Status: SHIPPED | OUTPATIENT
Start: 2023-04-24 | End: 2023-05-24

## 2023-02-23 RX ORDER — DEXTROAMPHETAMINE SACCHARATE, AMPHETAMINE ASPARTATE MONOHYDRATE, DEXTROAMPHETAMINE SULFATE AND AMPHETAMINE SULFATE 5; 5; 5; 5 MG/1; MG/1; MG/1; MG/1
20 CAPSULE, EXTENDED RELEASE ORAL DAILY
Qty: 30 CAPSULE | Refills: 0 | Status: SHIPPED | OUTPATIENT
Start: 2023-03-25 | End: 2023-04-24

## 2023-02-23 RX ORDER — GABAPENTIN 600 MG/1
TABLET ORAL
Qty: 120 TABLET | Refills: 2 | Status: SHIPPED | OUTPATIENT
Start: 2023-03-13 | End: 2023-03-23

## 2023-02-23 SDOH — ECONOMIC STABILITY: FOOD INSECURITY: WITHIN THE PAST 12 MONTHS, THE FOOD YOU BOUGHT JUST DIDN'T LAST AND YOU DIDN'T HAVE MONEY TO GET MORE.: NEVER TRUE

## 2023-02-23 SDOH — ECONOMIC STABILITY: INCOME INSECURITY: HOW HARD IS IT FOR YOU TO PAY FOR THE VERY BASICS LIKE FOOD, HOUSING, MEDICAL CARE, AND HEATING?: NOT HARD AT ALL

## 2023-02-23 SDOH — ECONOMIC STABILITY: HOUSING INSECURITY
IN THE LAST 12 MONTHS, WAS THERE A TIME WHEN YOU DID NOT HAVE A STEADY PLACE TO SLEEP OR SLEPT IN A SHELTER (INCLUDING NOW)?: NO

## 2023-02-23 SDOH — ECONOMIC STABILITY: FOOD INSECURITY: WITHIN THE PAST 12 MONTHS, YOU WORRIED THAT YOUR FOOD WOULD RUN OUT BEFORE YOU GOT MONEY TO BUY MORE.: NEVER TRUE

## 2023-02-23 ASSESSMENT — PATIENT HEALTH QUESTIONNAIRE - PHQ9
1. LITTLE INTEREST OR PLEASURE IN DOING THINGS: 0
SUM OF ALL RESPONSES TO PHQ9 QUESTIONS 1 & 2: 0
9. THOUGHTS THAT YOU WOULD BE BETTER OFF DEAD, OR OF HURTING YOURSELF: 0
SUM OF ALL RESPONSES TO PHQ QUESTIONS 1-9: 0
10. IF YOU CHECKED OFF ANY PROBLEMS, HOW DIFFICULT HAVE THESE PROBLEMS MADE IT FOR YOU TO DO YOUR WORK, TAKE CARE OF THINGS AT HOME, OR GET ALONG WITH OTHER PEOPLE: 0
2. FEELING DOWN, DEPRESSED OR HOPELESS: 0
6. FEELING BAD ABOUT YOURSELF - OR THAT YOU ARE A FAILURE OR HAVE LET YOURSELF OR YOUR FAMILY DOWN: 0
8. MOVING OR SPEAKING SO SLOWLY THAT OTHER PEOPLE COULD HAVE NOTICED. OR THE OPPOSITE, BEING SO FIGETY OR RESTLESS THAT YOU HAVE BEEN MOVING AROUND A LOT MORE THAN USUAL: 0
5. POOR APPETITE OR OVEREATING: 0
7. TROUBLE CONCENTRATING ON THINGS, SUCH AS READING THE NEWSPAPER OR WATCHING TELEVISION: 0
SUM OF ALL RESPONSES TO PHQ QUESTIONS 1-9: 0
3. TROUBLE FALLING OR STAYING ASLEEP: 0
4. FEELING TIRED OR HAVING LITTLE ENERGY: 0

## 2023-02-23 ASSESSMENT — ENCOUNTER SYMPTOMS
COUGH: 0
SHORTNESS OF BREATH: 0

## 2023-02-23 NOTE — PROGRESS NOTES
Teresa Nazario (:  1990) is a Established patient, here for evaluation of the following:    Assessment & Plan   Below is the assessment and plan developed based on review of pertinent history, physical exam, labs, studies, and medications. 1. Attention deficit hyperactivity disorder (ADHD), combined type  -     amphetamine-dextroamphetamine (ADDERALL XR) 20 MG extended release capsule; Take 1 capsule by mouth daily for 30 days. Max Daily Amount: 20 mg, Disp-30 capsule, R-0Normal  -     amphetamine-dextroamphetamine (ADDERALL XR) 20 MG extended release capsule; Take 1 capsule by mouth daily for 30 days. Max Daily Amount: 20 mg, Disp-30 capsule, R-0Normal  -     amphetamine-dextroamphetamine (ADDERALL XR) 20 MG extended release capsule; Take 1 capsule by mouth daily for 30 days. Max Daily Amount: 20 mg, Disp-30 capsule, R-0Normal  2. Medication refill  -     gabapentin (NEURONTIN) 600 MG tablet; take 1 tablet by mouth four times a day, Disp-120 tablet, R-2Normal  Return in 3 months (on 2023). Subjective   ADHD  Pertinent negatives include no chest pain, chills, coughing or fever. Dania is not sure he wants to go back on naltrexone at this time. Adhd- adderall working well  Stable on all meds  No new conerns. Review of Systems   Constitutional:  Negative for chills and fever. Respiratory:  Negative for cough and shortness of breath. Cardiovascular:  Negative for chest pain and leg swelling.         Objective   Patient-Reported Vitals  No data recorded     Physical Exam  [INSTRUCTIONS:  \"[x]\" Indicates a positive item  \"[]\" Indicates a negative item  -- DELETE ALL ITEMS NOT EXAMINED]    Constitutional: [x] Appears well-developed and well-nourished [x] No apparent distress      [] Abnormal -     Mental status: [x] Alert and awake  [x] Oriented to person/place/time [x] Able to follow commands    [] Abnormal -     Eyes:   EOM    [x]  Normal    [] Abnormal -   Sclera  [x]  Normal    [] Abnormal -          Discharge [x]  None visible   [] Abnormal -     HENT: [x] Normocephalic, atraumatic  [] Abnormal -   [x] Mouth/Throat: Mucous membranes are moist    External Ears [x] Normal  [] Abnormal -    Neck: [x] No visualized mass [] Abnormal -     Pulmonary/Chest: [x] Respiratory effort normal   [x] No visualized signs of difficulty breathing or respiratory distress        [] Abnormal -      Musculoskeletal:   [x] Normal gait with no signs of ataxia         [x] Normal range of motion of neck        [] Abnormal -     Neurological:        [x] No Facial Asymmetry (Cranial nerve 7 motor function) (limited exam due to video visit)          [x] No gaze palsy        [] Abnormal -          Skin:        [x] No significant exanthematous lesions or discoloration noted on facial skin         [] Abnormal -            Psychiatric:       [x] Normal Affect [] Abnormal -        [x] No Hallucinations    Other pertinent observable physical exam findings:-      Lauren Gonzalez, was evaluated through a synchronous (real-time) audio-video encounter. The patient (or guardian if applicable) is aware that this is a billable service, which includes applicable co-pays. This Virtual Visit was conducted with patient's (and/or legal guardian's) consent. The visit was conducted pursuant to the emergency declaration under the [facility], [phone] waiver authority and the [employer] General Act. Patient identification was verified, and a caregiver was present when appropriate.    The patient was located at Home: [facility]  Provider was located at Home ([facility] 2): [place]         --PHOEBE Delgadillo - CNP

## 2023-02-23 NOTE — PROGRESS NOTES
Patient is present for med refills for ADHD    Patient states his mom wants him to go back on vivitrol

## 2023-03-23 ENCOUNTER — TELEPHONE (OUTPATIENT)
Dept: FAMILY MEDICINE CLINIC | Age: 33
End: 2023-03-23

## 2023-03-23 NOTE — TELEPHONE ENCOUNTER
Patient called in regards to Adderall XR 20 mg  Take 1 capsule by mouth daily for 30 days. Max Daily Amount: 20 mg         He states has been taking Adderall 20 mg  Take 1 tablet by mouth 2 times daily for 30 days. Max Daily Amount: 40 mg  due to shortage. He states makes no difference which he gets.  The pharmacy is currently out and a shipment is due 03/24/23

## 2023-03-24 DIAGNOSIS — F90.2 ATTENTION DEFICIT HYPERACTIVITY DISORDER (ADHD), COMBINED TYPE: ICD-10-CM

## 2023-03-24 RX ORDER — DEXTROAMPHETAMINE SACCHARATE, AMPHETAMINE ASPARTATE MONOHYDRATE, DEXTROAMPHETAMINE SULFATE AND AMPHETAMINE SULFATE 5; 5; 5; 5 MG/1; MG/1; MG/1; MG/1
20 CAPSULE, EXTENDED RELEASE ORAL DAILY
Qty: 60 CAPSULE | Refills: 0
Start: 2023-03-25 | End: 2023-04-24

## 2023-05-05 ENCOUNTER — TELEPHONE (OUTPATIENT)
Dept: FAMILY MEDICINE CLINIC | Age: 33
End: 2023-05-05

## 2023-05-05 DIAGNOSIS — F90.9 ATTENTION DEFICIT HYPERACTIVITY DISORDER (ADHD), UNSPECIFIED ADHD TYPE: ICD-10-CM

## 2023-05-05 RX ORDER — DEXTROAMPHETAMINE SACCHARATE, AMPHETAMINE ASPARTATE, DEXTROAMPHETAMINE SULFATE AND AMPHETAMINE SULFATE 5; 5; 5; 5 MG/1; MG/1; MG/1; MG/1
20 TABLET ORAL 2 TIMES DAILY
Qty: 60 TABLET | Refills: 0 | Status: SHIPPED | OUTPATIENT
Start: 2023-05-05 | End: 2023-06-04

## 2023-05-05 NOTE — TELEPHONE ENCOUNTER
Patient contacted office. States his adderall was sent in incorrectly. He stated he takes is TWICE A DAY.        Please review and resubmit      [facility]

## 2023-05-14 DIAGNOSIS — Z76.0 MEDICATION REFILL: ICD-10-CM

## 2023-05-15 RX ORDER — SERTRALINE HYDROCHLORIDE 100 MG/1
TABLET, FILM COATED ORAL
Qty: 60 TABLET | Refills: 5 | Status: SHIPPED | OUTPATIENT
Start: 2023-05-15

## 2023-05-15 NOTE — TELEPHONE ENCOUNTER
Last visit: 2/23/23  Last Med refill: 11/15/22  Does patient have enough medication for 72 hours: No:     Next Visit Date:  No future appointments.     Health Maintenance   Topic Date Due    Hib vaccine (1 of 1 - Risk 1-dose series) Never done    Meningococcal (ACWY) vaccine (1 - Risk 2-dose series) Never done    Varicella vaccine (2 of 2 - 2-dose childhood series) 11/18/2000    HIV screen  Never done    Hepatitis C screen  Never done    Shingles vaccine (1 of 2) 03/11/2008    COVID-19 Vaccine (1) 07/21/2023 (Originally 1990)    Flu vaccine (Season Ended) 11/15/2023 (Originally 8/1/2023)    Depression Monitoring  02/23/2024    DTaP/Tdap/Td vaccine (3 - Td or Tdap) 09/04/2032    Pneumococcal 0-64 years Vaccine  Completed    Hepatitis A vaccine  Aged Out       No results found for: LABA1C          ( goal A1C is < 7)   No results found for: LABMICR  LDL Calculated (mg/dL)   Date Value   09/21/2016 105       (goal LDL is <100)   AST (U/L)   Date Value   04/30/2018 35     ALT (U/L)   Date Value   04/30/2018 42 (H)     BUN (mg/dL)   Date Value   04/30/2018 14     BP Readings from Last 3 Encounters:   11/15/22 130/80   09/04/22 (!) 167/109   11/27/21 (!) 149/100          (goal 120/80)    All Future Testing planned in [facility]  Lab Frequency Next Occurrence               Patient Active Problem List:     Respiratory insufficiency     Traumatic brain injury ([facility] [address]     PTSD (post-traumatic stress disorder)     Suicidal ideation     Depression     Opioid abuse, in remission ([facility] [address]     Carotid aneurysm, left (HCC)     Depression, major, recurrent ([facility] [address]

## 2023-06-05 DIAGNOSIS — F90.9 ATTENTION DEFICIT HYPERACTIVITY DISORDER (ADHD), UNSPECIFIED ADHD TYPE: ICD-10-CM

## 2023-06-05 NOTE — TELEPHONE ENCOUNTER
Last visit: 02/23/2023  Last Med refill: 05/05/2023  Does patient have enough medication for 72 hours: No:     Next Visit Date:  No future appointments.     Health Maintenance   Topic Date Due    Hib vaccine (1 of 1 - Risk 1-dose series) Never done    Meningococcal (ACWY) vaccine (1 - Risk 2-dose series) Never done    Meningococcal B vaccine (1 of 4 - Increased Risk Bexsero 2-dose series) Never done    Varicella vaccine (2 of 2 - 2-dose childhood series) 11/18/2000    HIV screen  Never done    Hepatitis C screen  Never done    Shingles vaccine (1 of 2) 03/11/2008    COVID-19 Vaccine (1) 07/21/2023 (Originally 1990)    Flu vaccine (Season Ended) 11/15/2023 (Originally 8/1/2023)    Depression Monitoring  02/23/2024    DTaP/Tdap/Td vaccine (3 - Td or Tdap) 09/04/2032    Pneumococcal 0-64 years Vaccine  Completed    Hepatitis A vaccine  Aged Out       No results found for: LABA1C          ( goal A1C is < 7)   No results found for: LABMICR  LDL Calculated (mg/dL)   Date Value   09/21/2016 105       (goal LDL is <100)   AST (U/L)   Date Value   04/30/2018 35     ALT (U/L)   Date Value   04/30/2018 42 (H)     BUN (mg/dL)   Date Value   04/30/2018 14     BP Readings from Last 3 Encounters:   11/15/22 130/80   09/04/22 (!) 167/109   11/27/21 (!) 149/100          (goal 120/80)    All Future Testing planned in [facility]  Lab Frequency Next Occurrence               Patient Active Problem List:     Respiratory insufficiency     Traumatic brain injury ([facility] [address]     PTSD (post-traumatic stress disorder)     Suicidal ideation     Depression     Opioid abuse, in remission ([facility] [address]     Carotid aneurysm, left (HCC)     Depression, major, recurrent ([facility] [address]

## 2023-06-06 NOTE — TELEPHONE ENCOUNTER
Last visit: 2/23/23  Last Med refill: 11/18/22  Does patient have enough medication for 72 hours: No:     Next Visit Date:  No future appointments.     Health Maintenance   Topic Date Due    Hib vaccine (1 of 1 - Risk 1-dose series) Never done    Meningococcal (ACWY) vaccine (1 - Risk 2-dose series) Never done    Meningococcal B vaccine (1 of 4 - Increased Risk Bexsero 2-dose series) Never done    Varicella vaccine (2 of 2 - 2-dose childhood series) 11/18/2000    HIV screen  Never done    Hepatitis C screen  Never done    Shingles vaccine (1 of 2) 03/11/2008    COVID-19 Vaccine (1) 07/21/2023 (Originally 1990)    Flu vaccine (Season Ended) 11/15/2023 (Originally 8/1/2023)    Depression Monitoring  02/23/2024    DTaP/Tdap/Td vaccine (3 - Td or Tdap) 09/04/2032    Pneumococcal 0-64 years Vaccine  Completed    Hepatitis A vaccine  Aged Out       No results found for: LABA1C          ( goal A1C is < 7)   No results found for: LABMICR  LDL Calculated (mg/dL)   Date Value   09/21/2016 105       (goal LDL is <100)   AST (U/L)   Date Value   04/30/2018 35     ALT (U/L)   Date Value   04/30/2018 42 (H)     BUN (mg/dL)   Date Value   04/30/2018 14     BP Readings from Last 3 Encounters:   11/15/22 130/80   09/04/22 (!) 167/109   11/27/21 (!) 149/100          (goal 120/80)    All Future Testing planned in [facility]  Lab Frequency Next Occurrence               Patient Active Problem List:     Respiratory insufficiency     Traumatic brain injury ([facility] [address]     PTSD (post-traumatic stress disorder)     Suicidal ideation     Depression     Opioid abuse, in remission ([facility] [address]     Carotid aneurysm, left (HCC)     Depression, major, recurrent ([facility] [address]

## 2023-06-07 RX ORDER — OMEPRAZOLE 20 MG/1
CAPSULE, DELAYED RELEASE ORAL
Qty: 30 CAPSULE | Refills: 5 | Status: SHIPPED | OUTPATIENT
Start: 2023-06-07

## 2023-06-07 RX ORDER — DEXTROAMPHETAMINE SACCHARATE, AMPHETAMINE ASPARTATE, DEXTROAMPHETAMINE SULFATE AND AMPHETAMINE SULFATE 5; 5; 5; 5 MG/1; MG/1; MG/1; MG/1
20 TABLET ORAL 2 TIMES DAILY
Qty: 60 TABLET | Refills: 0 | Status: SHIPPED | OUTPATIENT
Start: 2023-06-07 | End: 2023-07-07

## 2023-06-08 ENCOUNTER — TELEMEDICINE (OUTPATIENT)
Dept: FAMILY MEDICINE CLINIC | Age: 33
End: 2023-06-08
Payer: MEDICAID

## 2023-06-08 DIAGNOSIS — F90.2 ATTENTION DEFICIT HYPERACTIVITY DISORDER (ADHD), COMBINED TYPE: Primary | ICD-10-CM

## 2023-06-08 DIAGNOSIS — Z76.0 MEDICATION REFILL: ICD-10-CM

## 2023-06-08 PROCEDURE — 99213 OFFICE O/P EST LOW 20 MIN: CPT | Performed by: NURSE PRACTITIONER

## 2023-06-08 RX ORDER — DEXTROAMPHETAMINE SACCHARATE, AMPHETAMINE ASPARTATE, DEXTROAMPHETAMINE SULFATE AND AMPHETAMINE SULFATE 5; 5; 5; 5 MG/1; MG/1; MG/1; MG/1
20 TABLET ORAL 2 TIMES DAILY
Qty: 60 TABLET | Refills: 0 | Status: SHIPPED | OUTPATIENT
Start: 2023-08-08 | End: 2023-09-07

## 2023-06-08 RX ORDER — DEXTROAMPHETAMINE SACCHARATE, AMPHETAMINE ASPARTATE, DEXTROAMPHETAMINE SULFATE AND AMPHETAMINE SULFATE 5; 5; 5; 5 MG/1; MG/1; MG/1; MG/1
20 TABLET ORAL 2 TIMES DAILY
Qty: 60 TABLET | Refills: 0 | Status: SHIPPED | OUTPATIENT
Start: 2023-09-07 | End: 2023-10-07

## 2023-06-08 RX ORDER — DEXTROAMPHETAMINE SACCHARATE, AMPHETAMINE ASPARTATE, DEXTROAMPHETAMINE SULFATE AND AMPHETAMINE SULFATE 5; 5; 5; 5 MG/1; MG/1; MG/1; MG/1
20 TABLET ORAL 2 TIMES DAILY
Qty: 60 TABLET | Refills: 0 | Status: SHIPPED | OUTPATIENT
Start: 2023-07-07 | End: 2023-08-06

## 2023-06-08 RX ORDER — GABAPENTIN 600 MG/1
TABLET ORAL
Qty: 120 TABLET | Refills: 3 | Status: SHIPPED | OUTPATIENT
Start: 2023-06-08 | End: 2023-09-03

## 2023-06-08 ASSESSMENT — ENCOUNTER SYMPTOMS
SHORTNESS OF BREATH: 0
COUGH: 0

## 2023-06-08 NOTE — PROGRESS NOTES
Patient is present for 3 month f/u ADHD
[x] Normal  [] Abnormal -    Neck: [x] No visualized mass [] Abnormal -     Pulmonary/Chest: [x] Respiratory effort normal   [x] No visualized signs of difficulty breathing or respiratory distress        [] Abnormal -      Musculoskeletal:   [x] Normal gait with no signs of ataxia         [x] Normal range of motion of neck        [] Abnormal -     Neurological:        [x] No Facial Asymmetry (Cranial nerve 7 motor function) (limited exam due to video visit)          [x] No gaze palsy        [] Abnormal -          Skin:        [x] No significant exanthematous lesions or discoloration noted on facial skin         [] Abnormal -            Psychiatric:       [x] Normal Affect [] Abnormal -        [x] No Hallucinations    Other pertinent observable physical exam findings:-      Manjinder Montalvo, was evaluated through a synchronous (real-time) audio-video encounter. The patient (or guardian if applicable) is aware that this is a billable service, which includes applicable co-pays. This Virtual Visit was conducted with patient's (and/or legal guardian's) consent. Patient identification was verified, and a caregiver was present when appropriate.    The patient was located at Home: [address] [employer]  Provider was located at Home ([facility] 2): [address]         --PHOEBE Gotti - CNP

## 2023-06-29 ENCOUNTER — TELEMEDICINE (OUTPATIENT)
Dept: FAMILY MEDICINE CLINIC | Age: 33
End: 2023-06-29
Payer: MEDICAID

## 2023-06-29 DIAGNOSIS — F10.11 ALCOHOL ABUSE, IN REMISSION: Primary | ICD-10-CM

## 2023-06-29 DIAGNOSIS — Z79.899 MEDICATION MANAGEMENT: ICD-10-CM

## 2023-06-29 PROCEDURE — 99213 OFFICE O/P EST LOW 20 MIN: CPT | Performed by: NURSE PRACTITIONER

## 2023-06-30 ENCOUNTER — HOSPITAL ENCOUNTER (OUTPATIENT)
Age: 33
Setting detail: SPECIMEN
Discharge: HOME | End: 2023-06-30

## 2023-06-30 ENCOUNTER — NURSE ONLY (OUTPATIENT)
Dept: FAMILY MEDICINE CLINIC | Age: 33
End: 2023-06-30

## 2023-06-30 DIAGNOSIS — Z79.899 MEDICATION MANAGEMENT: ICD-10-CM

## 2023-06-30 DIAGNOSIS — F10.11 ALCOHOL ABUSE, IN REMISSION: Primary | ICD-10-CM

## 2023-06-30 DIAGNOSIS — F10.11 ALCOHOL ABUSE, IN REMISSION: ICD-10-CM

## 2023-06-30 LAB
ALBUMIN SERPL-MCNC: 4.6 G/DL (ref 3.5–5.2)
ALBUMIN/GLOB SERPL: 1.4 {RATIO} (ref 1–2.5)
ALCOHOL URINE: ABNORMAL
ALP SERPL-CCNC: 119 U/L (ref 40–129)
ALT SERPL-CCNC: 52 U/L (ref 5–41)
AMPHETAMINE SCREEN, URINE: POSITIVE
ANION GAP SERPL CALCULATED.3IONS-SCNC: 18 MMOL/L (ref 9–17)
AST SERPL-CCNC: 32 U/L
BARBITURATE SCREEN, URINE: ABNORMAL
BENZODIAZEPINE SCREEN, URINE: POSITIVE
BILIRUB SERPL-MCNC: 0.5 MG/DL (ref 0.3–1.2)
BUN SERPL-MCNC: 11 MG/DL (ref 6–20)
BUPRENORPHINE URINE: ABNORMAL
CALCIUM SERPL-MCNC: 9.8 MG/DL (ref 8.6–10.4)
CHLORIDE SERPL-SCNC: 102 MMOL/L (ref 98–107)
CO2 SERPL-SCNC: 20 MMOL/L (ref 20–31)
COCAINE METABOLITE SCREEN URINE: ABNORMAL
CREAT SERPL-MCNC: 0.78 MG/DL (ref 0.7–1.2)
ERYTHROCYTE [DISTWIDTH] IN BLOOD BY AUTOMATED COUNT: 15.7 % (ref 11.8–14.4)
FENTANYL SCREEN, URINE: ABNORMAL
GABAPENTIN SCREEN, URINE: ABNORMAL
GFR SERPL CREATININE-BSD FRML MDRD: >60 ML/MIN/1.73M2
GLUCOSE SERPL-MCNC: 95 MG/DL (ref 70–99)
HCT VFR BLD AUTO: 50.6 % (ref 40.7–50.3)
HGB BLD-MCNC: 16.7 G/DL (ref 13–17)
MCH RBC QN AUTO: 28.6 PG (ref 25.2–33.5)
MCHC RBC AUTO-ENTMCNC: 33 G/DL (ref 28.4–34.8)
MCV RBC AUTO: 86.6 FL (ref 82.6–102.9)
MDMA URINE: ABNORMAL
METHADONE SCREEN, URINE: ABNORMAL
METHAMPHETAMINE, URINE: ABNORMAL
NRBC BLD-RTO: 0 PER 100 WBC
OPIATE SCREEN URINE: ABNORMAL
OXYCODONE SCREEN URINE: ABNORMAL
PHENCYCLIDINE SCREEN URINE: ABNORMAL
PLATELET # BLD AUTO: 584 K/UL (ref 138–453)
PMV BLD AUTO: 10.3 FL (ref 8.1–13.5)
POTASSIUM SERPL-SCNC: 4.3 MMOL/L (ref 3.7–5.3)
PROPOXYPHENE SCREEN, URINE: ABNORMAL
PROT SERPL-MCNC: 8 G/DL (ref 6.4–8.3)
RBC # BLD AUTO: 5.84 M/UL (ref 4.21–5.77)
SODIUM SERPL-SCNC: 140 MMOL/L (ref 135–144)
SYNTHETIC CANNABINOIDS(K2) SCREEN, URINE: ABNORMAL
THC SCREEN, URINE: POSITIVE
TRAMADOL SCREEN URINE: ABNORMAL
TRICYCLIC ANTIDEPRESSANTS, UR: ABNORMAL
WBC OTHER # BLD: 8.8 K/UL (ref 3.5–11.3)

## 2023-07-06 LAB
6-ACETYLMORPHINE, UR: NOT DETECTED
7-AMINOCLONAZEPAM, URINE: NOT DETECTED
ALPHA-OH-ALPRAZ, URINE: NOT DETECTED
ALPHA-OH-MIDAZOLAM, URINE: NOT DETECTED
ALPRAZOLAM, URINE: NOT DETECTED
AMPHETAMINES, URINE: PRESENT
BARBITURATES, URINE: NOT DETECTED
BENZOYLECGONINE, UR: NOT DETECTED
BUPRENORPHINE URINE: NOT DETECTED
CARISOPRODOL, UR: NOT DETECTED
CLONAZEPAM, URINE: NOT DETECTED
CODEINE, URINE: NOT DETECTED
CREATININE URINE: 68.7 MG/DL (ref 20–400)
DIAZEPAM, URINE: NOT DETECTED
DRUGS EXPECTED, UR: NORMAL
EER HI RES INTERP UR: NORMAL
ETHYL GLUCURONIDE UR: NOT DETECTED
FENTANYL URINE: NOT DETECTED
GABAPENTIN: PRESENT
HYDROCODONE, URINE: NOT DETECTED
HYDROMORPHONE, URINE: NOT DETECTED
LORAZEPAM, URINE: NOT DETECTED
MARIJUANA METAB, UR: PRESENT
MDA, UR: NOT DETECTED
MDEA, EVE, UR: NOT DETECTED
MDMA URINE: NOT DETECTED
MEPERIDINE METAB, UR: NOT DETECTED
METHADONE, URINE: NOT DETECTED
METHAMPHETAMINE, URINE: NOT DETECTED
METHYLPHENIDATE: NOT DETECTED
MIDAZOLAM, URINE: NOT DETECTED
MORPHINE URINE: NOT DETECTED
NALOXONE URINE: NOT DETECTED
NORBUPRENORPHINE, URINE: NOT DETECTED
NORDIAZEPAM, URINE: NOT DETECTED
NORFENTANYL, URINE: NOT DETECTED
NORHYDROCODONE, URINE: NOT DETECTED
NOROXYCODONE, URINE: NOT DETECTED
NOROXYMORPHONE, URINE: NOT DETECTED
OXAZEPAM, URINE: PRESENT
OXYCODONE URINE: NOT DETECTED
OXYMORPHONE, URINE: NOT DETECTED
PAIN MANAGEMENT DRUG PANEL INTERP, URINE: NORMAL
PAIN MGT DRUG PANEL, HI RES, UR: NORMAL
PCP,URINE: NOT DETECTED
PHENTERMINE, UR: NOT DETECTED
PREGABALIN: NOT DETECTED
TAPENTADOL, URINE: NOT DETECTED
TAPENTADOL-O-SULFATE, URINE: NOT DETECTED
TEMAZEPAM, URINE: NOT DETECTED
TRAMADOL, URINE: NOT DETECTED
ZOLPIDEM METABOLITE (ZCA), URINE: NOT DETECTED
ZOLPIDEM, URINE: NOT DETECTED

## 2023-07-11 ASSESSMENT — ENCOUNTER SYMPTOMS
SHORTNESS OF BREATH: 0
COUGH: 0

## 2023-08-10 DIAGNOSIS — F10.11 ALCOHOL ABUSE, IN REMISSION: ICD-10-CM

## 2023-08-10 NOTE — TELEPHONE ENCOUNTER
Patient called to schedule vivitrol injection. Patient now has [employer]. Due to insurance, vivitrol now needs to be sent to [address]

## 2023-09-01 ENCOUNTER — OFFICE VISIT (OUTPATIENT)
Dept: FAMILY MEDICINE CLINIC | Age: 33
End: 2023-09-01

## 2023-09-01 VITALS
HEART RATE: 96 BPM | OXYGEN SATURATION: 95 % | BODY MASS INDEX: 26.96 KG/M2 | DIASTOLIC BLOOD PRESSURE: 70 MMHG | SYSTOLIC BLOOD PRESSURE: 130 MMHG | WEIGHT: 210 LBS

## 2023-09-01 DIAGNOSIS — F10.11 ALCOHOL ABUSE, IN REMISSION: Primary | ICD-10-CM

## 2023-09-01 DIAGNOSIS — F90.2 ATTENTION DEFICIT HYPERACTIVITY DISORDER (ADHD), COMBINED TYPE: ICD-10-CM

## 2023-09-01 DIAGNOSIS — F90.9 ATTENTION DEFICIT HYPERACTIVITY DISORDER (ADHD), UNSPECIFIED ADHD TYPE: ICD-10-CM

## 2023-09-01 LAB
ALCOHOL URINE: ABNORMAL
AMPHETAMINE SCREEN, URINE: POSITIVE
BARBITURATE SCREEN, URINE: ABNORMAL
BENZODIAZEPINE SCREEN, URINE: ABNORMAL
BUPRENORPHINE URINE: ABNORMAL
COCAINE METABOLITE SCREEN URINE: ABNORMAL
FENTANYL SCREEN, URINE: ABNORMAL
GABAPENTIN SCREEN, URINE: ABNORMAL
MDMA URINE: ABNORMAL
METHADONE SCREEN, URINE: ABNORMAL
METHAMPHETAMINE, URINE: ABNORMAL
OPIATE SCREEN URINE: ABNORMAL
OXYCODONE SCREEN URINE: ABNORMAL
PHENCYCLIDINE SCREEN URINE: ABNORMAL
PROPOXYPHENE SCREEN, URINE: ABNORMAL
SYNTHETIC CANNABINOIDS(K2) SCREEN, URINE: ABNORMAL
THC SCREEN, URINE: POSITIVE
TRAMADOL SCREEN URINE: ABNORMAL
TRICYCLIC ANTIDEPRESSANTS, UR: ABNORMAL

## 2023-09-01 RX ORDER — DEXTROAMPHETAMINE SACCHARATE, AMPHETAMINE ASPARTATE, DEXTROAMPHETAMINE SULFATE AND AMPHETAMINE SULFATE 5; 5; 5; 5 MG/1; MG/1; MG/1; MG/1
20 TABLET ORAL 2 TIMES DAILY
Qty: 60 TABLET | Refills: 0 | Status: SHIPPED | OUTPATIENT
Start: 2023-12-05 | End: 2024-01-04

## 2023-09-01 RX ORDER — DEXTROAMPHETAMINE SACCHARATE, AMPHETAMINE ASPARTATE, DEXTROAMPHETAMINE SULFATE AND AMPHETAMINE SULFATE 5; 5; 5; 5 MG/1; MG/1; MG/1; MG/1
20 TABLET ORAL 2 TIMES DAILY
Qty: 60 TABLET | Refills: 0 | Status: SHIPPED | OUTPATIENT
Start: 2023-10-06 | End: 2023-11-05

## 2023-09-01 RX ORDER — DEXTROAMPHETAMINE SACCHARATE, AMPHETAMINE ASPARTATE, DEXTROAMPHETAMINE SULFATE AND AMPHETAMINE SULFATE 5; 5; 5; 5 MG/1; MG/1; MG/1; MG/1
20 TABLET ORAL 2 TIMES DAILY
Qty: 60 TABLET | Refills: 0 | Status: SHIPPED | OUTPATIENT
Start: 2023-11-03 | End: 2023-12-03

## 2023-09-01 ASSESSMENT — ENCOUNTER SYMPTOMS
COUGH: 0
SHORTNESS OF BREATH: 0

## 2023-09-01 NOTE — PROGRESS NOTES
Abe Lunsford (:  1990) is a 28 y.o. male,Established patient, here for evaluation of the following chief complaint(s):  Follow-up (vivitrol)         ASSESSMENT/PLAN:  1. Alcohol abuse, in remission  -     POCT Rapid Drug Screen  The following orders have not been finalized:  -     naltrexone (VIVITROL) injection 380 mg    Doing well on med   No concerns    No follow-ups on file. Subjective   SUBJECTIVE/OBJECTIVE:  HPI      Review of Systems   Constitutional:  Negative for chills and fever. Respiratory:  Negative for cough and shortness of breath. Cardiovascular:  Negative for chest pain and leg swelling. Objective   Vitals:    23 1136   BP: 130/70   Pulse: 96   SpO2: 95%     Wt Readings from Last 3 Encounters:   23 210 lb (95.3 kg)   11/15/22 241 lb (109.3 kg)   22 230 lb (104.3 kg)       Physical Exam  Constitutional:       General: He is not in acute distress. Appearance: He is well-developed. HENT:      Head: Normocephalic. Right Ear: External ear normal.      Left Ear: External ear normal.   Cardiovascular:      Rate and Rhythm: Normal rate and regular rhythm. Heart sounds: Normal heart sounds. Pulmonary:      Effort: Pulmonary effort is normal.      Breath sounds: Normal breath sounds. Musculoskeletal:         General: Normal range of motion. Skin:     General: Skin is warm and dry. Neurological:      Mental Status: He is alert and oriented to person, place, and time. An electronic signature was used to authenticate this note.     --PHOEBE Abernathy - CNP

## 2023-09-13 DIAGNOSIS — F10.11 ALCOHOL ABUSE, IN REMISSION: ICD-10-CM

## 2023-09-13 NOTE — TELEPHONE ENCOUNTER
Abe Lunsford is calling to request a refill on the following medication(s):    Medication Request:  Requested Prescriptions     Pending Prescriptions Disp Refills    naltrexone (VIVITROL) 380 MG injection 4 mL 0     Sig: Inject 380 mg into the muscle once for 1 dose       Last Visit Date (If Applicable):  2/8/6488    Next Visit Date:    12/1/2023

## 2023-09-15 ENCOUNTER — TELEPHONE (OUTPATIENT)
Dept: FAMILY MEDICINE CLINIC | Age: 33
End: 2023-09-15

## 2023-09-15 NOTE — TELEPHONE ENCOUNTER
[facility] [facility] pharmacy calling to ask what is the frequency of the vivitrol,

## 2023-09-21 NOTE — TELEPHONE ENCOUNTER
Spoke with pharmacy and informed them of frequency. States they werent able to get a hold of our office and cancelled script. Explained to them that we faxed this information to them last week. States they do not have record of this. States they will reprocess the script and it takes 24-48hrs. States they will call back to schedule delivery.

## 2023-09-26 ENCOUNTER — TELEPHONE (OUTPATIENT)
Dept: FAMILY MEDICINE CLINIC | Age: 33
End: 2023-09-26

## 2023-09-26 NOTE — TELEPHONE ENCOUNTER
[place] to schedule delivery for vivitrol due to them not contacting our office. They stated they have to run the patient's insurance still. States they have to do this each month and for some reason it did not get done this month. States they have to do this manually. States they will call back once this is completed. States they should have this done and have the medication delivered before 9/29.

## 2023-10-06 DIAGNOSIS — Z76.0 MEDICATION REFILL: ICD-10-CM

## 2023-10-06 NOTE — TELEPHONE ENCOUNTER
Deonte Mcconnell is calling to request a refill on the following medication(s):    Medication Request:  Requested Prescriptions     Pending Prescriptions Disp Refills    gabapentin (NEURONTIN) 600 MG tablet [Pharmacy Med Name: GABAPENTIN 600 MG TABLET] 120 tablet 3     Sig: take 1 tablet by mouth four times a day       Last Visit Date (If Applicable):  9/6/1414    Next Visit Date:    12/1/2023

## 2023-10-10 RX ORDER — NALTREXONE 380 MG
KIT INTRAMUSCULAR
Qty: 1 EACH | Refills: 0 | OUTPATIENT
Start: 2023-10-10

## 2023-10-10 RX ORDER — GABAPENTIN 600 MG/1
TABLET ORAL
Qty: 120 TABLET | Refills: 2 | Status: SHIPPED | OUTPATIENT
Start: 2023-10-10 | End: 2024-01-10

## 2023-10-10 NOTE — TELEPHONE ENCOUNTER
Patient cancelled last appt for vivitrol. Office still has vivitrol injection and does not need a refill at this time.

## 2023-12-04 NOTE — TELEPHONE ENCOUNTER
Last Visit:  9/1/2023     Next Visit Date:  No future appointments.     Health Maintenance   Topic Date Due    Hepatitis B vaccine (1 of 3 - 3-dose series) Never done    COVID-19 Vaccine (1) Never done    Hib vaccine (1 of 1 - Risk 1-dose series) Never done    Meningococcal (ACWY) vaccine (1 - Risk 2-dose series) Never done    Meningococcal B vaccine (1 of 4 - Increased Risk) Never done    Varicella vaccine (2 of 2 - 2-dose childhood series) 11/18/2000    HIV screen  Never done    Hepatitis C screen  Never done    Pneumococcal 0-64 years Vaccine (2 - PCV) 02/25/2020    Flu vaccine (1) 08/01/2023    Depression Monitoring  02/23/2024    DTaP/Tdap/Td vaccine (3 - Td or Tdap) 09/04/2032    Shingles vaccine (1 of 2) 03/11/2039    Hepatitis A vaccine  Aged Out    HPV vaccine  Aged Out       No results found for: \"LABA1C\"          ( goal A1C is < 7)   No components found for: \"LABMICR\"  LDL Calculated (mg/dL)   Date Value   09/21/2016 105       (goal LDL is <100)   AST (U/L)   Date Value   06/30/2023 32     ALT (U/L)   Date Value   06/30/2023 52 (H)     BUN (mg/dL)   Date Value   06/30/2023 11     BP Readings from Last 3 Encounters:   09/01/23 130/70   11/15/22 130/80   09/04/22 (!) 167/109          (goal 120/80)    All Future Testing planned in [facility]  Lab Frequency Next Occurrence               Patient Active Problem List:     Respiratory insufficiency     Traumatic brain injury ([address])     PTSD (post-traumatic stress disorder)     Suicidal ideation     Depression     Opioid abuse, in remission ([address])     Carotid aneurysm, left (HCC)     Depression, major, recurrent ([address])

## 2023-12-05 RX ORDER — OMEPRAZOLE 20 MG/1
CAPSULE, DELAYED RELEASE ORAL
Qty: 90 CAPSULE | Refills: 1 | Status: SHIPPED | OUTPATIENT
Start: 2023-12-05

## 2023-12-07 ENCOUNTER — TELEMEDICINE (OUTPATIENT)
Dept: FAMILY MEDICINE CLINIC | Age: 33
End: 2023-12-07
Payer: COMMERCIAL

## 2023-12-07 DIAGNOSIS — Z76.0 MEDICATION REFILL: ICD-10-CM

## 2023-12-07 DIAGNOSIS — F90.2 ATTENTION DEFICIT HYPERACTIVITY DISORDER (ADHD), COMBINED TYPE: Primary | ICD-10-CM

## 2023-12-07 PROCEDURE — 99213 OFFICE O/P EST LOW 20 MIN: CPT | Performed by: NURSE PRACTITIONER

## 2023-12-07 PROCEDURE — G8427 DOCREV CUR MEDS BY ELIG CLIN: HCPCS | Performed by: NURSE PRACTITIONER

## 2023-12-07 RX ORDER — DEXTROAMPHETAMINE SACCHARATE, AMPHETAMINE ASPARTATE, DEXTROAMPHETAMINE SULFATE AND AMPHETAMINE SULFATE 5; 5; 5; 5 MG/1; MG/1; MG/1; MG/1
20 TABLET ORAL 2 TIMES DAILY
Qty: 60 TABLET | Refills: 0 | Status: SHIPPED | OUTPATIENT
Start: 2024-01-06 | End: 2024-02-05

## 2023-12-07 RX ORDER — LISINOPRIL 10 MG/1
10 TABLET ORAL DAILY
COMMUNITY
Start: 2023-11-27

## 2023-12-07 RX ORDER — DEXTROAMPHETAMINE SACCHARATE, AMPHETAMINE ASPARTATE, DEXTROAMPHETAMINE SULFATE AND AMPHETAMINE SULFATE 5; 5; 5; 5 MG/1; MG/1; MG/1; MG/1
20 TABLET ORAL 2 TIMES DAILY
Qty: 60 TABLET | Refills: 0 | Status: SHIPPED | OUTPATIENT
Start: 2024-02-05 | End: 2024-03-06

## 2023-12-07 RX ORDER — DEXTROAMPHETAMINE SACCHARATE, AMPHETAMINE ASPARTATE, DEXTROAMPHETAMINE SULFATE AND AMPHETAMINE SULFATE 5; 5; 5; 5 MG/1; MG/1; MG/1; MG/1
20 TABLET ORAL 2 TIMES DAILY
Qty: 60 TABLET | Refills: 0 | Status: SHIPPED | OUTPATIENT
Start: 2023-12-07 | End: 2024-01-06

## 2023-12-07 RX ORDER — GABAPENTIN 600 MG/1
600 TABLET ORAL 4 TIMES DAILY
Qty: 120 TABLET | Refills: 2 | Status: SHIPPED | OUTPATIENT
Start: 2023-12-07 | End: 2024-03-08

## 2023-12-07 ASSESSMENT — ENCOUNTER SYMPTOMS
COUGH: 0
SHORTNESS OF BREATH: 0

## 2023-12-07 NOTE — PROGRESS NOTES
Lauren Castillo, was evaluated through a synchronous (real-time) audio-video encounter. The patient (or guardian if applicable) is aware that this is a billable service, which includes applicable co-pays. This Virtual Visit was conducted with patient's (and/or legal guardian's) consent. Patient identification was verified, and a caregiver was present when appropriate. The patient was located at Home: [address] [address]  Provider was located at Home ([facility]): [address] [facility] (:  1990) is a Established patient, presenting virtually for evaluation of the following:    Assessment & Plan   Below is the assessment and plan developed based on review of pertinent history, physical exam, labs, studies, and medications. 1. Attention deficit hyperactivity disorder (ADHD), combined type  -     amphetamine-dextroamphetamine (ADDERALL) 20 MG tablet; Take 1 tablet by mouth 2 times daily for 30 days. Max Daily Amount: 40 mg, Disp-60 tablet, R-0Normal  -     amphetamine-dextroamphetamine (ADDERALL) 20 MG tablet; Take 1 tablet by mouth 2 times daily for 30 days. Max Daily Amount: 40 mg, Disp-60 tablet, R-0Normal  -     amphetamine-dextroamphetamine (ADDERALL) 20 MG tablet; Take 1 tablet by mouth 2 times daily for 30 days. Max Daily Amount: 40 mg, Disp-60 tablet, R-0Normal  2. Medication refill  -     gabapentin (NEURONTIN) 600 MG tablet; Take 1 tablet by mouth in the morning, at noon, in the evening, and at bedtime for 92 days. , Disp-120 tablet, R-2Normal    Return in 3 months (on 3/7/2024). Subjective   ADHD  Pertinent negatives include no chest pain, chills, coughing or fever. Medication Refill  Pertinent negatives include no chest pain, chills, coughing or fever. He is seeing psychiatrist. He is going to start seeing a therapist. Koby Robins are working well.   He was having some issues with his daughters mother and he was in [place], they found his bp to be elevated and started him on

## 2023-12-15 ENCOUNTER — NURSE ONLY (OUTPATIENT)
Dept: FAMILY MEDICINE CLINIC | Age: 33
End: 2023-12-15
Payer: COMMERCIAL

## 2023-12-15 VITALS — SYSTOLIC BLOOD PRESSURE: 148 MMHG | DIASTOLIC BLOOD PRESSURE: 92 MMHG

## 2023-12-15 DIAGNOSIS — I10 PRIMARY HYPERTENSION: Primary | ICD-10-CM

## 2023-12-15 PROCEDURE — 99211 OFF/OP EST MAY X REQ PHY/QHP: CPT | Performed by: NURSE PRACTITIONER

## 2023-12-15 RX ORDER — LISINOPRIL AND HYDROCHLOROTHIAZIDE 12.5; 1 MG/1; MG/1
1 TABLET ORAL DAILY
Qty: 90 TABLET | Refills: 1 | Status: SHIPPED | OUTPATIENT
Start: 2023-12-15

## 2023-12-15 NOTE — PROGRESS NOTES
Hctz added to lisinopril- because bp is still high. Please have him follow up in 1 month for bp recheck with office visit.

## 2023-12-15 NOTE — PROGRESS NOTES
Pt is here today for a BP check. Vitals are as follows. Sitting   148/92 LT  arm              Pt denies CP, HA or SOB.     Had pt sit another 5/7 minutes, rechecked BP it was 144/84    Pt stated he last took his BP meds yesterday evening, has not taken his Adderall yesterday or today to see if that was the cause     Pt will need refill on BP meds if he continues current med

## 2023-12-28 DIAGNOSIS — Z76.0 MEDICATION REFILL: ICD-10-CM

## 2023-12-28 RX ORDER — SERTRALINE HYDROCHLORIDE 100 MG/1
TABLET, FILM COATED ORAL
Qty: 180 TABLET | Refills: 1 | Status: SHIPPED | OUTPATIENT
Start: 2023-12-28

## 2023-12-28 NOTE — TELEPHONE ENCOUNTER
Last visit: 12/15/2023  Last Med refill: 12/06/2023  Does patient have enough medication for 72 hours: yes    Next Visit Date:  Future Appointments   Date Time Provider [address]   1/12/2024 10:30 AM SCHEDULE, [facility] Maintenance   Topic Date Due    Hib vaccine (1 of 1 - Risk 1-dose series) Never done    Meningococcal (ACWY) vaccine (1 - Risk 2-dose series) Never done    Meningococcal B vaccine (1 of 4 - Increased Risk) Never done    Varicella vaccine (2 of 2 - 2-dose childhood series) 11/18/2000    Pneumococcal 0-64 years Vaccine (2 - PCV) 02/25/2020    Flu vaccine (1) 12/07/2024 (Originally 8/1/2023)    COVID-19 Vaccine (1) 12/07/2025 (Originally 1989)    Depression Monitoring  02/23/2024    DTaP/Tdap/Td vaccine (3 - Td or Tdap) 09/04/2032    Shingles vaccine (1 of 2) 03/11/2039    Hepatitis C screen  Completed    HIV screen  Completed    Hepatitis A vaccine  Aged Out    HPV vaccine  Aged Out    Polio vaccine  Aged Out    Hepatitis B vaccine  Discontinued       No results found for: \"LABA1C\"          ( goal A1C is < 7)   No components found for: \"LABMICR\"  LDL Calculated (mg/dL)   Date Value   09/21/2016 105       (goal LDL is <100)   AST (U/L)   Date Value   06/30/2023 32     ALT (U/L)   Date Value   06/30/2023 52 (H)     BUN (mg/dL)   Date Value   06/30/2023 11     BP Readings from Last 3 Encounters:   12/15/23 (!) 148/92   09/01/23 130/70   11/15/22 130/80          (goal 120/80)    All Future Testing planned in [facility]  Lab Frequency Next Occurrence               Patient Active Problem List:     Respiratory insufficiency     Traumatic brain injury ([address])     PTSD (post-traumatic stress disorder)     Suicidal ideation     Depression     Opioid abuse, in remission ([address])     Carotid aneurysm, left (HCC)     Depression, major, recurrent ([address])

## 2024-01-24 DIAGNOSIS — F90.2 ATTENTION DEFICIT HYPERACTIVITY DISORDER (ADHD), COMBINED TYPE: ICD-10-CM

## 2024-01-24 DIAGNOSIS — Z76.0 MEDICATION REFILL: ICD-10-CM

## 2024-01-24 NOTE — TELEPHONE ENCOUNTER
Last Visit:  12/7/2023     Next Visit Date:  Future Appointments   Date Time Provider Department Center   2/8/2024  9:00 AM Vida Serna, PHOEBE - CNP [facility]

## 2024-01-26 RX ORDER — DEXTROAMPHETAMINE SACCHARATE, AMPHETAMINE ASPARTATE, DEXTROAMPHETAMINE SULFATE AND AMPHETAMINE SULFATE 5; 5; 5; 5 MG/1; MG/1; MG/1; MG/1
20 TABLET ORAL 2 TIMES DAILY
Qty: 60 TABLET | Refills: 0 | OUTPATIENT
Start: 2024-01-26 | End: 2024-02-25

## 2024-01-26 RX ORDER — SERTRALINE HYDROCHLORIDE 100 MG/1
TABLET, FILM COATED ORAL
Qty: 180 TABLET | Refills: 1 | OUTPATIENT
Start: 2024-01-26

## 2024-01-26 RX ORDER — GABAPENTIN 600 MG/1
600 TABLET ORAL 4 TIMES DAILY
Qty: 120 TABLET | Refills: 2 | OUTPATIENT
Start: 2024-01-26 | End: 2024-04-27

## 2024-01-26 RX ORDER — LISINOPRIL 10 MG/1
10 TABLET ORAL 2 TIMES DAILY
Qty: 60 TABLET | Refills: 1 | Status: SHIPPED | OUTPATIENT
Start: 2024-01-26

## 2024-01-26 NOTE — TELEPHONE ENCOUNTER
Spoke with pt, he stated he is taking Lisinopril 10 MG BID.... pt will be out after today     Not taking lisinopril hctz

## 2024-01-26 NOTE — TELEPHONE ENCOUNTER
Please clarify medications    Has both lisinopril 10mg and lisinopril/hctz as active orders - which one is he taking?    Adderall was sent in with future date - should be good until March  Gabapentin was sent in as well for 90 day supply - also good until March   Zoloft sent in 12/28/2023 - 6 month supply

## 2024-02-08 ENCOUNTER — TELEMEDICINE (OUTPATIENT)
Dept: FAMILY MEDICINE CLINIC | Age: 34
End: 2024-02-08
Payer: COMMERCIAL

## 2024-02-08 DIAGNOSIS — F10.11 ALCOHOL ABUSE, IN REMISSION: Primary | ICD-10-CM

## 2024-02-08 DIAGNOSIS — F90.2 ATTENTION DEFICIT HYPERACTIVITY DISORDER (ADHD), COMBINED TYPE: ICD-10-CM

## 2024-02-08 PROCEDURE — G8484 FLU IMMUNIZE NO ADMIN: HCPCS | Performed by: NURSE PRACTITIONER

## 2024-02-08 PROCEDURE — G8417 CALC BMI ABV UP PARAM F/U: HCPCS | Performed by: NURSE PRACTITIONER

## 2024-02-08 PROCEDURE — G8427 DOCREV CUR MEDS BY ELIG CLIN: HCPCS | Performed by: NURSE PRACTITIONER

## 2024-02-08 PROCEDURE — 1036F TOBACCO NON-USER: CPT | Performed by: NURSE PRACTITIONER

## 2024-02-08 PROCEDURE — 99214 OFFICE O/P EST MOD 30 MIN: CPT | Performed by: NURSE PRACTITIONER

## 2024-02-08 RX ORDER — DEXTROAMPHETAMINE SACCHARATE, AMPHETAMINE ASPARTATE, DEXTROAMPHETAMINE SULFATE AND AMPHETAMINE SULFATE 5; 5; 5; 5 MG/1; MG/1; MG/1; MG/1
20 TABLET ORAL 2 TIMES DAILY
Qty: 60 TABLET | Refills: 0 | Status: SHIPPED | OUTPATIENT
Start: 2024-03-07 | End: 2024-04-06

## 2024-02-08 ASSESSMENT — PATIENT HEALTH QUESTIONNAIRE - PHQ9
SUM OF ALL RESPONSES TO PHQ QUESTIONS 1-9: 2
6. FEELING BAD ABOUT YOURSELF - OR THAT YOU ARE A FAILURE OR HAVE LET YOURSELF OR YOUR FAMILY DOWN: 0
4. FEELING TIRED OR HAVING LITTLE ENERGY: 0
8. MOVING OR SPEAKING SO SLOWLY THAT OTHER PEOPLE COULD HAVE NOTICED. OR THE OPPOSITE, BEING SO FIGETY OR RESTLESS THAT YOU HAVE BEEN MOVING AROUND A LOT MORE THAN USUAL: 0
SUM OF ALL RESPONSES TO PHQ9 QUESTIONS 1 & 2: 1
10. IF YOU CHECKED OFF ANY PROBLEMS, HOW DIFFICULT HAVE THESE PROBLEMS MADE IT FOR YOU TO DO YOUR WORK, TAKE CARE OF THINGS AT HOME, OR GET ALONG WITH OTHER PEOPLE: 0
7. TROUBLE CONCENTRATING ON THINGS, SUCH AS READING THE NEWSPAPER OR WATCHING TELEVISION: 0
SUM OF ALL RESPONSES TO PHQ QUESTIONS 1-9: 2
SUM OF ALL RESPONSES TO PHQ QUESTIONS 1-9: 2
5. POOR APPETITE OR OVEREATING: 0
9. THOUGHTS THAT YOU WOULD BE BETTER OFF DEAD, OR OF HURTING YOURSELF: 0
2. FEELING DOWN, DEPRESSED OR HOPELESS: 1
SUM OF ALL RESPONSES TO PHQ QUESTIONS 1-9: 2
3. TROUBLE FALLING OR STAYING ASLEEP: 1
1. LITTLE INTEREST OR PLEASURE IN DOING THINGS: 0

## 2024-02-08 NOTE — PROGRESS NOTES
Patient is doing a VV due to elevated BP  He states his mom has taken BP and the numbers are   130's over 85-90

## 2024-02-08 NOTE — PROGRESS NOTES
Dania Jay, was evaluated through a synchronous (real-time) audio-video encounter. The patient (or guardian if applicable) is aware that this is a billable service, which includes applicable co-pays. This Virtual Visit was conducted with patient's (and/or legal guardian's) consent. Patient identification was verified, and a caregiver was present when appropriate.   The patient was located at Home: [address] [place] [address]  Provider was located at Home ([facility] Dept State): [place]      Dania Jay (:  1990) is a Established patient, presenting virtually for evaluation of the following:    Assessment & Plan   Below is the assessment and plan developed based on review of pertinent history, physical exam, labs, studies, and medications.  1. Alcohol abuse, in remission  2. Attention deficit hyperactivity disorder (ADHD), combined type  -     amphetamine-dextroamphetamine (ADDERALL) 20 MG tablet; Take 1 tablet by mouth 2 times daily for 30 days. Max Daily Amount: 40 mg, Disp-60 tablet, R-0Normal    No follow-ups on file.       Subjective   Hypertension  Pertinent negatives include no chest pain or shortness of breath.     Bp has been good at home. Mom is checking she is a nurse.   Mood has been good.   Wants back on vivotrol- first time he could get shot would be two weeks from today he thinks.    Review of Systems   Constitutional:  Negative for chills and fever.   Respiratory:  Negative for cough and shortness of breath.    Cardiovascular:  Negative for chest pain and leg swelling.          Objective   Patient-Reported Vitals  No data recorded     Physical Exam  [INSTRUCTIONS:  \"[x]\" Indicates a positive item  \"[]\" Indicates a negative item  -- DELETE ALL ITEMS NOT EXAMINED]    Constitutional: [x] Appears well-developed and well-nourished [x] No apparent distress      [] Abnormal -     Mental status: [x] Alert and awake  [x] Oriented to person/place/time [x] Able to follow commands    [] Abnormal -

## 2024-03-02 DIAGNOSIS — Z76.0 MEDICATION REFILL: ICD-10-CM

## 2024-03-05 RX ORDER — GABAPENTIN 600 MG/1
600 TABLET ORAL 4 TIMES DAILY
Qty: 120 TABLET | Refills: 2 | Status: SHIPPED | OUTPATIENT
Start: 2024-03-05 | End: 2024-06-03

## 2024-03-05 NOTE — TELEPHONE ENCOUNTER
Last visit: 02/08/2024  Last Med refill: 02/05/2024  Does patient have enough medication for 72 hours: Yes    Next Visit Date:  No future appointments.    Health Maintenance   Topic Date Due    Hib vaccine (1 of 1 - Risk 1-dose series) Never done    Meningococcal (ACWY) vaccine (1 - Risk 2-dose series) Never done    Meningococcal B vaccine (1 of 4 - Increased Risk) Never done    Varicella vaccine (2 of 2 - 2-dose childhood series) 11/18/2000    Pneumococcal 0-64 years Vaccine (2 - PCV) 02/25/2020    Flu vaccine (1) 12/07/2024 (Originally 8/1/2023)    COVID-19 Vaccine (1) 12/07/2025 (Originally 1989)    Depression Monitoring  02/08/2025    DTaP/Tdap/Td vaccine (3 - Td or Tdap) 09/04/2032    Shingles vaccine (1 of 2) 03/11/2039    Hepatitis C screen  Completed    HIV screen  Completed    Hepatitis A vaccine  Aged Out    HPV vaccine  Aged Out    Polio vaccine  Aged Out    Hepatitis B vaccine  Discontinued       No results found for: \"LABA1C\"          ( goal A1C is < 7)   No components found for: \"LABMICR\"  LDL Calculated (mg/dL)   Date Value   09/21/2016 105       (goal LDL is <100)   AST (U/L)   Date Value   06/30/2023 32     ALT (U/L)   Date Value   06/30/2023 52 (H)     BUN (mg/dL)   Date Value   06/30/2023 11     BP Readings from Last 3 Encounters:   12/15/23 (!) 148/92   09/01/23 130/70   11/15/22 130/80          (goal 120/80)    All Future Testing planned in [facility]  Lab Frequency Next Occurrence               Patient Active Problem List:     Respiratory insufficiency     Traumatic brain injury (HCC)     PTSD (post-traumatic stress disorder)     Suicidal ideation     Depression     Opioid abuse, in remission (HCC)     Carotid aneurysm, left (HCC)     Depression, major, recurrent (HCC)

## 2024-03-15 ENCOUNTER — TELEPHONE (OUTPATIENT)
Dept: FAMILY MEDICINE CLINIC | Age: 34
End: 2024-03-15

## 2024-03-22 NOTE — TELEPHONE ENCOUNTER
Spoke with provider and  and fatemeh is okay to come in for vivitrol.  Spoke with patient and scheduled him for 3/25.

## 2024-03-25 ENCOUNTER — NURSE ONLY (OUTPATIENT)
Dept: FAMILY MEDICINE CLINIC | Age: 34
End: 2024-03-25
Payer: COMMERCIAL

## 2024-03-25 ENCOUNTER — TELEPHONE (OUTPATIENT)
Dept: FAMILY MEDICINE CLINIC | Age: 34
End: 2024-03-25

## 2024-03-25 DIAGNOSIS — F10.11 ALCOHOL ABUSE, IN REMISSION: Primary | ICD-10-CM

## 2024-03-25 LAB
ALCOHOL URINE: NORMAL
AMPHETAMINE SCREEN, URINE: NORMAL
BARBITURATE SCREEN, URINE: NORMAL
BENZODIAZEPINE SCREEN, URINE: NORMAL
BUPRENORPHINE URINE: NORMAL
COCAINE METABOLITE SCREEN URINE: NORMAL
FENTANYL SCREEN, URINE: NORMAL
GABAPENTIN SCREEN, URINE: NORMAL
MDMA URINE: NORMAL
METHADONE SCREEN, URINE: NORMAL
METHAMPHETAMINE, URINE: NORMAL
OPIATE SCREEN URINE: NORMAL
OXYCODONE SCREEN URINE: NORMAL
PHENCYCLIDINE SCREEN URINE: NORMAL
PROPOXYPHENE SCREEN, URINE: NORMAL
SYNTHETIC CANNABINOIDS(K2) SCREEN, URINE: NORMAL
THC SCREEN, URINE: POSITIVE
TRAMADOL SCREEN URINE: NORMAL
TRICYCLIC ANTIDEPRESSANTS, UR: NORMAL

## 2024-03-25 PROCEDURE — 96372 THER/PROPH/DIAG INJ SC/IM: CPT | Performed by: NURSE PRACTITIONER

## 2024-03-25 PROCEDURE — 80305 DRUG TEST PRSMV DIR OPT OBS: CPT | Performed by: NURSE PRACTITIONER

## 2024-03-25 RX ORDER — LISINOPRIL 10 MG/1
TABLET ORAL
Qty: 180 TABLET | Refills: 1 | Status: SHIPPED | OUTPATIENT
Start: 2024-03-25

## 2024-03-25 NOTE — TELEPHONE ENCOUNTER
Patient was in the office today for vivitrol injection.  Patient states he has not slept in 4 days.  States he has tried 5 or 10mg of melatonin, benadryl, and hydroxyzine. States none of these helped.   Please advise.

## 2024-03-25 NOTE — TELEPHONE ENCOUNTER
Per Vida, if patient is not able to wait until Thursday to discuss this then he needs to go to ER.  Spoke with patient and informed him he needs an appt to discuss this. Explained that if he is not able to wait until his next appt then he needs to go to ER.  Patient verbalized understanding.

## 2024-03-28 ENCOUNTER — TELEMEDICINE (OUTPATIENT)
Dept: FAMILY MEDICINE CLINIC | Age: 34
End: 2024-03-28
Payer: COMMERCIAL

## 2024-03-28 DIAGNOSIS — Z13.29 SCREENING FOR THYROID DISORDER: ICD-10-CM

## 2024-03-28 DIAGNOSIS — S06.9X9S TRAUMATIC BRAIN INJURY WITH LOSS OF CONSCIOUSNESS, SEQUELA (HCC): ICD-10-CM

## 2024-03-28 DIAGNOSIS — R68.89 COLD SWEAT: ICD-10-CM

## 2024-03-28 DIAGNOSIS — I72.0 CAROTID ANEURYSM, LEFT (HCC): Primary | ICD-10-CM

## 2024-03-28 DIAGNOSIS — Z13.21 ENCOUNTER FOR VITAMIN DEFICIENCY SCREENING: ICD-10-CM

## 2024-03-28 PROCEDURE — G8427 DOCREV CUR MEDS BY ELIG CLIN: HCPCS | Performed by: NURSE PRACTITIONER

## 2024-03-28 PROCEDURE — 81002 URINALYSIS NONAUTO W/O SCOPE: CPT | Performed by: NURSE PRACTITIONER

## 2024-03-28 PROCEDURE — 99214 OFFICE O/P EST MOD 30 MIN: CPT | Performed by: NURSE PRACTITIONER

## 2024-03-28 SDOH — ECONOMIC STABILITY: FOOD INSECURITY: WITHIN THE PAST 12 MONTHS, YOU WORRIED THAT YOUR FOOD WOULD RUN OUT BEFORE YOU GOT MONEY TO BUY MORE.: NEVER TRUE

## 2024-03-28 SDOH — ECONOMIC STABILITY: INCOME INSECURITY: HOW HARD IS IT FOR YOU TO PAY FOR THE VERY BASICS LIKE FOOD, HOUSING, MEDICAL CARE, AND HEATING?: SOMEWHAT HARD

## 2024-03-28 SDOH — ECONOMIC STABILITY: FOOD INSECURITY: WITHIN THE PAST 12 MONTHS, THE FOOD YOU BOUGHT JUST DIDN'T LAST AND YOU DIDN'T HAVE MONEY TO GET MORE.: NEVER TRUE
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
## 2024-04-01 ENCOUNTER — HOSPITAL ENCOUNTER (OUTPATIENT)
Age: 34
Setting detail: SPECIMEN
Discharge: HOME | End: 2024-04-01

## 2024-04-01 DIAGNOSIS — R68.89 COLD SWEAT: ICD-10-CM

## 2024-04-01 DIAGNOSIS — Z13.21 ENCOUNTER FOR VITAMIN DEFICIENCY SCREENING: ICD-10-CM

## 2024-04-01 DIAGNOSIS — Z13.29 SCREENING FOR THYROID DISORDER: ICD-10-CM

## 2024-04-01 LAB
25(OH)D3 SERPL-MCNC: 27.5 NG/ML (ref 30–100)
ALBUMIN SERPL-MCNC: 4.5 G/DL (ref 3.5–5.2)
ALBUMIN/GLOB SERPL: 2 {RATIO} (ref 1–2.5)
ALP SERPL-CCNC: 167 U/L (ref 40–129)
ALT SERPL-CCNC: 35 U/L (ref 10–50)
ANION GAP SERPL CALCULATED.3IONS-SCNC: 12 MMOL/L (ref 9–16)
AST SERPL-CCNC: 28 U/L (ref 10–50)
BILIRUB SERPL-MCNC: 0.2 MG/DL (ref 0–1.2)
BUN SERPL-MCNC: 12 MG/DL (ref 6–20)
CALCIUM SERPL-MCNC: 9.6 MG/DL (ref 8.6–10.4)
CHLORIDE SERPL-SCNC: 105 MMOL/L (ref 98–107)
CO2 SERPL-SCNC: 24 MMOL/L (ref 20–31)
CREAT SERPL-MCNC: 0.6 MG/DL (ref 0.7–1.2)
ERYTHROCYTE [DISTWIDTH] IN BLOOD BY AUTOMATED COUNT: 13.9 % (ref 11.8–14.4)
GFR SERPL CREATININE-BSD FRML MDRD: >90 ML/MIN/1.73M2
GLUCOSE SERPL-MCNC: 102 MG/DL (ref 74–99)
HCT VFR BLD AUTO: 47.4 % (ref 40.7–50.3)
HGB BLD-MCNC: 15.6 G/DL (ref 13–17)
MCH RBC QN AUTO: 29.1 PG (ref 25.2–33.5)
MCHC RBC AUTO-ENTMCNC: 32.9 G/DL (ref 28.4–34.8)
MCV RBC AUTO: 88.3 FL (ref 82.6–102.9)
NRBC BLD-RTO: 0 PER 100 WBC
PLATELET # BLD AUTO: 681 K/UL (ref 138–453)
PMV BLD AUTO: 10 FL (ref 8.1–13.5)
POTASSIUM SERPL-SCNC: 4.2 MMOL/L (ref 3.7–5.3)
PROT SERPL-MCNC: 7.5 G/DL (ref 6.6–8.7)
RBC # BLD AUTO: 5.37 M/UL (ref 4.21–5.77)
SODIUM SERPL-SCNC: 141 MMOL/L (ref 136–145)
TSH SERPL DL<=0.05 MIU/L-ACNC: <0.01 UIU/ML (ref 0.27–4.2)
WBC OTHER # BLD: 10.5 K/UL (ref 3.5–11.3)

## 2024-04-02 DIAGNOSIS — R79.89 LOW TSH LEVEL: Primary | ICD-10-CM

## 2024-04-02 DIAGNOSIS — R79.89 LOW TSH LEVEL: ICD-10-CM

## 2024-04-02 LAB — T4 FREE SERPL-MCNC: 2.2 NG/DL (ref 0.92–1.68)

## 2024-04-03 DIAGNOSIS — F10.11 ALCOHOL ABUSE, IN REMISSION: ICD-10-CM

## 2024-04-03 NOTE — TELEPHONE ENCOUNTER
Last visit: 3/28/24      Next Visit Date:  Future Appointments   Date Time Provider Department Center   4/22/2024  3:00 PM SCHEDULE, [facility]   4/29/2024  1:45 PM Vida Serna, APRN - CNP [facility]       Health Maintenance   Topic Date Due    Hib vaccine (1 of 1 - Risk 1-dose series) Never done    Meningococcal (ACWY) vaccine (1 - Risk 2-dose series) Never done    Meningococcal B vaccine (1 of 4 - Increased Risk) Never done    Varicella vaccine (2 of 2 - 2-dose childhood series) 11/18/2000    Pneumococcal 0-64 years Vaccine (2 of 2 - PCV) 02/25/2020    Flu vaccine (Season Ended) 12/07/2024 (Originally 8/1/2024)    COVID-19 Vaccine (1) 12/07/2025 (Originally 1989)    Depression Monitoring  02/08/2025    DTaP/Tdap/Td vaccine (3 - Td or Tdap) 09/04/2032    Shingles vaccine (1 of 2) 03/11/2039    Hepatitis C screen  Completed    HIV screen  Completed    Hepatitis A vaccine  Aged Out    HPV vaccine  Aged Out    Polio vaccine  Aged Out    Hepatitis B vaccine  Discontinued       No results found for: \"LABA1C\"          ( goal A1C is < 7)   No components found for: \"LABMICR\"  LDL Calculated (mg/dL)   Date Value   09/21/2016 105       (goal LDL is <100)   AST (U/L)   Date Value   04/01/2024 28     ALT (U/L)   Date Value   04/01/2024 35     BUN (mg/dL)   Date Value   04/01/2024 12     BP Readings from Last 3 Encounters:   12/15/23 (!) 148/92   09/01/23 130/70   11/15/22 130/80          (goal 120/80)    All Future Testing planned in [facility]  Lab Frequency Next Occurrence   CTA NECK W CONTRAST Once 03/28/2024               Patient Active Problem List:     Respiratory insufficiency     Traumatic brain injury (HCC)     PTSD (post-traumatic stress disorder)     Suicidal ideation     Depression     Opioid abuse, in remission (HCC)     Carotid aneurysm, left (HCC)     Depression, major, recurrent (HCC)

## 2024-04-04 DIAGNOSIS — R79.89 LOW TSH LEVEL: Primary | ICD-10-CM

## 2024-04-04 LAB
6-ACETYLMORPHINE, UR: NOT DETECTED
7-AMINOCLONAZEPAM, URINE: NOT DETECTED
ALPHA-OH-ALPRAZ, URINE: NOT DETECTED
ALPHA-OH-MIDAZOLAM, URINE: NOT DETECTED
ALPRAZOLAM, URINE: NOT DETECTED
AMPHETAMINES, URINE: NOT DETECTED
BARBITURATES, URINE: NEGATIVE
BENZOYLECGONINE, UR: NEGATIVE
BUPRENORPHINE URINE: NOT DETECTED
CARISOPRODOL, UR: NEGATIVE
CLONAZEPAM, URINE: NOT DETECTED
CODEINE, URINE: NOT DETECTED
CREAT UR-MCNC: 89.3 MG/DL (ref 20–400)
DIAZEPAM, URINE: NOT DETECTED
DRUGS EXPECTED, UR: NORMAL
EER HI RES INTERP UR: NORMAL
ETHYL GLUCURONIDE UR: NEGATIVE
FENTANYL URINE: NOT DETECTED
GABAPENTIN: PRESENT
HYDROCODONE, URINE: NOT DETECTED
HYDROMORPHONE, URINE: NOT DETECTED
LORAZEPAM, URINE: NOT DETECTED
MARIJUANA METAB, UR: NORMAL
MDA, UR: NOT DETECTED
MDEA, EVE, UR: NOT DETECTED
MDMA URINE: NOT DETECTED
MEPERIDINE METAB, UR: NOT DETECTED
METHADONE, URINE: NEGATIVE
METHAMPHETAMINE, URINE: NOT DETECTED
METHYLPHENIDATE: NOT DETECTED
MIDAZOLAM, URINE: NOT DETECTED
MORPHINE, OPI1M: NOT DETECTED
NALOXONE URINE: NOT DETECTED
NORBUPRENORPHINE, URINE: NOT DETECTED
NORDIAZEPAM, URINE: NOT DETECTED
NORFENTANYL, URINE: NOT DETECTED
NORHYDROCODONE, URINE: NOT DETECTED
NOROXYCODONE, URINE: NOT DETECTED
NOROXYMORPHONE, URINE: NOT DETECTED
OXAZEPAM, URINE: NOT DETECTED
OXYCODONE URINE: NOT DETECTED
OXYMORPHONE, URINE: NOT DETECTED
PAIN MANAGEMENT DRUG PANEL INTERP, URINE: NORMAL
PAIN MGT DRUG PANEL, HI RES, UR: NORMAL
PCP,URINE: NEGATIVE
PHENTERMINE, UR: NOT DETECTED
PREGABALIN: NOT DETECTED
TAPENTADOL, URINE: NOT DETECTED
TAPENTADOL-O-SULFATE, URINE: NOT DETECTED
TEMAZEPAM, URINE: NOT DETECTED
THYROGLOBULIN AB: 16 IU/ML (ref 0–40)
THYROPEROXIDASE AB SERPL IA-ACNC: <4 IU/ML (ref 0–25)
TRAMADOL, URINE: NEGATIVE
ZOLPIDEM METABOLITE (ZCA), URINE: NOT DETECTED
ZOLPIDEM, URINE: NOT DETECTED

## 2024-04-09 ENCOUNTER — TELEPHONE (OUTPATIENT)
Dept: FAMILY MEDICINE CLINIC | Age: 34
End: 2024-04-09

## 2024-04-09 DIAGNOSIS — E05.90 HYPERTHYROIDISM: Primary | ICD-10-CM

## 2024-04-09 RX ORDER — PROPRANOLOL HCL 60 MG
60 CAPSULE, EXTENDED RELEASE 24HR ORAL DAILY
Qty: 30 CAPSULE | Refills: 3 | Status: SHIPPED | OUTPATIENT
Start: 2024-04-09

## 2024-04-09 NOTE — TELEPHONE ENCOUNTER
Per provider, patient needs to see endo for hyperthyroidism.  Referral placed.    Also, provider has started him on propranolol.    Patient informed, verbally understood.

## 2024-04-19 ENCOUNTER — HOSPITAL ENCOUNTER (OUTPATIENT)
Dept: CT IMAGING | Facility: CLINIC | Age: 34
End: 2024-04-19
Payer: COMMERCIAL

## 2024-04-19 DIAGNOSIS — I72.0 CAROTID ANEURYSM, LEFT (HCC): ICD-10-CM

## 2024-04-19 PROCEDURE — 6360000004 HC RX CONTRAST MEDICATION: Performed by: NURSE PRACTITIONER

## 2024-04-19 PROCEDURE — 70498 CT ANGIOGRAPHY NECK: CPT

## 2024-04-19 PROCEDURE — 2580000003 HC RX 258: Performed by: NURSE PRACTITIONER

## 2024-04-19 RX ORDER — 0.9 % SODIUM CHLORIDE 0.9 %
70 INTRAVENOUS SOLUTION INTRAVENOUS ONCE
Status: COMPLETED | OUTPATIENT
Start: 2024-04-19 | End: 2024-04-19

## 2024-04-19 RX ORDER — SODIUM CHLORIDE 0.9 % (FLUSH) 0.9 %
10 SYRINGE (ML) INJECTION PRN
Status: DISCONTINUED | OUTPATIENT
Start: 2024-04-19 | End: 2024-04-22 | Stop reason: HOSPADM

## 2024-04-19 RX ADMIN — SODIUM CHLORIDE, PRESERVATIVE FREE 10 ML: 5 INJECTION INTRAVENOUS at 10:10

## 2024-04-19 RX ADMIN — SODIUM CHLORIDE 70 ML: 9 INJECTION, SOLUTION INTRAVENOUS at 10:10

## 2024-04-19 RX ADMIN — IOPAMIDOL 75 ML: 755 INJECTION, SOLUTION INTRAVENOUS at 10:09

## 2024-04-24 DIAGNOSIS — F90.2 ATTENTION DEFICIT HYPERACTIVITY DISORDER (ADHD), COMBINED TYPE: ICD-10-CM

## 2024-04-24 NOTE — TELEPHONE ENCOUNTER
Last visit: 3/28/24  Last Med refill: 3/7/24  Does patient have enough medication for 72 hours: No:     Patient states he did start taking this again. States he stopped for a few days due to sleeping issues.    Next Visit Date:  Future Appointments   Date Time Provider Department Center   5/20/2024  1:45 PM SCHEDULE, [facility]   5/30/2024  3:30 PM Reynaldo Bright MD Neuro Endo [facility]       Health Maintenance   Topic Date Due    Hib vaccine (1 of 1 - Risk 1-dose series) Never done    Meningococcal (ACWY) vaccine (1 - Risk 2-dose series) Never done    Meningococcal B vaccine (1 of 4 - Increased Risk) Never done    Varicella vaccine (2 of 2 - 2-dose childhood series) 11/18/2000    Pneumococcal 0-64 years Vaccine (2 of 2 - PCV) 02/25/2020    Flu vaccine (Season Ended) 12/07/2024 (Originally 8/1/2024)    COVID-19 Vaccine (1) 12/07/2025 (Originally 1989)    Depression Monitoring  02/08/2025    DTaP/Tdap/Td vaccine (3 - Td or Tdap) 09/04/2032    Shingles vaccine (1 of 2) 03/11/2039    Hepatitis C screen  Completed    HIV screen  Completed    Hepatitis A vaccine  Aged Out    HPV vaccine  Aged Out    Polio vaccine  Aged Out    Hepatitis B vaccine  Discontinued       No results found for: \"LABA1C\"          ( goal A1C is < 7)   No components found for: \"LABMICR\"  LDL Calculated (mg/dL)   Date Value   09/21/2016 105       (goal LDL is <100)   AST (U/L)   Date Value   04/01/2024 28     ALT (U/L)   Date Value   04/01/2024 35     BUN (mg/dL)   Date Value   04/01/2024 12     BP Readings from Last 3 Encounters:   12/15/23 (!) 148/92   09/01/23 130/70   11/15/22 130/80          (goal 120/80)    All Future Testing planned in [facility]  Lab Frequency Next Occurrence   PTH, Intact Once 04/04/2024               Patient Active Problem List:     Respiratory insufficiency     Traumatic brain injury (HCC)     PTSD (post-traumatic stress disorder)     Suicidal ideation     Depression     Opioid abuse, in

## 2024-04-25 RX ORDER — DEXTROAMPHETAMINE SACCHARATE, AMPHETAMINE ASPARTATE, DEXTROAMPHETAMINE SULFATE AND AMPHETAMINE SULFATE 5; 5; 5; 5 MG/1; MG/1; MG/1; MG/1
20 TABLET ORAL 2 TIMES DAILY
Qty: 60 TABLET | Refills: 0 | Status: SHIPPED | OUTPATIENT
Start: 2024-04-25 | End: 2024-05-25

## 2024-05-21 RX ORDER — OMEPRAZOLE 20 MG/1
CAPSULE, DELAYED RELEASE ORAL
Qty: 180 CAPSULE | Refills: 1 | Status: SHIPPED | OUTPATIENT
Start: 2024-05-21

## 2024-05-28 DIAGNOSIS — F90.2 ATTENTION DEFICIT HYPERACTIVITY DISORDER (ADHD), COMBINED TYPE: ICD-10-CM

## 2024-05-28 DIAGNOSIS — Z76.0 MEDICATION REFILL: ICD-10-CM

## 2024-05-28 NOTE — TELEPHONE ENCOUNTER
Last visit: 05/01/24  Last Med refill: 03/28/24 &4/25/24  Does patient have enough medication for 72 hours: Yes    Next Visit Date:  06/03/24  Future Appointments   Date Time Provider Department Center   5/30/2024  3:30 PM Reynaldo Bright MD Neuro Endo [facility]   6/3/2024  1:15 PM SCHEDULE, [facility]       Health Maintenance   Topic Date Due    Hib vaccine (1 of 1 - Risk 1-dose series) Never done    Meningococcal (ACWY) vaccine (1 - Risk 2-dose series) Never done    Meningococcal B vaccine (1 of 4 - Increased Risk) Never done    Varicella vaccine (2 of 2 - 2-dose childhood series) 11/18/2000    Pneumococcal 0-64 years Vaccine (2 of 2 - PCV) 02/25/2020    Flu vaccine (Season Ended) 12/07/2024 (Originally 8/1/2024)    COVID-19 Vaccine (1) 12/07/2025 (Originally 1989)    Depression Monitoring  02/08/2025    DTaP/Tdap/Td vaccine (3 - Td or Tdap) 09/04/2032    Shingles vaccine (1 of 2) 03/11/2039    Hepatitis C screen  Completed    HIV screen  Completed    Hepatitis A vaccine  Aged Out    HPV vaccine  Aged Out    Polio vaccine  Aged Out    Hepatitis B vaccine  Discontinued       No results found for: \"LABA1C\"          ( goal A1C is < 7)   No components found for: \"LABMICR\"  No components found for: \"LDLCHOLESTEROL\", \"LDLCALC\"    (goal LDL is <100)   AST (U/L)   Date Value   04/01/2024 28     ALT (U/L)   Date Value   04/01/2024 35     BUN (mg/dL)   Date Value   04/01/2024 12     BP Readings from Last 3 Encounters:   12/15/23 (!) 148/92   09/01/23 130/70   11/15/22 130/80          (goal 120/80)    All Future Testing planned in [facility]  Lab Frequency Next Occurrence   PTH, Intact Once 04/04/2024               Patient Active Problem List:     Respiratory insufficiency     Traumatic brain injury (HCC)     PTSD (post-traumatic stress disorder)     Suicidal ideation     Depression     Opioid abuse, in remission (HCC)     Carotid aneurysm, left (HCC)     Depression, major, recurrent (HCC)

## 2024-05-29 RX ORDER — GABAPENTIN 600 MG/1
600 TABLET ORAL 4 TIMES DAILY
Qty: 120 TABLET | Refills: 2 | Status: SHIPPED | OUTPATIENT
Start: 2024-05-29 | End: 2024-08-27

## 2024-05-29 RX ORDER — DEXTROAMPHETAMINE SACCHARATE, AMPHETAMINE ASPARTATE, DEXTROAMPHETAMINE SULFATE AND AMPHETAMINE SULFATE 5; 5; 5; 5 MG/1; MG/1; MG/1; MG/1
1 TABLET ORAL 2 TIMES DAILY
Qty: 60 TABLET | Refills: 0 | Status: SHIPPED | OUTPATIENT
Start: 2024-05-29 | End: 2024-06-28

## 2024-05-30 ENCOUNTER — OFFICE VISIT (OUTPATIENT)
Dept: NEUROLOGY | Age: 34
End: 2024-05-30
Payer: COMMERCIAL

## 2024-05-30 VITALS
WEIGHT: 206 LBS | SYSTOLIC BLOOD PRESSURE: 163 MMHG | HEIGHT: 74 IN | HEART RATE: 70 BPM | DIASTOLIC BLOOD PRESSURE: 90 MMHG | BODY MASS INDEX: 26.44 KG/M2

## 2024-05-30 DIAGNOSIS — I72.0 PSEUDOANEURYSM OF CAROTID ARTERY (HCC): Primary | ICD-10-CM

## 2024-05-30 PROCEDURE — G8417 CALC BMI ABV UP PARAM F/U: HCPCS | Performed by: PSYCHIATRY & NEUROLOGY

## 2024-05-30 PROCEDURE — 99205 OFFICE O/P NEW HI 60 MIN: CPT | Performed by: PSYCHIATRY & NEUROLOGY

## 2024-05-30 PROCEDURE — G8427 DOCREV CUR MEDS BY ELIG CLIN: HCPCS | Performed by: PSYCHIATRY & NEUROLOGY

## 2024-05-30 PROCEDURE — 1036F TOBACCO NON-USER: CPT | Performed by: PSYCHIATRY & NEUROLOGY

## 2024-05-30 RX ORDER — OLANZAPINE 10 MG/1
10 TABLET ORAL NIGHTLY
COMMUNITY
Start: 2024-05-02

## 2024-05-30 ASSESSMENT — ENCOUNTER SYMPTOMS
VOICE CHANGE: 0
COUGH: 0
VOMITING: 0
PHOTOPHOBIA: 0
NAUSEA: 0
SHORTNESS OF BREATH: 0
COLOR CHANGE: 0
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
## 2024-06-20 ENCOUNTER — TELEMEDICINE (OUTPATIENT)
Dept: FAMILY MEDICINE CLINIC | Age: 34
End: 2024-06-20
Payer: COMMERCIAL

## 2024-06-20 DIAGNOSIS — E05.90 HYPERTHYROIDISM: Primary | ICD-10-CM

## 2024-06-20 PROCEDURE — G8427 DOCREV CUR MEDS BY ELIG CLIN: HCPCS | Performed by: NURSE PRACTITIONER

## 2024-06-20 PROCEDURE — 99213 OFFICE O/P EST LOW 20 MIN: CPT | Performed by: NURSE PRACTITIONER

## 2024-06-20 RX ORDER — OMEPRAZOLE 20 MG/1
20 CAPSULE, DELAYED RELEASE ORAL DAILY
Qty: 90 CAPSULE | Refills: 1 | Status: SHIPPED | OUTPATIENT
Start: 2024-06-20

## 2024-06-20 ASSESSMENT — ENCOUNTER SYMPTOMS
SHORTNESS OF BREATH: 0
COUGH: 0

## 2024-06-20 NOTE — PROGRESS NOTES
Patient is present for check up    States he is going to have surgery to have his thyroid removed  
 [] Abnormal -   [x] Mouth/Throat: Mucous membranes are moist    External Ears [x] Normal  [] Abnormal -    Neck: [x] No visualized mass [] Abnormal -     Pulmonary/Chest: [x] Respiratory effort normal   [x] No visualized signs of difficulty breathing or respiratory distress        [] Abnormal -      Musculoskeletal:   [x] Normal gait with no signs of ataxia         [x] Normal range of motion of neck        [] Abnormal -     Neurological:        [x] No Facial Asymmetry (Cranial nerve 7 motor function) (limited exam due to video visit)          [x] No gaze palsy        [] Abnormal -          Skin:        [x] No significant exanthematous lesions or discoloration noted on facial skin         [] Abnormal -            Psychiatric:       [x] Normal Affect [] Abnormal -        [x] No Hallucinations    Other pertinent observable physical exam findings:-           --PHOEBE HELM - CNP

## 2024-06-22 DIAGNOSIS — Z76.0 MEDICATION REFILL: ICD-10-CM

## 2024-06-24 RX ORDER — SERTRALINE HYDROCHLORIDE 100 MG/1
TABLET, FILM COATED ORAL
Qty: 180 TABLET | Refills: 1 | Status: SHIPPED | OUTPATIENT
Start: 2024-06-24

## 2024-06-24 NOTE — TELEPHONE ENCOUNTER
Last visit: 6/20/24  Last Med refill: 12/28/23  Does patient have enough medication for 72 hours: Yes    Next Visit Date:  Future Appointments   Date Time Provider Department Center   11/14/2024  1:00 PM Reynaldo Bright MD Neuro Endo [facility]       Health Maintenance   Topic Date Due    Hib vaccine (1 of 1 - Risk 1-dose series) Never done    Meningococcal (ACWY) vaccine (1 - Risk 2-dose series) Never done    Meningococcal B vaccine (1 of 4 - Increased Risk) Never done    Varicella vaccine (2 of 2 - 2-dose childhood series) 11/18/2000    Pneumococcal 0-64 years Vaccine (2 of 2 - PCV) 02/25/2020    Diabetes screen  Never done    Flu vaccine (Season Ended) 12/07/2024 (Originally 8/1/2024)    COVID-19 Vaccine (1) 12/07/2025 (Originally 1989)    Depression Monitoring  02/08/2025    DTaP/Tdap/Td vaccine (3 - Td or Tdap) 09/04/2032    Shingles vaccine (1 of 2) 03/11/2039    Hepatitis C screen  Completed    HIV screen  Completed    Hepatitis A vaccine  Aged Out    HPV vaccine  Aged Out    Polio vaccine  Aged Out    Hepatitis B vaccine  Discontinued       No results found for: \"LABA1C\"          ( goal A1C is < 7)   No components found for: \"LABMICR\"  No components found for: \"LDLCHOLESTEROL\", \"LDLCALC\"    (goal LDL is <100)   AST (U/L)   Date Value   04/01/2024 28     ALT (U/L)   Date Value   04/01/2024 35     BUN (mg/dL)   Date Value   04/01/2024 12     BP Readings from Last 3 Encounters:   05/30/24 (!) 163/90   12/15/23 (!) 148/92   09/01/23 130/70          (goal 120/80)    All Future Testing planned in [facility]  Lab Frequency Next Occurrence   PTH, Intact Once 04/04/2024               Patient Active Problem List:     Respiratory insufficiency     Traumatic brain injury (HCC)     PTSD (post-traumatic stress disorder)     Suicidal ideation     Depression     Opioid abuse, in remission (HCC)     Left Pseudoaneurysm of carotid artery (HCC)     Depression, major, recurrent (HCC)

## 2024-06-26 ENCOUNTER — TELEPHONE (OUTPATIENT)
Dept: FAMILY MEDICINE CLINIC | Age: 34
End: 2024-06-26

## 2024-06-26 DIAGNOSIS — F90.2 ATTENTION DEFICIT HYPERACTIVITY DISORDER (ADHD), COMBINED TYPE: ICD-10-CM

## 2024-06-26 NOTE — TELEPHONE ENCOUNTER
PT called and states [facility] is closing. Pt started to tell me where he is switching to but he hung up.

## 2024-06-27 RX ORDER — DEXTROAMPHETAMINE SACCHARATE, AMPHETAMINE ASPARTATE, DEXTROAMPHETAMINE SULFATE AND AMPHETAMINE SULFATE 5; 5; 5; 5 MG/1; MG/1; MG/1; MG/1
20 TABLET ORAL 2 TIMES DAILY
Qty: 60 TABLET | Refills: 0 | Status: SHIPPED | OUTPATIENT
Start: 2024-07-26 | End: 2024-08-25

## 2024-06-27 RX ORDER — DEXTROAMPHETAMINE SACCHARATE, AMPHETAMINE ASPARTATE, DEXTROAMPHETAMINE SULFATE AND AMPHETAMINE SULFATE 5; 5; 5; 5 MG/1; MG/1; MG/1; MG/1
1 TABLET ORAL 2 TIMES DAILY
Qty: 60 TABLET | Refills: 0 | Status: SHIPPED | OUTPATIENT
Start: 2024-06-27 | End: 2024-07-27

## 2024-07-26 DIAGNOSIS — Z76.0 MEDICATION REFILL: ICD-10-CM

## 2024-07-26 RX ORDER — SERTRALINE HYDROCHLORIDE 100 MG/1
TABLET, FILM COATED ORAL
Qty: 180 TABLET | Refills: 1 | Status: SHIPPED | OUTPATIENT
Start: 2024-07-26

## 2024-07-26 RX ORDER — LISINOPRIL 10 MG/1
TABLET ORAL
Qty: 180 TABLET | Refills: 1 | Status: SHIPPED | OUTPATIENT
Start: 2024-07-26

## 2024-07-26 RX ORDER — PROPRANOLOL HCL 60 MG
60 CAPSULE, EXTENDED RELEASE 24HR ORAL DAILY
Qty: 30 CAPSULE | Refills: 3 | Status: SHIPPED | OUTPATIENT
Start: 2024-07-26

## 2024-07-26 RX ORDER — OMEPRAZOLE 20 MG/1
20 CAPSULE, DELAYED RELEASE ORAL DAILY
Qty: 90 CAPSULE | Refills: 1 | Status: SHIPPED | OUTPATIENT
Start: 2024-07-26

## 2024-07-26 RX ORDER — GABAPENTIN 600 MG/1
600 TABLET ORAL 4 TIMES DAILY
Qty: 120 TABLET | Refills: 2 | Status: SHIPPED | OUTPATIENT
Start: 2024-07-26 | End: 2024-10-24

## 2024-07-26 NOTE — TELEPHONE ENCOUNTER
Patient contacted office to inform of pharmacy change. [facility] [address]

## 2024-08-30 ENCOUNTER — OFFICE VISIT (OUTPATIENT)
Dept: FAMILY MEDICINE CLINIC | Age: 34
End: 2024-08-30

## 2024-08-30 VITALS
HEART RATE: 71 BPM | DIASTOLIC BLOOD PRESSURE: 80 MMHG | WEIGHT: 230 LBS | OXYGEN SATURATION: 98 % | BODY MASS INDEX: 29.53 KG/M2 | SYSTOLIC BLOOD PRESSURE: 124 MMHG

## 2024-08-30 DIAGNOSIS — F90.2 ATTENTION DEFICIT HYPERACTIVITY DISORDER (ADHD), COMBINED TYPE: ICD-10-CM

## 2024-08-30 DIAGNOSIS — Z76.0 MEDICATION REFILL: ICD-10-CM

## 2024-08-30 DIAGNOSIS — F10.11 ALCOHOL ABUSE, IN REMISSION: Primary | ICD-10-CM

## 2024-08-30 LAB
ALCOHOL URINE: ABNORMAL
AMPHETAMINE SCREEN URINE: POSITIVE
BARBITURATE SCREEN URINE: ABNORMAL
BENZODIAZEPINE SCREEN, URINE: ABNORMAL
BUPRENORPHINE URINE: ABNORMAL
COCAINE METABOLITE SCREEN URINE: ABNORMAL
FENTANYL SCREEN, URINE: ABNORMAL
GABAPENTIN SCREEN, URINE: ABNORMAL
MDMA, URINE: ABNORMAL
METHADONE SCREEN, URINE: ABNORMAL
METHAMPHETAMINE, URINE: ABNORMAL
OPIATE SCREEN URINE: ABNORMAL
OXYCODONE SCREEN URINE: ABNORMAL
PHENCYCLIDINE SCREEN URINE: ABNORMAL
PROPOXYPHENE SCREEN, URINE: ABNORMAL
SYNTHETIC CANNABINOIDS(K2) SCREEN, URINE: ABNORMAL
THC SCREEN, URINE: POSITIVE
TRAMADOL SCREEN URINE: ABNORMAL
TRICYCLIC ANTIDEPRESSANTS, UR: ABNORMAL

## 2024-08-30 RX ORDER — DEXTROAMPHETAMINE SACCHARATE, AMPHETAMINE ASPARTATE, DEXTROAMPHETAMINE SULFATE AND AMPHETAMINE SULFATE 5; 5; 5; 5 MG/1; MG/1; MG/1; MG/1
20 TABLET ORAL 2 TIMES DAILY
Qty: 60 TABLET | Refills: 0 | Status: SHIPPED | OUTPATIENT
Start: 2024-10-28 | End: 2024-11-27

## 2024-08-30 RX ORDER — LEVOTHYROXINE SODIUM 175 UG/1
175 TABLET ORAL DAILY
COMMUNITY
Start: 2024-07-23 | End: 2024-10-21

## 2024-08-30 RX ORDER — FLUOCINOLONE ACETONIDE 0.1 MG/ML
SOLUTION TOPICAL
Qty: 60 ML | Refills: 1 | Status: CANCELLED | OUTPATIENT
Start: 2024-08-30

## 2024-08-30 RX ORDER — TRAZODONE HYDROCHLORIDE 100 MG/1
TABLET ORAL
COMMUNITY
Start: 2024-08-07

## 2024-08-30 RX ORDER — CLONAZEPAM 1 MG/1
TABLET ORAL
COMMUNITY
Start: 2024-08-26

## 2024-08-30 RX ORDER — DEXTROAMPHETAMINE SACCHARATE, AMPHETAMINE ASPARTATE, DEXTROAMPHETAMINE SULFATE AND AMPHETAMINE SULFATE 5; 5; 5; 5 MG/1; MG/1; MG/1; MG/1
1 TABLET ORAL 2 TIMES DAILY
Qty: 60 TABLET | Refills: 0 | Status: SHIPPED | OUTPATIENT
Start: 2024-08-30 | End: 2024-09-29

## 2024-08-30 RX ORDER — DEXTROAMPHETAMINE SACCHARATE, AMPHETAMINE ASPARTATE, DEXTROAMPHETAMINE SULFATE AND AMPHETAMINE SULFATE 5; 5; 5; 5 MG/1; MG/1; MG/1; MG/1
20 TABLET ORAL 2 TIMES DAILY
Qty: 60 TABLET | Refills: 0 | Status: SHIPPED | OUTPATIENT
Start: 2024-09-27 | End: 2024-10-27

## 2024-09-13 DIAGNOSIS — F10.11 ALCOHOL ABUSE, IN REMISSION: ICD-10-CM

## 2024-09-19 ENCOUNTER — TELEMEDICINE (OUTPATIENT)
Dept: FAMILY MEDICINE CLINIC | Age: 34
End: 2024-09-19
Payer: COMMERCIAL

## 2024-09-19 DIAGNOSIS — Z76.0 MEDICATION REFILL: ICD-10-CM

## 2024-09-19 DIAGNOSIS — I10 PRIMARY HYPERTENSION: Primary | ICD-10-CM

## 2024-09-19 PROCEDURE — 99213 OFFICE O/P EST LOW 20 MIN: CPT | Performed by: NURSE PRACTITIONER

## 2024-09-19 PROCEDURE — G8427 DOCREV CUR MEDS BY ELIG CLIN: HCPCS | Performed by: NURSE PRACTITIONER

## 2024-09-19 RX ORDER — FLUOCINOLONE ACETONIDE 0.1 MG/ML
SOLUTION TOPICAL
Qty: 60 ML | Refills: 1 | Status: SHIPPED | OUTPATIENT
Start: 2024-09-19

## 2024-09-19 RX ORDER — QUETIAPINE FUMARATE 100 MG/1
100 TABLET, FILM COATED ORAL NIGHTLY
COMMUNITY
Start: 2024-09-03

## 2024-09-19 ASSESSMENT — ENCOUNTER SYMPTOMS
SHORTNESS OF BREATH: 0
COUGH: 0

## 2024-09-30 ENCOUNTER — NURSE ONLY (OUTPATIENT)
Dept: FAMILY MEDICINE CLINIC | Age: 34
End: 2024-09-30
Payer: COMMERCIAL

## 2024-09-30 DIAGNOSIS — F10.11 ALCOHOL ABUSE, IN REMISSION: Primary | ICD-10-CM

## 2024-09-30 LAB
ALCOHOL URINE: ABNORMAL
AMPHETAMINE SCREEN URINE: POSITIVE
BARBITURATE SCREEN URINE: ABNORMAL
BENZODIAZEPINE SCREEN, URINE: ABNORMAL
BUPRENORPHINE URINE: ABNORMAL
COCAINE METABOLITE SCREEN URINE: ABNORMAL
FENTANYL SCREEN, URINE: ABNORMAL
GABAPENTIN SCREEN, URINE: ABNORMAL
MDMA, URINE: ABNORMAL
METHADONE SCREEN, URINE: ABNORMAL
METHAMPHETAMINE, URINE: ABNORMAL
OPIATE SCREEN URINE: ABNORMAL
OXYCODONE SCREEN URINE: ABNORMAL
PHENCYCLIDINE SCREEN URINE: ABNORMAL
PROPOXYPHENE SCREEN, URINE: ABNORMAL
SYNTHETIC CANNABINOIDS(K2) SCREEN, URINE: ABNORMAL
THC SCREEN, URINE: POSITIVE
TRAMADOL SCREEN URINE: ABNORMAL
TRICYCLIC ANTIDEPRESSANTS, UR: POSITIVE

## 2024-09-30 PROCEDURE — 80305 DRUG TEST PRSMV DIR OPT OBS: CPT | Performed by: NURSE PRACTITIONER

## 2024-09-30 NOTE — PROGRESS NOTES
After obtaining consent, and per orders of Vida Serna, injection of vivitrol given in Right vastus lateralis by Xiomara Apodaca MA. Patient instructed to remain in clinic for 20 minutes afterwards, and to report any adverse reaction to me immediately.    Patient supplied medication.

## 2024-11-14 ENCOUNTER — OFFICE VISIT (OUTPATIENT)
Dept: NEUROLOGY | Age: 34
End: 2024-11-14
Payer: COMMERCIAL

## 2024-11-14 VITALS
DIASTOLIC BLOOD PRESSURE: 85 MMHG | BODY MASS INDEX: 28.88 KG/M2 | HEIGHT: 74 IN | WEIGHT: 225 LBS | SYSTOLIC BLOOD PRESSURE: 123 MMHG | HEART RATE: 98 BPM

## 2024-11-14 DIAGNOSIS — I72.0 PSEUDOANEURYSM OF CAROTID ARTERY (HCC): Primary | ICD-10-CM

## 2024-11-14 PROCEDURE — 1036F TOBACCO NON-USER: CPT | Performed by: PSYCHIATRY & NEUROLOGY

## 2024-11-14 PROCEDURE — G8417 CALC BMI ABV UP PARAM F/U: HCPCS | Performed by: PSYCHIATRY & NEUROLOGY

## 2024-11-14 PROCEDURE — G8427 DOCREV CUR MEDS BY ELIG CLIN: HCPCS | Performed by: PSYCHIATRY & NEUROLOGY

## 2024-11-14 PROCEDURE — 99215 OFFICE O/P EST HI 40 MIN: CPT | Performed by: PSYCHIATRY & NEUROLOGY

## 2024-11-14 PROCEDURE — G8484 FLU IMMUNIZE NO ADMIN: HCPCS | Performed by: PSYCHIATRY & NEUROLOGY

## 2024-11-14 RX ORDER — ASPIRIN 81 MG/1
81 TABLET ORAL DAILY
Qty: 90 TABLET | Refills: 0 | Status: SHIPPED | OUTPATIENT
Start: 2024-11-14

## 2024-11-14 RX ORDER — CLOPIDOGREL BISULFATE 75 MG/1
75 TABLET ORAL DAILY
Qty: 30 TABLET | Refills: 0 | Status: SHIPPED | OUTPATIENT
Start: 2024-11-14
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
## 2024-11-16 ASSESSMENT — ENCOUNTER SYMPTOMS
COUGH: 0
VOMITING: 0
VOICE CHANGE: 0
PHOTOPHOBIA: 0
NAUSEA: 0
COLOR CHANGE: 0
SHORTNESS OF BREATH: 0

## 2024-11-25 DIAGNOSIS — F90.2 ATTENTION DEFICIT HYPERACTIVITY DISORDER (ADHD), COMBINED TYPE: ICD-10-CM

## 2024-11-25 RX ORDER — DEXTROAMPHETAMINE SACCHARATE, AMPHETAMINE ASPARTATE, DEXTROAMPHETAMINE SULFATE AND AMPHETAMINE SULFATE 5; 5; 5; 5 MG/1; MG/1; MG/1; MG/1
1 TABLET ORAL 2 TIMES DAILY
Qty: 60 TABLET | Refills: 0 | Status: SHIPPED | OUTPATIENT
Start: 2024-11-25 | End: 2024-12-25

## 2024-11-25 NOTE — TELEPHONE ENCOUNTER
Last visit: 09/19/2024 VV  Last Med refill: 08/30/2024  Does patient have enough medication for 72 hours: Yes    Next Visit Date:  Future Appointments   Date Time Provider Department Center   12/2/2024  1:00 PM [facility] BI-PLANE RM 1 [facility] Radiolog   12/6/2024 10:45 AM Vida Serna, APRN - CNP [facility] DEP       Health Maintenance   Topic Date Due    Hib vaccine (1 of 1 - Risk 1-dose series) Never done    Meningococcal (ACWY) vaccine (1 - Risk 2-dose series) Never done    Meningococcal B vaccine (1 of 4 - Increased Risk) Never done    Varicella vaccine (2 of 2 - 2-dose childhood series) 11/18/2000    Pneumococcal 0-64 years Vaccine (2 of 2 - PCV) 02/25/2020    Diabetes screen  Never done    COVID-19 Vaccine (1 - 2023-24 season) Never done    Flu vaccine (1) 08/30/2025 (Originally 8/1/2024)    Depression Monitoring  02/08/2025    DTaP/Tdap/Td vaccine (3 - Td or Tdap) 09/04/2032    Shingles vaccine (1 of 2) 03/11/2039    Hepatitis C screen  Completed    HIV screen  Completed    Hepatitis A vaccine  Aged Out    HPV vaccine  Aged Out    Polio vaccine  Aged Out    Hepatitis B vaccine  Discontinued       No results found for: \"LABA1C\"          ( goal A1C is < 7)   No components found for: \"LABMICR\"  No components found for: \"LDLCHOLESTEROL\", \"LDLCALC\"    (goal LDL is <100)   AST (U/L)   Date Value   04/01/2024 28     ALT (U/L)   Date Value   04/01/2024 35     BUN (mg/dL)   Date Value   04/01/2024 12     BP Readings from Last 3 Encounters:   11/14/24 123/85   08/30/24 124/80   05/30/24 (!) 163/90          (goal 120/80)    All Future Testing planned in [facility]  Lab Frequency Next Occurrence   PTH, Intact Once 04/04/2024               Patient Active Problem List:     Respiratory insufficiency     Traumatic brain injury     PTSD (post-traumatic stress disorder)     Suicidal ideation     Depression     Opioid abuse, in remission (HCC)     Left Pseudoaneurysm of carotid artery (HCC)     Depression,

## 2024-12-02 ENCOUNTER — HOSPITAL ENCOUNTER (OUTPATIENT)
Dept: INTERVENTIONAL RADIOLOGY/VASCULAR | Age: 34
Setting detail: OBSERVATION
Discharge: HOME | End: 2024-12-03
Attending: STUDENT IN AN ORGANIZED HEALTH CARE EDUCATION/TRAINING PROGRAM | Admitting: STUDENT IN AN ORGANIZED HEALTH CARE EDUCATION/TRAINING PROGRAM
Payer: COMMERCIAL

## 2024-12-02 ENCOUNTER — ANESTHESIA EVENT (OUTPATIENT)
Dept: INTERVENTIONAL RADIOLOGY/VASCULAR | Age: 34
End: 2024-12-02
Payer: COMMERCIAL

## 2024-12-02 ENCOUNTER — ANESTHESIA (OUTPATIENT)
Dept: INTERVENTIONAL RADIOLOGY/VASCULAR | Age: 34
End: 2024-12-02
Payer: COMMERCIAL

## 2024-12-02 DIAGNOSIS — I72.0 PSEUDOANEURYSM OF CAROTID ARTERY (HCC): ICD-10-CM

## 2024-12-02 DIAGNOSIS — I72.9 PSEUDOANEURYSM (HCC): ICD-10-CM

## 2024-12-02 PROBLEM — Z95.820 STATUS POST ANGIOPLASTY WITH STENT: Status: ACTIVE | Noted: 2024-12-02

## 2024-12-02 LAB
ABO + RH BLD: NORMAL
ACT BLD: 136 SEC (ref 79–149)
ACT BLD: 224 SEC (ref 79–149)
ACT BLD: 236 SEC (ref 79–149)
ACT BLD: 257 SEC (ref 79–149)
ARM BAND NUMBER: NORMAL
BLOOD BANK SAMPLE EXPIRATION: NORMAL
BLOOD GROUP ANTIBODIES SERPL: NEGATIVE
BUN BLD-MCNC: 15 MG/DL (ref 8–26)
CA-I BLD-SCNC: 1.22 MMOL/L (ref 1.15–1.33)
CHLORIDE BLD-SCNC: 104 MMOL/L (ref 98–107)
CLOSURE TME COLL+ADP BLD: 120 SEC (ref 67–112)
CO2 BLD CALC-SCNC: 29 MMOL/L (ref 22–30)
COLLAGEN EPINEPHRINE TIME: 125 SEC (ref 85–172)
EGFR, POC: >90 ML/MIN/1.73M2
GLUCOSE BLD-MCNC: 77 MG/DL (ref 74–100)
HCO3 VENOUS: 29 MMOL/L (ref 22–29)
HCT VFR BLD AUTO: 51 % (ref 41–53)
O2 SAT, VEN: 67.2 % (ref 60–85)
PCO2 VENOUS: 49.8 MM HG (ref 41–51)
PH VENOUS: 7.37 (ref 7.32–7.43)
PLATELET FUNCTION INTERP: ABNORMAL
PO2 VENOUS: 36.5 MM HG (ref 30–50)
POC ANION GAP: 8 MMOL/L (ref 7–16)
POC CREATININE: 0.8 MG/DL (ref 0.51–1.19)
POC HEMOGLOBIN (CALC): 17.4 G/DL (ref 13.5–17.5)
POC LACTIC ACID: 1.3 MMOL/L (ref 0.56–1.39)
POSITIVE BASE EXCESS, VEN: 2.5 MMOL/L (ref 0–3)
POTASSIUM BLD-SCNC: 4.5 MMOL/L (ref 3.5–4.5)
SODIUM BLD-SCNC: 140 MMOL/L (ref 138–146)

## 2024-12-02 PROCEDURE — 3700000000 HC ANESTHESIA ATTENDED CARE

## 2024-12-02 PROCEDURE — 6370000000 HC RX 637 (ALT 250 FOR IP)

## 2024-12-02 PROCEDURE — G0378 HOSPITAL OBSERVATION PER HR: HCPCS

## 2024-12-02 PROCEDURE — 75898 FOLLOW-UP ANGIOGRAPHY: CPT

## 2024-12-02 PROCEDURE — 75898 FOLLOW-UP ANGIOGRAPHY: CPT | Performed by: PSYCHIATRY & NEUROLOGY

## 2024-12-02 PROCEDURE — 86900 BLOOD TYPING SEROLOGIC ABO: CPT

## 2024-12-02 PROCEDURE — 2580000003 HC RX 258

## 2024-12-02 PROCEDURE — 36225 PLACE CATH SUBCLAVIAN ART: CPT

## 2024-12-02 PROCEDURE — 6360000002 HC RX W HCPCS: Performed by: ANESTHESIOLOGY

## 2024-12-02 PROCEDURE — 82947 ASSAY GLUCOSE BLOOD QUANT: CPT

## 2024-12-02 PROCEDURE — 75894 X-RAYS TRANSCATH THERAPY: CPT

## 2024-12-02 PROCEDURE — 7100000001 HC PACU RECOVERY - ADDTL 15 MIN

## 2024-12-02 PROCEDURE — 2580000003 HC RX 258: Performed by: ANESTHESIOLOGY

## 2024-12-02 PROCEDURE — 83605 ASSAY OF LACTIC ACID: CPT

## 2024-12-02 PROCEDURE — G0379 DIRECT REFER HOSPITAL OBSERV: HCPCS

## 2024-12-02 PROCEDURE — C2625 STENT, NON-COR, TEM W/DEL SY: HCPCS

## 2024-12-02 PROCEDURE — 82803 BLOOD GASES ANY COMBINATION: CPT

## 2024-12-02 PROCEDURE — 6360000004 HC RX CONTRAST MEDICATION

## 2024-12-02 PROCEDURE — 75894 X-RAYS TRANSCATH THERAPY: CPT | Performed by: PSYCHIATRY & NEUROLOGY

## 2024-12-02 PROCEDURE — 86850 RBC ANTIBODY SCREEN: CPT

## 2024-12-02 PROCEDURE — 36224 PLACE CATH CAROTD ART: CPT

## 2024-12-02 PROCEDURE — 82565 ASSAY OF CREATININE: CPT

## 2024-12-02 PROCEDURE — 75710 ARTERY X-RAYS ARM/LEG: CPT

## 2024-12-02 PROCEDURE — 2500000003 HC RX 250 WO HCPCS

## 2024-12-02 PROCEDURE — 61626 TCAT PERM OCCLS/EMBOL NONCNS: CPT | Performed by: PSYCHIATRY & NEUROLOGY

## 2024-12-02 PROCEDURE — 3700000001 HC ADD 15 MINUTES (ANESTHESIA)

## 2024-12-02 PROCEDURE — 85014 HEMATOCRIT: CPT

## 2024-12-02 PROCEDURE — 61626 TCAT PERM OCCLS/EMBOL NONCNS: CPT

## 2024-12-02 PROCEDURE — 7100000000 HC PACU RECOVERY - FIRST 15 MIN

## 2024-12-02 PROCEDURE — 6360000002 HC RX W HCPCS

## 2024-12-02 PROCEDURE — 6370000000 HC RX 637 (ALT 250 FOR IP): Performed by: PSYCHIATRY & NEUROLOGY

## 2024-12-02 PROCEDURE — 86901 BLOOD TYPING SEROLOGIC RH(D): CPT

## 2024-12-02 PROCEDURE — 36217 PLACE CATHETER IN ARTERY: CPT | Performed by: PSYCHIATRY & NEUROLOGY

## 2024-12-02 PROCEDURE — 85576 BLOOD PLATELET AGGREGATION: CPT

## 2024-12-02 PROCEDURE — 82330 ASSAY OF CALCIUM: CPT

## 2024-12-02 PROCEDURE — 84520 ASSAY OF UREA NITROGEN: CPT

## 2024-12-02 PROCEDURE — 80051 ELECTROLYTE PANEL: CPT

## 2024-12-02 PROCEDURE — 99255 IP/OBS CONSLTJ NEW/EST HI 80: CPT | Performed by: PSYCHIATRY & NEUROLOGY

## 2024-12-02 PROCEDURE — 85347 COAGULATION TIME ACTIVATED: CPT

## 2024-12-02 RX ORDER — ROCURONIUM BROMIDE 10 MG/ML
INJECTION, SOLUTION INTRAVENOUS
Status: DISCONTINUED | OUTPATIENT
Start: 2024-12-02 | End: 2024-12-02 | Stop reason: SDUPTHER

## 2024-12-02 RX ORDER — FENTANYL CITRATE 50 UG/ML
50 INJECTION, SOLUTION INTRAMUSCULAR; INTRAVENOUS
Status: DISCONTINUED | OUTPATIENT
Start: 2024-12-02 | End: 2024-12-02

## 2024-12-02 RX ORDER — SODIUM CHLORIDE, SODIUM LACTATE, POTASSIUM CHLORIDE, CALCIUM CHLORIDE 600; 310; 30; 20 MG/100ML; MG/100ML; MG/100ML; MG/100ML
INJECTION, SOLUTION INTRAVENOUS
Status: DISCONTINUED | OUTPATIENT
Start: 2024-12-02 | End: 2024-12-02 | Stop reason: SDUPTHER

## 2024-12-02 RX ORDER — MIDAZOLAM HYDROCHLORIDE 2 MG/2ML
2 INJECTION, SOLUTION INTRAMUSCULAR; INTRAVENOUS ONCE
Status: COMPLETED | OUTPATIENT
Start: 2024-12-02 | End: 2024-12-02

## 2024-12-02 RX ORDER — ASPIRIN 81 MG/1
81 TABLET, CHEWABLE ORAL ONCE
Status: COMPLETED | OUTPATIENT
Start: 2024-12-02 | End: 2024-12-02

## 2024-12-02 RX ORDER — MAGNESIUM SULFATE IN WATER 40 MG/ML
2000 INJECTION, SOLUTION INTRAVENOUS PRN
Status: DISCONTINUED | OUTPATIENT
Start: 2024-12-02 | End: 2024-12-02

## 2024-12-02 RX ORDER — ACETAMINOPHEN 650 MG/1
650 SUPPOSITORY RECTAL EVERY 6 HOURS PRN
Status: DISCONTINUED | OUTPATIENT
Start: 2024-12-02 | End: 2024-12-02

## 2024-12-02 RX ORDER — DEXMEDETOMIDINE HYDROCHLORIDE 100 UG/ML
INJECTION, SOLUTION INTRAVENOUS
Status: DISCONTINUED | OUTPATIENT
Start: 2024-12-02 | End: 2024-12-02 | Stop reason: SDUPTHER

## 2024-12-02 RX ORDER — DEXMEDETOMIDINE HYDROCHLORIDE 4 UG/ML
.1-1.5 INJECTION, SOLUTION INTRAVENOUS CONTINUOUS
Status: DISCONTINUED | OUTPATIENT
Start: 2024-12-02 | End: 2024-12-02

## 2024-12-02 RX ORDER — IODIXANOL 270 MG/ML
100 INJECTION, SOLUTION INTRAVASCULAR
Status: COMPLETED | OUTPATIENT
Start: 2024-12-02 | End: 2024-12-02

## 2024-12-02 RX ORDER — ONDANSETRON 2 MG/ML
INJECTION INTRAMUSCULAR; INTRAVENOUS
Status: DISCONTINUED | OUTPATIENT
Start: 2024-12-02 | End: 2024-12-02 | Stop reason: SDUPTHER

## 2024-12-02 RX ORDER — LIDOCAINE HYDROCHLORIDE 10 MG/ML
1 INJECTION, SOLUTION EPIDURAL; INFILTRATION; INTRACAUDAL; PERINEURAL
Status: DISCONTINUED | OUTPATIENT
Start: 2024-12-02 | End: 2024-12-02

## 2024-12-02 RX ORDER — MORPHINE SULFATE 4 MG/ML
4 INJECTION, SOLUTION INTRAMUSCULAR; INTRAVENOUS ONCE
Status: COMPLETED | OUTPATIENT
Start: 2024-12-02 | End: 2024-12-02

## 2024-12-02 RX ORDER — SODIUM CHLORIDE, SODIUM LACTATE, POTASSIUM CHLORIDE, CALCIUM CHLORIDE 600; 310; 30; 20 MG/100ML; MG/100ML; MG/100ML; MG/100ML
INJECTION, SOLUTION INTRAVENOUS CONTINUOUS
Status: DISCONTINUED | OUTPATIENT
Start: 2024-12-02 | End: 2024-12-03

## 2024-12-02 RX ORDER — SODIUM CHLORIDE 9 MG/ML
INJECTION, SOLUTION INTRAVENOUS PRN
Status: DISCONTINUED | OUTPATIENT
Start: 2024-12-02 | End: 2024-12-02

## 2024-12-02 RX ORDER — POTASSIUM CHLORIDE 1500 MG/1
40 TABLET, EXTENDED RELEASE ORAL PRN
Status: DISCONTINUED | OUTPATIENT
Start: 2024-12-02 | End: 2024-12-02

## 2024-12-02 RX ORDER — ONDANSETRON 4 MG/1
4 TABLET, ORALLY DISINTEGRATING ORAL EVERY 8 HOURS PRN
Status: DISCONTINUED | OUTPATIENT
Start: 2024-12-02 | End: 2024-12-02

## 2024-12-02 RX ORDER — ACETAMINOPHEN 325 MG/1
650 TABLET ORAL EVERY 6 HOURS PRN
Status: DISCONTINUED | OUTPATIENT
Start: 2024-12-02 | End: 2024-12-02

## 2024-12-02 RX ORDER — POLYETHYLENE GLYCOL 3350 17 G/17G
17 POWDER, FOR SOLUTION ORAL DAILY PRN
Status: DISCONTINUED | OUTPATIENT
Start: 2024-12-02 | End: 2024-12-03 | Stop reason: HOSPADM

## 2024-12-02 RX ORDER — SODIUM CHLORIDE 0.9 % (FLUSH) 0.9 %
5-40 SYRINGE (ML) INJECTION EVERY 12 HOURS SCHEDULED
Status: DISCONTINUED | OUTPATIENT
Start: 2024-12-02 | End: 2024-12-02

## 2024-12-02 RX ORDER — ALBUTEROL SULFATE 0.83 MG/ML
2.5 SOLUTION RESPIRATORY (INHALATION) EVERY 8 HOURS PRN
Status: DISCONTINUED | OUTPATIENT
Start: 2024-12-02 | End: 2024-12-03 | Stop reason: HOSPADM

## 2024-12-02 RX ORDER — SODIUM CHLORIDE 9 MG/ML
INJECTION, SOLUTION INTRAVENOUS PRN
Status: DISCONTINUED | OUTPATIENT
Start: 2024-12-02 | End: 2024-12-03 | Stop reason: HOSPADM

## 2024-12-02 RX ORDER — DEXAMETHASONE SODIUM PHOSPHATE 10 MG/ML
INJECTION, SOLUTION INTRAMUSCULAR; INTRAVENOUS
Status: DISCONTINUED | OUTPATIENT
Start: 2024-12-02 | End: 2024-12-02 | Stop reason: SDUPTHER

## 2024-12-02 RX ORDER — POTASSIUM CHLORIDE 7.45 MG/ML
10 INJECTION INTRAVENOUS PRN
Status: DISCONTINUED | OUTPATIENT
Start: 2024-12-02 | End: 2024-12-02

## 2024-12-02 RX ORDER — MIDAZOLAM HYDROCHLORIDE 2 MG/2ML
1 INJECTION, SOLUTION INTRAMUSCULAR; INTRAVENOUS EVERY 10 MIN PRN
Status: DISCONTINUED | OUTPATIENT
Start: 2024-12-02 | End: 2024-12-02

## 2024-12-02 RX ORDER — ALPRAZOLAM 1 MG/1
2 TABLET ORAL ONCE
Status: COMPLETED | OUTPATIENT
Start: 2024-12-02 | End: 2024-12-02

## 2024-12-02 RX ORDER — SODIUM CHLORIDE 0.9 % (FLUSH) 0.9 %
5-40 SYRINGE (ML) INJECTION EVERY 12 HOURS SCHEDULED
Status: DISCONTINUED | OUTPATIENT
Start: 2024-12-02 | End: 2024-12-03 | Stop reason: HOSPADM

## 2024-12-02 RX ORDER — LIDOCAINE HYDROCHLORIDE 10 MG/ML
INJECTION, SOLUTION EPIDURAL; INFILTRATION; INTRACAUDAL; PERINEURAL
Status: DISCONTINUED | OUTPATIENT
Start: 2024-12-02 | End: 2024-12-02 | Stop reason: SDUPTHER

## 2024-12-02 RX ORDER — ACETAMINOPHEN 650 MG/1
650 SUPPOSITORY RECTAL EVERY 6 HOURS PRN
Status: DISCONTINUED | OUTPATIENT
Start: 2024-12-02 | End: 2024-12-03 | Stop reason: HOSPADM

## 2024-12-02 RX ORDER — PANTOPRAZOLE SODIUM 40 MG/1
40 TABLET, DELAYED RELEASE ORAL
Status: DISCONTINUED | OUTPATIENT
Start: 2024-12-03 | End: 2024-12-03

## 2024-12-02 RX ORDER — CLOPIDOGREL BISULFATE 75 MG/1
75 TABLET ORAL ONCE
Status: COMPLETED | OUTPATIENT
Start: 2024-12-02 | End: 2024-12-02

## 2024-12-02 RX ORDER — PROTAMINE SULFATE 10 MG/ML
INJECTION, SOLUTION INTRAVENOUS
Status: DISCONTINUED | OUTPATIENT
Start: 2024-12-02 | End: 2024-12-02 | Stop reason: SDUPTHER

## 2024-12-02 RX ORDER — DIAZEPAM 5 MG/1
5 TABLET ORAL ONCE
Status: DISCONTINUED | OUTPATIENT
Start: 2024-12-02 | End: 2024-12-02

## 2024-12-02 RX ORDER — PROPOFOL 10 MG/ML
INJECTION, EMULSION INTRAVENOUS
Status: DISCONTINUED | OUTPATIENT
Start: 2024-12-02 | End: 2024-12-02 | Stop reason: SDUPTHER

## 2024-12-02 RX ORDER — ALPRAZOLAM 1 MG/1
2 TABLET ORAL
Status: COMPLETED | OUTPATIENT
Start: 2024-12-02 | End: 2024-12-03

## 2024-12-02 RX ORDER — SODIUM CHLORIDE 0.9 % (FLUSH) 0.9 %
5-40 SYRINGE (ML) INJECTION PRN
Status: DISCONTINUED | OUTPATIENT
Start: 2024-12-02 | End: 2024-12-02

## 2024-12-02 RX ORDER — DIAZEPAM 5 MG/1
10 TABLET ORAL ONCE
Status: COMPLETED | OUTPATIENT
Start: 2024-12-02 | End: 2024-12-02

## 2024-12-02 RX ORDER — ACETAMINOPHEN 325 MG/1
650 TABLET ORAL EVERY 6 HOURS PRN
Status: DISCONTINUED | OUTPATIENT
Start: 2024-12-02 | End: 2024-12-03 | Stop reason: HOSPADM

## 2024-12-02 RX ORDER — EPHEDRINE SULFATE/0.9% NACL/PF 25 MG/5 ML
SYRINGE (ML) INTRAVENOUS
Status: DISCONTINUED | OUTPATIENT
Start: 2024-12-02 | End: 2024-12-02 | Stop reason: SDUPTHER

## 2024-12-02 RX ORDER — FENTANYL CITRATE 50 UG/ML
25 INJECTION, SOLUTION INTRAMUSCULAR; INTRAVENOUS
Status: DISCONTINUED | OUTPATIENT
Start: 2024-12-02 | End: 2024-12-02

## 2024-12-02 RX ORDER — ALPRAZOLAM 1 MG/1
4 TABLET ORAL ONCE
Status: DISCONTINUED | OUTPATIENT
Start: 2024-12-02 | End: 2024-12-02

## 2024-12-02 RX ORDER — HEPARIN SODIUM 1000 [USP'U]/ML
INJECTION, SOLUTION INTRAVENOUS; SUBCUTANEOUS
Status: DISCONTINUED | OUTPATIENT
Start: 2024-12-02 | End: 2024-12-02 | Stop reason: SDUPTHER

## 2024-12-02 RX ORDER — MIDAZOLAM HYDROCHLORIDE 1 MG/ML
INJECTION, SOLUTION INTRAMUSCULAR; INTRAVENOUS
Status: DISCONTINUED | OUTPATIENT
Start: 2024-12-02 | End: 2024-12-02 | Stop reason: SDUPTHER

## 2024-12-02 RX ORDER — POLYETHYLENE GLYCOL 3350 17 G/17G
17 POWDER, FOR SOLUTION ORAL DAILY PRN
Status: DISCONTINUED | OUTPATIENT
Start: 2024-12-02 | End: 2024-12-02

## 2024-12-02 RX ORDER — HYDROXYZINE HYDROCHLORIDE 50 MG/1
50 TABLET, FILM COATED ORAL ONCE
Status: DISCONTINUED | OUTPATIENT
Start: 2024-12-02 | End: 2024-12-02

## 2024-12-02 RX ORDER — ONDANSETRON 2 MG/ML
4 INJECTION INTRAMUSCULAR; INTRAVENOUS EVERY 6 HOURS PRN
Status: DISCONTINUED | OUTPATIENT
Start: 2024-12-02 | End: 2024-12-02

## 2024-12-02 RX ORDER — FENTANYL CITRATE 50 UG/ML
INJECTION, SOLUTION INTRAMUSCULAR; INTRAVENOUS
Status: DISCONTINUED | OUTPATIENT
Start: 2024-12-02 | End: 2024-12-02 | Stop reason: SDUPTHER

## 2024-12-02 RX ADMIN — PHENYLEPHRINE HYDROCHLORIDE 200 MCG: 10 INJECTION INTRAVENOUS at 16:04

## 2024-12-02 RX ADMIN — FENTANYL CITRATE 50 MCG: 50 INJECTION, SOLUTION INTRAMUSCULAR; INTRAVENOUS at 14:49

## 2024-12-02 RX ADMIN — MIDAZOLAM 2 MG: 1 INJECTION INTRAMUSCULAR; INTRAVENOUS at 15:24

## 2024-12-02 RX ADMIN — IODIXANOL 89 ML: 270 INJECTION, SOLUTION INTRAVASCULAR at 17:44

## 2024-12-02 RX ADMIN — MORPHINE SULFATE 4 MG: 4 INJECTION INTRAVENOUS at 23:34

## 2024-12-02 RX ADMIN — PROTAMINE SULFATE 10 MG: 10 INJECTION, SOLUTION INTRAVENOUS at 17:26

## 2024-12-02 RX ADMIN — ALPRAZOLAM 2 MG: 1 TABLET ORAL at 23:35

## 2024-12-02 RX ADMIN — ASPIRIN 81 MG 81 MG: 81 TABLET ORAL at 12:52

## 2024-12-02 RX ADMIN — DEXMEDETOMIDINE HCL 6 MCG: 100 INJECTION INTRAVENOUS at 15:36

## 2024-12-02 RX ADMIN — LEVOTHYROXINE SODIUM 175 MCG: 100 TABLET ORAL at 20:24

## 2024-12-02 RX ADMIN — HEPARIN SODIUM 2000 UNITS: 1000 INJECTION, SOLUTION INTRAVENOUS; SUBCUTANEOUS at 16:33

## 2024-12-02 RX ADMIN — PROPOFOL 200 MG: 10 INJECTION, EMULSION INTRAVENOUS at 15:55

## 2024-12-02 RX ADMIN — LIDOCAINE HYDROCHLORIDE 50 MG: 10 INJECTION, SOLUTION EPIDURAL; INFILTRATION; INTRACAUDAL; PERINEURAL at 15:55

## 2024-12-02 RX ADMIN — PHENYLEPHRINE HYDROCHLORIDE 200 MCG: 10 INJECTION INTRAVENOUS at 16:11

## 2024-12-02 RX ADMIN — ROCURONIUM BROMIDE 20 MG: 10 INJECTION, SOLUTION INTRAVENOUS at 16:52

## 2024-12-02 RX ADMIN — PHENYLEPHRINE HYDROCHLORIDE 50 MCG/MIN: 10 INJECTION INTRAVENOUS at 16:18

## 2024-12-02 RX ADMIN — FENTANYL CITRATE 50 MCG: 50 INJECTION, SOLUTION INTRAMUSCULAR; INTRAVENOUS at 15:01

## 2024-12-02 RX ADMIN — FENTANYL CITRATE 50 MCG: 50 INJECTION, SOLUTION INTRAMUSCULAR; INTRAVENOUS at 15:55

## 2024-12-02 RX ADMIN — MIDAZOLAM 2 MG: 1 INJECTION INTRAMUSCULAR; INTRAVENOUS at 14:49

## 2024-12-02 RX ADMIN — SERTRALINE 200 MG: 50 TABLET, FILM COATED ORAL at 20:25

## 2024-12-02 RX ADMIN — PHENYLEPHRINE HYDROCHLORIDE 100 MCG: 10 INJECTION INTRAVENOUS at 16:24

## 2024-12-02 RX ADMIN — LIDOCAINE HYDROCHLORIDE 10 MG: 10 INJECTION, SOLUTION EPIDURAL; INFILTRATION; INTRACAUDAL; PERINEURAL at 14:55

## 2024-12-02 RX ADMIN — ROCURONIUM BROMIDE 50 MG: 10 INJECTION, SOLUTION INTRAVENOUS at 15:55

## 2024-12-02 RX ADMIN — EPHEDRINE SULFATE 10 MG: 5 INJECTION INTRAVENOUS at 16:09

## 2024-12-02 RX ADMIN — PHENYLEPHRINE HYDROCHLORIDE 100 MCG: 10 INJECTION INTRAVENOUS at 17:32

## 2024-12-02 RX ADMIN — PHENYLEPHRINE HYDROCHLORIDE 100 MCG: 10 INJECTION INTRAVENOUS at 15:33

## 2024-12-02 RX ADMIN — EPHEDRINE SULFATE 5 MG: 5 INJECTION INTRAVENOUS at 16:06

## 2024-12-02 RX ADMIN — SODIUM CHLORIDE, POTASSIUM CHLORIDE, SODIUM LACTATE AND CALCIUM CHLORIDE: 600; 310; 30; 20 INJECTION, SOLUTION INTRAVENOUS at 14:39

## 2024-12-02 RX ADMIN — SODIUM CHLORIDE, POTASSIUM CHLORIDE, SODIUM LACTATE AND CALCIUM CHLORIDE: 600; 310; 30; 20 INJECTION, SOLUTION INTRAVENOUS at 16:31

## 2024-12-02 RX ADMIN — HEPARIN SODIUM 5000 UNITS: 1000 INJECTION, SOLUTION INTRAVENOUS; SUBCUTANEOUS at 15:32

## 2024-12-02 RX ADMIN — CLOPIDOGREL BISULFATE 75 MG: 75 TABLET ORAL at 12:52

## 2024-12-02 RX ADMIN — HEPARIN SODIUM 2000 UNITS: 1000 INJECTION, SOLUTION INTRAVENOUS; SUBCUTANEOUS at 16:01

## 2024-12-02 RX ADMIN — DIAZEPAM 10 MG: 5 TABLET ORAL at 21:17

## 2024-12-02 RX ADMIN — FENTANYL CITRATE 50 MCG: 50 INJECTION, SOLUTION INTRAMUSCULAR; INTRAVENOUS at 15:40

## 2024-12-02 RX ADMIN — DEXMEDETOMIDINE HCL 8 MCG: 100 INJECTION INTRAVENOUS at 15:40

## 2024-12-02 RX ADMIN — DEXAMETHASONE SODIUM PHOSPHATE 10 MG: 10 INJECTION, SOLUTION INTRAMUSCULAR; INTRAVENOUS at 16:01

## 2024-12-02 RX ADMIN — PHENYLEPHRINE HYDROCHLORIDE 100 MCG: 10 INJECTION INTRAVENOUS at 16:20

## 2024-12-02 RX ADMIN — ONDANSETRON 4 MG: 2 INJECTION INTRAMUSCULAR; INTRAVENOUS at 17:29

## 2024-12-02 RX ADMIN — MIDAZOLAM HYDROCHLORIDE 2 MG: 1 INJECTION, SOLUTION INTRAMUSCULAR; INTRAVENOUS at 18:34

## 2024-12-02 RX ADMIN — PHENYLEPHRINE HYDROCHLORIDE 100 MCG: 10 INJECTION INTRAVENOUS at 15:55

## 2024-12-02 RX ADMIN — EPHEDRINE SULFATE 10 MG: 5 INJECTION INTRAVENOUS at 16:19

## 2024-12-02 RX ADMIN — DEXMEDETOMIDINE HCL 6 MCG: 100 INJECTION INTRAVENOUS at 15:27

## 2024-12-02 RX ADMIN — DIAZEPAM 10 MG: 5 TABLET ORAL at 20:24

## 2024-12-02 RX ADMIN — SUGAMMADEX 200 MG: 100 INJECTION, SOLUTION INTRAVENOUS at 17:38

## 2024-12-02 RX ADMIN — PHENYLEPHRINE HYDROCHLORIDE 200 MCG: 10 INJECTION INTRAVENOUS at 16:08

## 2024-12-02 ASSESSMENT — ENCOUNTER SYMPTOMS
VOICE CHANGE: 0
SHORTNESS OF BREATH: 0
NAUSEA: 0
COLOR CHANGE: 0
VOMITING: 0
PHOTOPHOBIA: 0
COUGH: 0

## 2024-12-02 ASSESSMENT — PAIN DESCRIPTION - DESCRIPTORS
DESCRIPTORS: ACHING
DESCRIPTORS: SORE;BURNING

## 2024-12-02 ASSESSMENT — PAIN DESCRIPTION - ORIENTATION: ORIENTATION: OTHER (COMMENT)

## 2024-12-02 ASSESSMENT — PAIN - FUNCTIONAL ASSESSMENT
PAIN_FUNCTIONAL_ASSESSMENT: 0-10
PAIN_FUNCTIONAL_ASSESSMENT: ACTIVITIES ARE NOT PREVENTED

## 2024-12-02 ASSESSMENT — PAIN SCALES - GENERAL
PAINLEVEL_OUTOF10: 7
PAINLEVEL_OUTOF10: 0

## 2024-12-02 ASSESSMENT — PAIN DESCRIPTION - LOCATION: LOCATION: GENERALIZED

## 2024-12-02 NOTE — ANESTHESIA PRE PROCEDURE
mouth nightly 7/26/24   Vida Serna APRN - CNP   lisinopril (PRINIVIL;ZESTRIL) 10 MG tablet take 1 tablet by mouth every morning and at bedtime 7/26/24   Vida Serna APRN - CNP   gabapentin (NEURONTIN) 600 MG tablet Take 1 tablet by mouth in the morning, at noon, in the evening, and at bedtime for 90 days.  Patient taking differently: Take 2 tablets by mouth 2 times daily. 7/26/24 11/29/24  Vida Serna APRN - CNP       Current medications:    Current Outpatient Medications   Medication Sig Dispense Refill    amphetamine-dextroamphetamine (ADDERALL) 20 MG tablet Take 1 tablet by mouth 2 times daily for 30 days. Max Daily Amount: 2 tablets 60 tablet 0    aspirin 81 MG EC tablet Take 1 tablet by mouth daily (Patient taking differently: Take 1 tablet by mouth at bedtime) 90 tablet 0    clopidogrel (PLAVIX) 75 MG tablet Take 1 tablet by mouth daily (Patient taking differently: Take 1 tablet by mouth at bedtime) 30 tablet 0    QUEtiapine (SEROQUEL) 100 MG tablet Take 1 tablet by mouth nightly as needed      fluocinolone acetonide (SYNALAR) 0.01 % external solution apply to affected area twice a day (Patient taking differently: Apply topically as needed apply to affected area twice a day to scalp as needed) 60 mL 1    levothyroxine (SYNTHROID) 175 MCG tablet Take 1 tablet by mouth nightly      traZODone (DESYREL) 100 MG tablet Take 1 tablet by mouth nightly as needed      clonazePAM (KLONOPIN) 1 MG tablet Take 1 tablet by mouth nightly as needed for Anxiety.      sertraline (ZOLOFT) 100 MG tablet take 2 tablets by mouth once daily at bedtime (Patient taking differently: Take 2 tablets by mouth at bedtime take 2 tablets by mouth once daily at bedtime) 180 tablet 1    omeprazole (PRILOSEC) 20 MG delayed release capsule Take 1 capsule by mouth daily (Patient taking differently: Take 1 capsule by mouth nightly) 90 capsule 1    lisinopril (PRINIVIL;ZESTRIL) 10 MG tablet take 1 tablet by mouth
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
## 2024-12-02 NOTE — PROGRESS NOTES
Patient stated he did not take his aspirin or plavix  this morning but did take it last night. Writer called down the Neuro IR and spoke to Ashely LEAVITT, patient needed to take the meds, dr. Bright  verified pt needed meds, writer read back medication orders to verify

## 2024-12-02 NOTE — PROGRESS NOTES
Patient stated he had a black cup of coffee this morning at 0800. Dr. Rosenberg made aware, no new orders.

## 2024-12-02 NOTE — BRIEF OP NOTE
[facility]    NEUROENDOVASCULAR SERVICE: POST-OP NOTE: 12/2/2024    Pt Name: Dania Jay  MRN: [id number]  YOB: 1990  Date of Procedure: 12/2/2024  Primary Care Physician: Vida Serna APRN - IMER      Pre-Procedural Diagnosis: Left ICA cervical pseudoaneurysm   Post-Procedural Diagnosis: Status post pipeline shield vantage embolization      Procedure Performed:Cerebral angiogram with FD embolization of cerebral aneurysm    Surgeon:   Reynaldo Bright MD    Fellow:  Roxana Westbrook MD and Famliia Buckley MD PhD     Assisting Tech:  Priyanka Rahman    PRE-PROCEDURAL EXAM:  Neurological exam performed and unchanged from initial H&P or consult      Anesthesia: General Anesthesia  An Immediate re-assessment was completed prior to sedation, and it is determined to be safe to proceed.  Complications: none    Intra-Operative EXAM:  Patient sedated with unchanged limited neurological exam    EBL: < Minimal      Cc            Specimens: Were not Obtained  Contrast:     Visipaque 270 low osmolar 89 Cc             Fluoro: 30.2 min    Findings:  Please see dictated Radiology note for further details  L distal cervical ICA superiorly pointing conical pseudoaneurysm measuring 6 mm x 5 mm with a parent vessel measuring 5.5 to 6.2 mm.   The above lesion was treated with 7F short sheath, 6F RIST 071, 5F penumbra SIM 1 catheter Berenstein and glide wire.   Once the aneurysm surgically evaluated. Phenom 27 and Brian 24 were advanced coaxially across the aneurysm into the petrous horizontal segment.   The microwire was removed and a 6 x 30 mm pipeline shield vantage flow diverter device was deployed across the aneurysm.  Microcatheter bumping technique was utilized  Final angiographic run demonstrated patent well apposed FD system across the aneurysm with contrast stagnation in the aneurysmal sac.     Wheeled in time 2:33 PM  Puncture time 3:30 PM  Close time 5:27 PM  Wheeled out time  5:45 PM        Misael score: class III    POST-PROCEDURAL EXAM :   Stable neurological Exam  Neurological exam performed and unchanged from initial H&P or consult    Closure:  right TR Band 6   F        POST-PROCEDURAL MONITORING : see orders  Disposition: Neuro ICU      Recommendations:  Back to Neuro ICU  Radial checks per protocol.  Peripheral pulse checks per protocol.  SBP goal 100-1 40  Continue the dual antiplatelet therapy  Follow up with Roxana Cao MD  8 weeks after discharge and Dr. Bright 3-4 months after discharge.        MD Reynaldo Hurley MD   Pager [phone]  Stroke, Neurocritical Care & Neurointervention  [facility]  [facility]  [facility] [place]  Electronically signed 12/2/2024 at 5:35 PM

## 2024-12-02 NOTE — SEDATION DOCUMENTATION
PRE PROCEDURE ASSESSMENT:    Pre procedure assessment:    Pt awake/ alert/ oriented x 3.  Voice clear and appropriate.  Pt stated name/ clearly, same as ID band.  Pt denies discomfort, denies disabilities at this time.    Resp even/ non labored.  Skin pink, warm, dry, cap refill < 3 sec.    Face symmetric, pupils PERRLA @ 4 mm.  Pt denies ANY vision deficits, none noted.    Pt moves all extremities x 4.  Good pulse, motor, sensory x 4.    Pedal pulses bilaterally palpable moderate.     Plan is Rt radial puncture for access..

## 2024-12-02 NOTE — SEDATION DOCUMENTATION
200 mcg nitroglycerin  2000 units Heparin  2.5 mg Verapamil    Mixed together in sterile fashion for injection of radial artery access per Dr. Buckley

## 2024-12-02 NOTE — SEDATION DOCUMENTATION
Closure Time:    All instrumentation removed  Manual pressure held at puncture site    TR Band applied externally and inflated with approx 16 cc of air

## 2024-12-02 NOTE — SEDATION DOCUMENTATION
Pt exceptionally emotional and anxious.  Multiple attempts to reassure and calm pt.  Decision to switch to general anesthesia made for pt safety and comfort.

## 2024-12-03 VITALS
HEIGHT: 73 IN | HEART RATE: 74 BPM | OXYGEN SATURATION: 96 % | WEIGHT: 220 LBS | TEMPERATURE: 98.2 F | DIASTOLIC BLOOD PRESSURE: 68 MMHG | BODY MASS INDEX: 29.16 KG/M2 | SYSTOLIC BLOOD PRESSURE: 113 MMHG | RESPIRATION RATE: 16 BRPM

## 2024-12-03 PROCEDURE — 2580000003 HC RX 258

## 2024-12-03 PROCEDURE — 6370000000 HC RX 637 (ALT 250 FOR IP): Performed by: STUDENT IN AN ORGANIZED HEALTH CARE EDUCATION/TRAINING PROGRAM

## 2024-12-03 PROCEDURE — 94761 N-INVAS EAR/PLS OXIMETRY MLT: CPT

## 2024-12-03 PROCEDURE — 6370000000 HC RX 637 (ALT 250 FOR IP)

## 2024-12-03 PROCEDURE — 99233 SBSQ HOSP IP/OBS HIGH 50: CPT | Performed by: PSYCHIATRY & NEUROLOGY

## 2024-12-03 PROCEDURE — 99291 CRITICAL CARE FIRST HOUR: CPT | Performed by: STUDENT IN AN ORGANIZED HEALTH CARE EDUCATION/TRAINING PROGRAM

## 2024-12-03 PROCEDURE — G0378 HOSPITAL OBSERVATION PER HR: HCPCS

## 2024-12-03 RX ORDER — PANTOPRAZOLE SODIUM 40 MG/1
40 TABLET, DELAYED RELEASE ORAL
Status: DISCONTINUED | OUTPATIENT
Start: 2024-12-03 | End: 2024-12-03 | Stop reason: HOSPADM

## 2024-12-03 RX ORDER — CLOPIDOGREL BISULFATE 75 MG/1
75 TABLET ORAL DAILY
Status: DISCONTINUED | OUTPATIENT
Start: 2024-12-03 | End: 2024-12-03 | Stop reason: HOSPADM

## 2024-12-03 RX ORDER — MAGNESIUM HYDROXIDE/ALUMINUM HYDROXICE/SIMETHICONE 120; 1200; 1200 MG/30ML; MG/30ML; MG/30ML
30 SUSPENSION ORAL ONCE
Status: COMPLETED | OUTPATIENT
Start: 2024-12-03 | End: 2024-12-03

## 2024-12-03 RX ORDER — ASPIRIN 81 MG/1
81 TABLET ORAL DAILY
Qty: 90 TABLET | Refills: 0 | Status: SHIPPED | OUTPATIENT
Start: 2024-12-03

## 2024-12-03 RX ORDER — CLOPIDOGREL BISULFATE 75 MG/1
75 TABLET ORAL DAILY
Qty: 90 TABLET | Refills: 0 | Status: SHIPPED | OUTPATIENT
Start: 2024-12-03

## 2024-12-03 RX ORDER — ASPIRIN 81 MG/1
81 TABLET, CHEWABLE ORAL DAILY
Status: DISCONTINUED | OUTPATIENT
Start: 2024-12-03 | End: 2024-12-03 | Stop reason: HOSPADM

## 2024-12-03 RX ORDER — OXYCODONE HYDROCHLORIDE 5 MG/1
5 TABLET ORAL ONCE
Status: COMPLETED | OUTPATIENT
Start: 2024-12-03 | End: 2024-12-03

## 2024-12-03 RX ADMIN — CLOPIDOGREL BISULFATE 75 MG: 75 TABLET ORAL at 09:08

## 2024-12-03 RX ADMIN — OXYCODONE 5 MG: 5 TABLET ORAL at 01:54

## 2024-12-03 RX ADMIN — ALUMINUM HYDROXIDE, MAGNESIUM HYDROXIDE, AND SIMETHICONE 30 ML: 1200; 120; 1200 SUSPENSION ORAL at 04:04

## 2024-12-03 RX ADMIN — ALPRAZOLAM 2 MG: 1 TABLET ORAL at 01:35

## 2024-12-03 RX ADMIN — SODIUM CHLORIDE, PRESERVATIVE FREE 5 ML: 5 INJECTION INTRAVENOUS at 09:08

## 2024-12-03 RX ADMIN — ASPIRIN 81 MG: 81 TABLET, CHEWABLE ORAL at 09:08

## 2024-12-03 RX ADMIN — PANTOPRAZOLE SODIUM 40 MG: 40 TABLET, DELAYED RELEASE ORAL at 09:08

## 2024-12-03 ASSESSMENT — PAIN - FUNCTIONAL ASSESSMENT: PAIN_FUNCTIONAL_ASSESSMENT: ACTIVITIES ARE NOT PREVENTED

## 2024-12-03 ASSESSMENT — PAIN DESCRIPTION - DESCRIPTORS: DESCRIPTORS: BURNING

## 2024-12-03 ASSESSMENT — PAIN DESCRIPTION - LOCATION: LOCATION: GENERALIZED

## 2024-12-03 ASSESSMENT — PAIN DESCRIPTION - ORIENTATION: ORIENTATION: OTHER (COMMENT)

## 2024-12-03 ASSESSMENT — PAIN SCALES - GENERAL: PAINLEVEL_OUTOF10: 4

## 2024-12-03 NOTE — PROGRESS NOTES
levothyroxine (SYNTHROID) 175 MCG tablet Take 1 tablet by mouth nightly 7/23/24 12/2/24 Yes Mariam Kennedy MD   traZODone (DESYREL) 100 MG tablet Take 1 tablet by mouth nightly as needed 8/7/24  Yes Mariam Kennedy MD   sertraline (ZOLOFT) 100 MG tablet take 2 tablets by mouth once daily at bedtime  Patient taking differently: Take 2 tablets by mouth at bedtime take 2 tablets by mouth once daily at bedtime 7/26/24  Yes Vida Serna APRN - CNP   omeprazole (PRILOSEC) 20 MG delayed release capsule Take 1 capsule by mouth daily  Patient taking differently: Take 1 capsule by mouth nightly 7/26/24  Yes Vida Serna APRN - CNP   lisinopril (PRINIVIL;ZESTRIL) 10 MG tablet take 1 tablet by mouth every morning and at bedtime 7/26/24  Yes Vida Serna APRN - CNP   gabapentin (NEURONTIN) 600 MG tablet Take 1 tablet by mouth in the morning, at noon, in the evening, and at bedtime for 90 days.  Patient taking differently: Take 2 tablets by mouth 2 times daily. 7/26/24 12/2/24 Yes Vida Serna APRN - CNP   fluocinolone acetonide (SYNALAR) 0.01 % external solution apply to affected area twice a day  Patient taking differently: Apply topically as needed apply to affected area twice a day to scalp as needed 9/19/24   Vida Serna APRN - CNP   clonazePAM (KLONOPIN) 1 MG tablet Take 1 tablet by mouth nightly as needed for Anxiety. 8/26/24   Mariam Kennedy MD    Scheduled Meds:   pantoprazole  40 mg Oral QAM AC    aspirin  81 mg Oral Daily    clopidogrel  75 mg Oral Daily    sodium chloride flush  5-40 mL IntraVENous 2 times per day    levothyroxine  175 mcg Oral Nightly    sertraline  200 mg Oral Nightly     Continuous Infusions:   sodium chloride       PRN Meds:.albuterol, sodium chloride, acetaminophen **OR** acetaminophen, polyethylene glycol  Past Medical History   has a past medical history of Acquired hypothyroidism, ADHD, Anxiety and depression, Arthritis,
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
## 2024-12-03 NOTE — PROGRESS NOTES
Dr. Tong rounded on patient with resident Rafael Bennett MD and Marilee Olson NP after patient took all monitors off and ripped out IVs himself, threatening to elope. RNs to bedside to place dressings on bleeding sites. Decision was made to discharge patient. Ride home from Mother pending.

## 2024-12-03 NOTE — DISCHARGE SUMMARY
to the left distal cervical ICA and the device was deployed across the aneurysm.  Microcatheter bumping technique was utilized.  The flow diverter is well apposed  and patent with a minimal distal ledge without contrast stagnation or endoleak Interpretation: There is residual flow in the aneurysm, Misael score III. Intracranial imaging showed no interval new occlusions or embolizations. After reviewing the final images, the microcatheter and IC were removed under fluoroscopic guidance. TR band closure device was used to establish hemostasis at the right radial artery access site. No immediate complications were experienced. Patient was re-examined after the procedure with no change noted in their neurologic examination, with distal pulses present. The patient was subsequently discharged from the neurointerventional suite to the neurointensive care unit. Impression: --L distal cervical ICA superiorly pointing conical pseudoaneurysm measuring 6 mm x 5 mm with a parent vessel measuring 5.5 to 6.2 mm. --The above treated with pipeline vantage shield 6 x 30 mm flow diverter device Dr. Cao dictated this invasive procedure. Dr Reynaldo Bright   was present for all procedural and imaging components of this case. Examination was reviewed and reported findings confirmed and evaluated by Dr. Reynaldo Bright.           DISCHARGE INSTRUCTIONS     Discharge Medications:        Medication List        CHANGE how you take these medications      aspirin 81 MG EC tablet  Take 1 tablet by mouth daily  What changed: when to take this     clopidogrel 75 MG tablet  Commonly known as: PLAVIX  Take 1 tablet by mouth daily  What changed: when to take this     fluocinolone acetonide 0.01 % external solution  Commonly known as: SYNALAR  apply to affected area twice a day  What changed:   how to take this  when to take this  reasons to take this  additional instructions     gabapentin 600 MG tablet  Commonly known as: NEURONTIN  Take 1 tablet by

## 2024-12-03 NOTE — PROGRESS NOTES
Patient consistently getting agitated and asking to leave. Writer gave patient scheduled medications and continues frequent site checks and neuro checks, he remains neurologically intact, TR band has been removed and replaced with a bandage. Physicians called to bedside to further discuss staying vs. Leaving  AMA.
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
## 2024-12-03 NOTE — DISCHARGE INSTRUCTIONS
DISCHARGE INSTRUCTIONS / ARM CARE POST CATHERIZATION           ENCOURAGE FLUIDS     NO STRENUOUS LIFTING WITH AFFECTED ARM FOR 3 DAYS ANYTHING HEAVIER THAN 8 TO 10 POUNDS     REMOVE BAND-AID/PRESSURE DRESSING THE FOLLOWING DAY AND DO NOT APPLY ANY FURTHER BAND-AIDS     KEEP INCISION CLEAN DRY AND OPEN TO THE AIR / NO HAND LOTION NEAR PUNCTURE SITE     WATCH FOR SIGNS OF INFECTION /  REDNESS / SWELLING / DRAINAGE / WARMTH / TEMPERATURE GREATER THAN 101     IF BLEEDING OCCURS HOLD MANUAL PRESSURE DIRECTLY OVER SITE  (YOU WILL FEEL PULSATION OF ARTERY) AND IF BLEEDING DOES NOT STOP AFTER 2 MINUTES CALL 911     OK TO SHOWER THE NEXT DAY, NO TUB BATHING OR HOT TUBS/SWIMMING FOR 7 DAYS     IF AREA BECOMES HARD AND SWOLLEN AND IF YOU ARE AT ALL CONCERNED SEEK HELP IMMEDIATELY     SEEK HELP IMMEDIATELY IF AFFECTED ARM BECOMES COLD / NUMB / SEVERE PAIN / NAILBEDS TURN BLUE      PLEASE PRACTICE GOOD HAND WASHING AND INCLUDE PUNCTURE SITE ESPECIALLY AFTER USING THE RESTROOM
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
## 2024-12-03 NOTE — PROGRESS NOTES
Patient remains alert and oriented x4, denies any suicidal thoughts. He is irritable but cooperative at this time.

## 2024-12-03 NOTE — ANESTHESIA POSTPROCEDURE EVALUATION
Department of Anesthesiology  Postprocedure Note    Patient: Dania Jay  MRN: [id number]  YOB: 1990  Date of evaluation: 12/3/2024    Procedure Summary       Date: 12/02/24 Room / Location: [facility] Procedures    Anesthesia Start: 1439 Anesthesia Stop: 1810    Procedure: IR ANGIOGRAM CAROTID CEREBRAL BILATERAL Diagnosis:       Pseudoaneurysm (HCC)      Status post angioplasty with stent      (left ICA aneurysm)    Scheduled Providers: Daniella Morin APRN - CRNA Responsible Provider: Jermaine Stewart MD    Anesthesia Type: MAC ASA Status: 3            Anesthesia Type: No value filed.    Mert Phase I: Mert Score: 10    Mert Phase II:      Anesthesia Post Evaluation    Patient location during evaluation: PACU  Patient participation: complete - patient participated  Airway patency: patent  Nausea & Vomiting: no nausea and no vomiting  Cardiovascular status: blood pressure returned to baseline  Respiratory status: acceptable  Hydration status: euvolemic  Pain management: adequate      No notable events documented.

## 2024-12-04 ENCOUNTER — TELEPHONE (OUTPATIENT)
Dept: FAMILY MEDICINE CLINIC | Age: 34
End: 2024-12-04

## 2024-12-04 NOTE — TELEPHONE ENCOUNTER
Care Transitions Initial Follow Up Call    Outreach made within 2 business days of discharge: Yes    Patient: Dania Jay Patient : 1990   MRN: [id number]  Reason for Admission: Left Pseudoaneurysm  Discharge Date: 12/3/24       Spoke with:     Discharge department/facility: [facility] Interactive Patient Contact:  Was patient able to fill all prescriptions:   Was patient instructed to bring all medications to the follow-up visit:   Is patient taking all medications as directed in the discharge summary?   Does patient understand their discharge instructions:   Does patient have questions or concerns that need addressed prior to 7-14 day follow up office visit:     Additional needs identified to be addressed with provider  In hospital for planned procedure. Following up with specialists.              Scheduled appointment with PCP within 7-14 days    Follow Up  Future Appointments   Date Time Provider Department Center   2024 10:45 AM Vida Serna, APRN - CNP [facility] ECC DEP       Xiomara Apodaca MA

## 2024-12-10 ENCOUNTER — OFFICE VISIT (OUTPATIENT)
Dept: FAMILY MEDICINE CLINIC | Age: 34
End: 2024-12-10

## 2024-12-10 ENCOUNTER — HOSPITAL ENCOUNTER (OUTPATIENT)
Age: 34
Setting detail: SPECIMEN
Discharge: HOME | End: 2024-12-10

## 2024-12-10 VITALS
SYSTOLIC BLOOD PRESSURE: 110 MMHG | BODY MASS INDEX: 29.55 KG/M2 | DIASTOLIC BLOOD PRESSURE: 70 MMHG | OXYGEN SATURATION: 95 % | WEIGHT: 224 LBS | HEART RATE: 78 BPM

## 2024-12-10 DIAGNOSIS — Z86.39 HISTORY OF ELEVATED GLUCOSE: ICD-10-CM

## 2024-12-10 DIAGNOSIS — Z76.0 MEDICATION REFILL: ICD-10-CM

## 2024-12-10 DIAGNOSIS — Z09 HOSPITAL DISCHARGE FOLLOW-UP: ICD-10-CM

## 2024-12-10 DIAGNOSIS — F11.11 OPIOID ABUSE, IN REMISSION (HCC): ICD-10-CM

## 2024-12-10 DIAGNOSIS — Z13.1 ENCOUNTER FOR SCREENING FOR DIABETES MELLITUS: Primary | ICD-10-CM

## 2024-12-10 DIAGNOSIS — F10.11 ALCOHOL ABUSE, IN REMISSION: ICD-10-CM

## 2024-12-10 LAB
ALCOHOL URINE: ABNORMAL
AMPHET UR QL SCN: NEGATIVE
AMPHETAMINE SCREEN URINE: ABNORMAL
BARBITURATE SCREEN URINE: ABNORMAL
BARBITURATES UR QL SCN: NEGATIVE
BENZODIAZ UR QL: POSITIVE
BENZODIAZEPINE SCREEN, URINE: ABNORMAL
BUPRENORPHINE URINE: ABNORMAL
CANNABINOIDS UR QL SCN: POSITIVE
COCAINE METABOLITE SCREEN URINE: ABNORMAL
COCAINE UR QL SCN: NEGATIVE
FENTANYL SCREEN, URINE: ABNORMAL
FENTANYL UR QL: NEGATIVE
GABAPENTIN SCREEN, URINE: ABNORMAL
HBA1C MFR BLD: 5.2 %
MDMA, URINE: ABNORMAL
METHADONE SCREEN, URINE: ABNORMAL
METHADONE UR QL: NEGATIVE
METHAMPHETAMINE, URINE: ABNORMAL
OPIATE SCREEN URINE: ABNORMAL
OPIATES UR QL SCN: NEGATIVE
OXYCODONE SCREEN URINE: ABNORMAL
OXYCODONE UR QL SCN: NEGATIVE
PCP UR QL SCN: NEGATIVE
PHENCYCLIDINE SCREEN URINE: ABNORMAL
PROPOXYPHENE SCREEN, URINE: ABNORMAL
SYNTHETIC CANNABINOIDS(K2) SCREEN, URINE: ABNORMAL
TEST INFORMATION: ABNORMAL
THC SCREEN, URINE: POSITIVE
TRAMADOL SCREEN URINE: ABNORMAL
TRICYCLIC ANTIDEPRESSANTS, UR: ABNORMAL

## 2024-12-10 RX ORDER — SERTRALINE HYDROCHLORIDE 100 MG/1
TABLET, FILM COATED ORAL
Qty: 180 TABLET | Refills: 1 | Status: SHIPPED
Start: 2024-12-10 | End: 2024-12-11 | Stop reason: SDUPTHER

## 2024-12-10 RX ORDER — LISINOPRIL 10 MG/1
TABLET ORAL
Qty: 180 TABLET | Refills: 1 | Status: SHIPPED
Start: 2024-12-10 | End: 2024-12-11 | Stop reason: SDUPTHER

## 2024-12-10 NOTE — PROGRESS NOTES
normal rate, regular rhythm, normal S1 and S2, no murmurs, rubs, clicks, or gallops, distal pulses intact, no carotid bruits  Abdomen: soft, non-tender, non-distended, normal bowel sounds, no masses or organomegaly  Extremities: no cyanosis, clubbing or edema  Musculoskeletal: normal range of motion, no joint swelling, deformity or tenderness      An electronic signature was used to authenticate this note.  --SPENSER GARRIDO, APRN - CNP

## 2024-12-11 DIAGNOSIS — Z76.0 MEDICATION REFILL: ICD-10-CM

## 2024-12-11 NOTE — TELEPHONE ENCOUNTER
These meds were sent to patient's specialty pharmacy yesterday and need to be sent to local pharmacy. Only vivitrol goes to [facility].

## 2024-12-12 ENCOUNTER — TELEPHONE (OUTPATIENT)
Dept: NEUROLOGY | Age: 34
End: 2024-12-12

## 2024-12-12 RX ORDER — LISINOPRIL 10 MG/1
TABLET ORAL
Qty: 180 TABLET | Refills: 1 | Status: SHIPPED | OUTPATIENT
Start: 2024-12-12

## 2024-12-12 RX ORDER — SERTRALINE HYDROCHLORIDE 100 MG/1
TABLET, FILM COATED ORAL
Qty: 180 TABLET | Refills: 1 | Status: SHIPPED | OUTPATIENT
Start: 2024-12-12

## 2024-12-12 NOTE — TELEPHONE ENCOUNTER
I spoke with pt mother and let her know what Dr. Buckley saying if it's just for one day as noted, it should be no issue and if pt requires holding blood thinners for longer, then will need to discuss further the risks/benefits.

## 2024-12-12 NOTE — TELEPHONE ENCOUNTER
Pt mother called and left a vm saying pt fell on face and eye is swollen. Pt went to the ER no fracture or brain bleed. Pt eye is swollen and she wanting to know if pt can stop is blood thinners for a day?

## 2024-12-12 NOTE — TELEPHONE ENCOUNTER
If it's just for one day as noted, it should be no issue. If patient requires holding blood thinners for longer, then will need to discuss further the risks/benefits.

## 2024-12-16 DIAGNOSIS — Z76.0 MEDICATION REFILL: ICD-10-CM

## 2024-12-16 NOTE — TELEPHONE ENCOUNTER
Last visit: 12/10/2024  Last Med refill: 07/26/2024  Does patient have enough medication for 72 hours: No    Next Visit Date:  Future Appointments   Date Time Provider Department Center   1/8/2025 11:00 AM SCHEDULE, LUIS ANDERSON [facility] ECC DEP       Health Maintenance   Topic Date Due    Hib vaccine (1 of 1 - Risk 1-dose series) Never done    Meningococcal (ACWY) vaccine (1 - Risk 2-dose series) Never done    Meningococcal B vaccine (1 of 4 - Increased Risk) Never done    Varicella vaccine (2 of 2 - 2-dose childhood series) 11/18/2000    Pneumococcal 0-64 years Vaccine (2 of 2 - PCV) 03/10/2025 (Originally 2/25/2020)    Flu vaccine (1) 08/30/2025 (Originally 8/1/2024)    COVID-19 Vaccine (1 - 2023-24 season) 12/10/2025 (Originally 9/1/2024)    Depression Monitoring  02/08/2025    Diabetes screen  12/10/2027    DTaP/Tdap/Td vaccine (3 - Td or Tdap) 09/04/2032    Shingles vaccine (1 of 2) 03/11/2039    Hepatitis C screen  Completed    HIV screen  Completed    Hepatitis A vaccine  Aged Out    HPV vaccine  Aged Out    Polio vaccine  Aged Out    Hepatitis B vaccine  Discontinued       Hemoglobin A1C (%)   Date Value   12/10/2024 5.2             ( goal A1C is < 7)   No components found for: \"LABMICR\"  No components found for: \"LDLCHOLESTEROL\", \"LDLCALC\"    (goal LDL is <100)   AST (U/L)   Date Value   04/01/2024 28     ALT (U/L)   Date Value   04/01/2024 35     BUN (mg/dL)   Date Value   04/01/2024 12     BP Readings from Last 3 Encounters:   12/10/24 110/70   12/03/24 113/68   11/14/24 123/85          (goal 120/80)    All Future Testing planned in [facility]  Lab Frequency Next Occurrence   PTH, Intact Once 04/04/2024               Patient Active Problem List:     Respiratory insufficiency     Traumatic brain injury     PTSD (post-traumatic stress disorder)     Suicidal ideation     Depression     Opioid abuse, in remission (HCC)     Left Pseudoaneurysm of carotid artery (HCC)     Depression, major,

## 2024-12-17 RX ORDER — GABAPENTIN 600 MG/1
600 TABLET ORAL 4 TIMES DAILY
Qty: 120 TABLET | Refills: 2 | Status: SHIPPED | OUTPATIENT
Start: 2024-12-17 | End: 2025-03-17

## 2024-12-19 DIAGNOSIS — F90.2 ATTENTION DEFICIT HYPERACTIVITY DISORDER (ADHD), COMBINED TYPE: ICD-10-CM

## 2024-12-19 DIAGNOSIS — Z76.0 MEDICATION REFILL: ICD-10-CM

## 2024-12-19 RX ORDER — FLUOCINOLONE ACETONIDE 0.1 MG/ML
SOLUTION TOPICAL DAILY
Qty: 1 EACH | Refills: 1 | Status: SHIPPED | OUTPATIENT
Start: 2024-12-19

## 2024-12-19 RX ORDER — FLUOCINOLONE ACETONIDE 0.1 MG/ML
SOLUTION TOPICAL PRN
Status: CANCELLED | OUTPATIENT
Start: 2024-12-19

## 2024-12-19 RX ORDER — DEXTROAMPHETAMINE SACCHARATE, AMPHETAMINE ASPARTATE, DEXTROAMPHETAMINE SULFATE AND AMPHETAMINE SULFATE 5; 5; 5; 5 MG/1; MG/1; MG/1; MG/1
1 TABLET ORAL 2 TIMES DAILY
Qty: 60 TABLET | Refills: 0 | Status: SHIPPED | OUTPATIENT
Start: 2024-12-19 | End: 2025-01-18

## 2024-12-19 NOTE — TELEPHONE ENCOUNTER
Last Visit:  12/10/2024     Next Visit Date:  Future Appointments   Date Time Provider Department Center   1/8/2025 11:00 AM SCHEDULE, [facility] ROMEL ANDERSON [facility]       Health Maintenance   Topic Date Due    Hib vaccine (1 of 1 - Risk 1-dose series) Never done    Meningococcal (ACWY) vaccine (1 - Risk 2-dose series) Never done    Meningococcal B vaccine (1 of 4 - Increased Risk) Never done    Varicella vaccine (2 of 2 - 2-dose childhood series) 11/18/2000    Pneumococcal 0-64 years Vaccine (2 of 2 - PCV) 03/10/2025 (Originally 2/25/2020)    Flu vaccine (1) 08/30/2025 (Originally 8/1/2024)    COVID-19 Vaccine (1 - 2023-24 season) 12/10/2025 (Originally 9/1/2024)    Depression Monitoring  02/08/2025    Diabetes screen  12/10/2027    DTaP/Tdap/Td vaccine (3 - Td or Tdap) 09/04/2032    Shingles vaccine (1 of 2) 03/11/2039    Hepatitis C screen  Completed    HIV screen  Completed    Hepatitis A vaccine  Aged Out    HPV vaccine  Aged Out    Polio vaccine  Aged Out    Hepatitis B vaccine  Discontinued       Hemoglobin A1C (%)   Date Value   12/10/2024 5.2             ( goal A1C is < 7)   No components found for: \"LABMICR\"  No components found for: \"LDLCHOLESTEROL\", \"LDLCALC\"    (goal LDL is <100)   AST (U/L)   Date Value   04/01/2024 28     ALT (U/L)   Date Value   04/01/2024 35     BUN (mg/dL)   Date Value   04/01/2024 12     BP Readings from Last 3 Encounters:   12/10/24 110/70   12/03/24 113/68   11/14/24 123/85          (goal 120/80)    All Future Testing planned in [facility]  Lab Frequency Next Occurrence   PTH, Intact Once 04/04/2024               Patient Active Problem List:     Respiratory insufficiency     Traumatic brain injury     PTSD (post-traumatic stress disorder)     Suicidal ideation     Depression     Opioid abuse, in remission (HCC)     Left Pseudoaneurysm of carotid artery (HCC)     Depression, major, recurrent (HCC)     Status post angioplasty with stent     Pseudoaneurysm (HCC)

## 2025-01-10 ENCOUNTER — TELEPHONE (OUTPATIENT)
Dept: FAMILY MEDICINE CLINIC | Age: 35
End: 2025-01-10

## 2025-01-10 NOTE — TELEPHONE ENCOUNTER
Scheduled vivitrol delivery for 1/15. Called patient to schedule and states he will call back.  Can be scheduled any time after 1/15.

## 2025-01-20 DIAGNOSIS — F90.2 ATTENTION DEFICIT HYPERACTIVITY DISORDER (ADHD), COMBINED TYPE: ICD-10-CM

## 2025-01-20 RX ORDER — DEXTROAMPHETAMINE SACCHARATE, AMPHETAMINE ASPARTATE, DEXTROAMPHETAMINE SULFATE AND AMPHETAMINE SULFATE 5; 5; 5; 5 MG/1; MG/1; MG/1; MG/1
1 TABLET ORAL 2 TIMES DAILY
Qty: 60 TABLET | Refills: 0 | Status: SHIPPED | OUTPATIENT
Start: 2025-01-20 | End: 2025-02-19

## 2025-01-20 NOTE — TELEPHONE ENCOUNTER
Last visit: 12/10/24  Last Med refill: 12/19/24  Does patient have enough medication for 72 hours: No:     Next Visit Date:  Future Appointments   Date Time Provider Department Center   1/27/2025  1:00 PM SCHEDULE, LUIS ANDERSON [facility] ECC DEP       Health Maintenance   Topic Date Due    Hib vaccine (1 of 1 - Risk 1-dose series) Never done    Meningococcal (ACWY) vaccine (1 - Risk 2-dose series) Never done    Meningococcal B vaccine (1 of 4 - Increased Risk) Never done    Varicella vaccine (2 of 2 - 2-dose childhood series) 11/18/2000    Depression Monitoring  02/08/2025    Pneumococcal 0-64 years Vaccine (2 of 2 - PCV) 03/10/2025 (Originally 2/25/2020)    Flu vaccine (1) 08/30/2025 (Originally 8/1/2024)    COVID-19 Vaccine (1 - 2023-24 season) 12/10/2025 (Originally 9/1/2024)    Diabetes screen  12/10/2027    DTaP/Tdap/Td vaccine (3 - Td or Tdap) 09/04/2032    Shingles vaccine (1 of 2) 03/11/2039    Hepatitis C screen  Completed    HIV screen  Completed    Hepatitis A vaccine  Aged Out    HPV vaccine  Aged Out    Polio vaccine  Aged Out    Hepatitis B vaccine  Discontinued       Hemoglobin A1C (%)   Date Value   12/10/2024 5.2             ( goal A1C is < 7)   No components found for: \"LABMICR\"  No components found for: \"LDLCHOLESTEROL\", \"LDLCALC\"    (goal LDL is <100)   AST (U/L)   Date Value   04/01/2024 28     ALT (U/L)   Date Value   04/01/2024 35     BUN (mg/dL)   Date Value   04/01/2024 12     BP Readings from Last 3 Encounters:   12/10/24 110/70   12/03/24 113/68   11/14/24 123/85          (goal 120/80)    All Future Testing planned in [facility]  Lab Frequency Next Occurrence   PTH, Intact Once 04/04/2024               Patient Active Problem List:     Respiratory insufficiency     Traumatic brain injury     PTSD (post-traumatic stress disorder)     Suicidal ideation     Depression     Opioid abuse, in remission (HCC)     Left Pseudoaneurysm of carotid artery (HCC)     Depression, major, recurrent

## 2025-01-27 ENCOUNTER — NURSE ONLY (OUTPATIENT)
Dept: FAMILY MEDICINE CLINIC | Age: 35
End: 2025-01-27

## 2025-01-27 DIAGNOSIS — F10.11 ALCOHOL ABUSE, IN REMISSION: Primary | ICD-10-CM

## 2025-01-27 NOTE — PROGRESS NOTES
After obtaining consent, and per orders of Vida Serna, injection of vivitrol given in Left vastus lateralis by Xiomara Apodaca MA. Patient instructed to remain in clinic for 20 minutes afterwards, and to report any adverse reaction to me immediately.    Patient supplied medication.

## 2025-01-30 DIAGNOSIS — F10.11 ALCOHOL ABUSE, IN REMISSION: ICD-10-CM

## 2025-01-30 RX ORDER — NALTREXONE 380 MG
KIT INTRAMUSCULAR
Qty: 1 EACH | Refills: 0 | OUTPATIENT
Start: 2025-01-30

## 2025-02-17 DIAGNOSIS — F90.2 ATTENTION DEFICIT HYPERACTIVITY DISORDER (ADHD), COMBINED TYPE: ICD-10-CM

## 2025-02-17 RX ORDER — DEXTROAMPHETAMINE SACCHARATE, AMPHETAMINE ASPARTATE, DEXTROAMPHETAMINE SULFATE AND AMPHETAMINE SULFATE 5; 5; 5; 5 MG/1; MG/1; MG/1; MG/1
1 TABLET ORAL 2 TIMES DAILY
Qty: 60 TABLET | Refills: 0 | Status: SHIPPED | OUTPATIENT
Start: 2025-02-17 | End: 2025-03-19

## 2025-02-17 NOTE — TELEPHONE ENCOUNTER
Patient called asking for refill on:  -Adderall    [facility] on [address]      Last visit: 12/10/24  Last Med refill: 1/20/25  Does patient have enough medication for 72 hours: No: only has 2 days left    Next Visit Date:  Future Appointments   Date Time Provider Department Center   2/25/2025 10:30 AM Vida Serna, APRN - CNP [facility] ECC DEP       Health Maintenance   Topic Date Due    Hib vaccine (1 of 1 - Risk 1-dose series) Never done    Meningococcal (ACWY) vaccine (1 - Risk 2-dose series) Never done    Meningococcal B vaccine (1 of 4 - Increased Risk) Never done    Varicella vaccine (2 of 2 - 2-dose childhood series) 11/18/2000    Depression Monitoring  02/08/2025    Pneumococcal 0-49 years Vaccine (2 of 2 - PCV) 03/10/2025 (Originally 2/25/2020)    Flu vaccine (1) 08/30/2025 (Originally 8/1/2024)    COVID-19 Vaccine (1 - 2024-25 season) 12/10/2025 (Originally 9/1/2024)    Diabetes screen  12/10/2027    DTaP/Tdap/Td vaccine (3 - Td or Tdap) 09/04/2032    Shingles vaccine (1 of 2) 03/11/2039    Hepatitis C screen  Completed    HIV screen  Completed    Hepatitis A vaccine  Aged Out    HPV vaccine  Aged Out    Polio vaccine  Aged Out    Hepatitis B vaccine  Discontinued       Hemoglobin A1C (%)   Date Value   12/10/2024 5.2             ( goal A1C is < 7)   No components found for: \"LABMICR\"  No components found for: \"LDLCHOLESTEROL\", \"LDLCALC\"    (goal LDL is <100)   AST (U/L)   Date Value   04/01/2024 28     ALT (U/L)   Date Value   04/01/2024 35     BUN (mg/dL)   Date Value   04/01/2024 12     BP Readings from Last 3 Encounters:   12/10/24 110/70   12/03/24 113/68   11/14/24 123/85          (goal 120/80)    All Future Testing planned in [facility]  Lab Frequency Next Occurrence   PTH, Intact Once 04/04/2024               Patient Active Problem List:     Respiratory insufficiency     Traumatic brain injury     PTSD (post-traumatic stress disorder)     Suicidal ideation     Depression     Opioid

## 2025-02-26 ENCOUNTER — TELEPHONE (OUTPATIENT)
Dept: FAMILY MEDICINE CLINIC | Age: 35
End: 2025-02-26

## 2025-02-26 NOTE — TELEPHONE ENCOUNTER
Tried to call patient to set up appt to receive injection.  Unable to reach patient. Mailbox was full.

## 2025-03-11 ENCOUNTER — OFFICE VISIT (OUTPATIENT)
Dept: FAMILY MEDICINE CLINIC | Age: 35
End: 2025-03-11

## 2025-03-11 VITALS
SYSTOLIC BLOOD PRESSURE: 120 MMHG | HEART RATE: 84 BPM | WEIGHT: 211 LBS | BODY MASS INDEX: 27.84 KG/M2 | DIASTOLIC BLOOD PRESSURE: 70 MMHG | OXYGEN SATURATION: 98 %

## 2025-03-11 DIAGNOSIS — F90.2 ATTENTION DEFICIT HYPERACTIVITY DISORDER (ADHD), COMBINED TYPE: ICD-10-CM

## 2025-03-11 DIAGNOSIS — Z76.0 MEDICATION REFILL: ICD-10-CM

## 2025-03-11 DIAGNOSIS — F10.11 ALCOHOL ABUSE, IN REMISSION: Primary | ICD-10-CM

## 2025-03-11 RX ORDER — DEXTROAMPHETAMINE SACCHARATE, AMPHETAMINE ASPARTATE, DEXTROAMPHETAMINE SULFATE AND AMPHETAMINE SULFATE 5; 5; 5; 5 MG/1; MG/1; MG/1; MG/1
20 TABLET ORAL 2 TIMES DAILY
Qty: 60 TABLET | Refills: 0 | Status: CANCELLED | OUTPATIENT
Start: 2025-04-10 | End: 2025-05-10

## 2025-03-11 RX ORDER — DEXTROAMPHETAMINE SACCHARATE, AMPHETAMINE ASPARTATE, DEXTROAMPHETAMINE SULFATE AND AMPHETAMINE SULFATE 5; 5; 5; 5 MG/1; MG/1; MG/1; MG/1
20 TABLET ORAL 2 TIMES DAILY
Qty: 60 TABLET | Refills: 0 | Status: CANCELLED | OUTPATIENT
Start: 2025-03-11 | End: 2025-04-10

## 2025-03-11 RX ORDER — GABAPENTIN 600 MG/1
600 TABLET ORAL 4 TIMES DAILY
Qty: 120 TABLET | Refills: 2 | Status: SHIPPED | OUTPATIENT
Start: 2025-03-11 | End: 2025-06-09

## 2025-03-11 RX ORDER — DEXTROAMPHETAMINE SACCHARATE, AMPHETAMINE ASPARTATE, DEXTROAMPHETAMINE SULFATE AND AMPHETAMINE SULFATE 5; 5; 5; 5 MG/1; MG/1; MG/1; MG/1
1 TABLET ORAL 2 TIMES DAILY
Qty: 60 TABLET | Refills: 0 | Status: CANCELLED | OUTPATIENT
Start: 2025-03-11 | End: 2025-04-10

## 2025-03-11 RX ORDER — DEXTROAMPHETAMINE SACCHARATE, AMPHETAMINE ASPARTATE, DEXTROAMPHETAMINE SULFATE AND AMPHETAMINE SULFATE 5; 5; 5; 5 MG/1; MG/1; MG/1; MG/1
20 TABLET ORAL 2 TIMES DAILY
Qty: 60 TABLET | Refills: 0 | Status: CANCELLED | OUTPATIENT
Start: 2025-05-10 | End: 2025-06-09

## 2025-03-11 SDOH — ECONOMIC STABILITY: FOOD INSECURITY: WITHIN THE PAST 12 MONTHS, YOU WORRIED THAT YOUR FOOD WOULD RUN OUT BEFORE YOU GOT MONEY TO BUY MORE.: SOMETIMES TRUE

## 2025-03-11 SDOH — ECONOMIC STABILITY: FOOD INSECURITY: WITHIN THE PAST 12 MONTHS, THE FOOD YOU BOUGHT JUST DIDN'T LAST AND YOU DIDN'T HAVE MONEY TO GET MORE.: SOMETIMES TRUE

## 2025-03-11 ASSESSMENT — PATIENT HEALTH QUESTIONNAIRE - PHQ9
8. MOVING OR SPEAKING SO SLOWLY THAT OTHER PEOPLE COULD HAVE NOTICED. OR THE OPPOSITE, BEING SO FIGETY OR RESTLESS THAT YOU HAVE BEEN MOVING AROUND A LOT MORE THAN USUAL: NOT AT ALL
SUM OF ALL RESPONSES TO PHQ QUESTIONS 1-9: 0
1. LITTLE INTEREST OR PLEASURE IN DOING THINGS: NOT AT ALL
SUM OF ALL RESPONSES TO PHQ QUESTIONS 1-9: 0
7. TROUBLE CONCENTRATING ON THINGS, SUCH AS READING THE NEWSPAPER OR WATCHING TELEVISION: NOT AT ALL
2. FEELING DOWN, DEPRESSED OR HOPELESS: NOT AT ALL
5. POOR APPETITE OR OVEREATING: NOT AT ALL
10. IF YOU CHECKED OFF ANY PROBLEMS, HOW DIFFICULT HAVE THESE PROBLEMS MADE IT FOR YOU TO DO YOUR WORK, TAKE CARE OF THINGS AT HOME, OR GET ALONG WITH OTHER PEOPLE: NOT DIFFICULT AT ALL
SUM OF ALL RESPONSES TO PHQ QUESTIONS 1-9: 0
9. THOUGHTS THAT YOU WOULD BE BETTER OFF DEAD, OR OF HURTING YOURSELF: NOT AT ALL
6. FEELING BAD ABOUT YOURSELF - OR THAT YOU ARE A FAILURE OR HAVE LET YOURSELF OR YOUR FAMILY DOWN: NOT AT ALL
SUM OF ALL RESPONSES TO PHQ QUESTIONS 1-9: 0
4. FEELING TIRED OR HAVING LITTLE ENERGY: NOT AT ALL
3. TROUBLE FALLING OR STAYING ASLEEP: NOT AT ALL

## 2025-03-11 NOTE — PROGRESS NOTES
Dania Jay (:  1990) is a 35 y.o. male,Established patient, here for evaluation of the following chief complaint(s):  1 Month Follow-Up (vivitrol) and ADHD (3mo follow up)         Assessment & Plan  Alcohol abuse, in remission   Chronic, at goal (stable), continue current treatment plan    Orders:    POCT Rapid Drug Screen    naltrexone (VIVITROL) injection 380 mg    Attention deficit hyperactivity disorder (ADHD), combined type   Chronic, at goal (stable), continue current treatment plan         Medication refill       Orders:    gabapentin (NEURONTIN) 600 MG tablet; Take 1 tablet by mouth in the morning, at noon, in the evening, and at bedtime for 90 days.      No follow-ups on file.       Subjective   ADHD  Pertinent negatives include no chest pain, chills, coughing or fever.       Review of Systems   Constitutional:  Negative for chills and fever.   Respiratory:  Negative for cough and shortness of breath.    Cardiovascular:  Negative for chest pain and leg swelling.     Objective   Vitals:    25 1025   BP: 120/70   Pulse: 84   SpO2: 98%     Wt Readings from Last 3 Encounters:   25 95.7 kg (211 lb)   12/10/24 101.6 kg (224 lb)   24 99.8 kg (220 lb)       Physical Exam  Constitutional:       General: He is not in acute distress.     Appearance: He is well-developed.   HENT:      Head: Normocephalic.      Right Ear: External ear normal.      Left Ear: External ear normal.   Cardiovascular:      Rate and Rhythm: Normal rate and regular rhythm.      Heart sounds: Normal heart sounds.   Pulmonary:      Effort: Pulmonary effort is normal.      Breath sounds: Normal breath sounds.   Musculoskeletal:         General: Normal range of motion.   Skin:     General: Skin is warm and dry.   Neurological:      Mental Status: He is alert and oriented to person, place, and time.           An electronic signature was used to authenticate this note.    --PHOEBE HELM - CNP

## 2025-03-17 DIAGNOSIS — F90.2 ATTENTION DEFICIT HYPERACTIVITY DISORDER (ADHD), COMBINED TYPE: ICD-10-CM

## 2025-03-17 RX ORDER — DEXTROAMPHETAMINE SACCHARATE, AMPHETAMINE ASPARTATE, DEXTROAMPHETAMINE SULFATE AND AMPHETAMINE SULFATE 5; 5; 5; 5 MG/1; MG/1; MG/1; MG/1
1 TABLET ORAL 2 TIMES DAILY
Qty: 60 TABLET | Refills: 0 | OUTPATIENT
Start: 2025-03-17 | End: 2025-04-16

## 2025-03-17 RX ORDER — DEXTROAMPHETAMINE SACCHARATE, AMPHETAMINE ASPARTATE, DEXTROAMPHETAMINE SULFATE AND AMPHETAMINE SULFATE 5; 5; 5; 5 MG/1; MG/1; MG/1; MG/1
1 TABLET ORAL 2 TIMES DAILY
Qty: 60 TABLET | Refills: 0 | Status: SHIPPED | OUTPATIENT
Start: 2025-03-17 | End: 2025-04-16

## 2025-03-17 NOTE — TELEPHONE ENCOUNTER
Last visit: 03/11/2025  Last Med refill: 02/17/2025  Does patient have enough medication for 72 hours: No    Next Visit Date:  Future Appointments   Date Time Provider Department Center   4/9/2025 11:00 AM SCHEDULE, [facility] ER DEP   6/9/2025 11:15 AM Vida Serna, APRN - CNP [facility] DEP       Health Maintenance   Topic Date Due    Hib vaccine (1 of 1 - Risk 1-dose series) Never done    Meningococcal (ACWY) vaccine (1 - Risk 2-dose series) Never done    Meningococcal B vaccine (1 of 5 - Increased Risk) Never done    Varicella vaccine (2 of 2 - 2-dose childhood series) 11/18/2000    Pneumococcal 0-49 years Vaccine (2 of 2 - PCV) 02/25/2020    Flu vaccine (1) 08/30/2025 (Originally 8/1/2024)    COVID-19 Vaccine (1 - 2024-25 season) 12/10/2025 (Originally 9/1/2024)    Depression Monitoring  03/11/2026    Diabetes screen  12/10/2027    DTaP/Tdap/Td vaccine (3 - Td or Tdap) 09/04/2032    Shingles vaccine (1 of 2) 03/11/2039    Hepatitis C screen  Completed    HIV screen  Completed    Hepatitis A vaccine  Aged Out    HPV vaccine  Aged Out    Polio vaccine  Aged Out    Hepatitis B vaccine  Discontinued       Hemoglobin A1C (%)   Date Value   12/10/2024 5.2             ( goal A1C is < 7)   No components found for: \"LABMICR\"  No components found for: \"LDLCHOLESTEROL\", \"LDLCALC\"    (goal LDL is <100)   AST (U/L)   Date Value   04/01/2024 28     ALT (U/L)   Date Value   04/01/2024 35     BUN (mg/dL)   Date Value   04/01/2024 12     BP Readings from Last 3 Encounters:   03/11/25 120/70   12/10/24 110/70   12/03/24 113/68          (goal 120/80)    All Future Testing planned in [facility]  Lab Frequency Next Occurrence   PTH, Intact Once 04/04/2024               Patient Active Problem List:     Respiratory insufficiency     Traumatic brain injury (HCC)     PTSD (post-traumatic stress disorder)     Suicidal ideation     Depression     Opioid abuse, in remission (HCC)     Left

## 2025-04-07 ENCOUNTER — TELEPHONE (OUTPATIENT)
Dept: FAMILY MEDICINE CLINIC | Age: 35
End: 2025-04-07

## 2025-04-07 NOTE — TELEPHONE ENCOUNTER
Patient called in requesting Zofran. States last night he started getting sick and vomiting. States he hasn't been able to to eat or drink. States he can only take a couple sips of water.  Patient states he has been collapsing on the floor.  Explained to patient it may be best that he goes to the ER so he doesn't not get dehydrated.   Patient ask that a message still be sent to provider. States he can let us know tomorrow how he is feeling.    Patient has appt Wednesday for vivitrol only. Did let him know we can schedule him an appt for Thursday to see the provider and do his vivitrol.

## 2025-04-09 ENCOUNTER — CLINICAL SUPPORT (OUTPATIENT)
Dept: FAMILY MEDICINE CLINIC | Age: 35
End: 2025-04-09
Payer: COMMERCIAL

## 2025-04-09 DIAGNOSIS — F10.11 ALCOHOL ABUSE, IN REMISSION: Primary | ICD-10-CM

## 2025-04-09 LAB
ALCOHOL URINE: ABNORMAL
AMPHETAMINE SCREEN URINE: POSITIVE
BARBITURATE SCREEN URINE: ABNORMAL
BENZODIAZEPINE SCREEN, URINE: ABNORMAL
BUPRENORPHINE URINE: ABNORMAL
COCAINE METABOLITE SCREEN URINE: ABNORMAL
FENTANYL SCREEN, URINE: ABNORMAL
GABAPENTIN SCREEN, URINE: ABNORMAL
MDMA, URINE: ABNORMAL
METHADONE SCREEN, URINE: ABNORMAL
METHAMPHETAMINE, URINE: ABNORMAL
OPIATE SCREEN URINE: ABNORMAL
OXYCODONE SCREEN URINE: ABNORMAL
PHENCYCLIDINE SCREEN URINE: ABNORMAL
PROPOXYPHENE SCREEN, URINE: ABNORMAL
SYNTHETIC CANNABINOIDS(K2) SCREEN, URINE: ABNORMAL
THC SCREEN, URINE: ABNORMAL
TRAMADOL SCREEN URINE: ABNORMAL
TRICYCLIC ANTIDEPRESSANTS, UR: ABNORMAL

## 2025-04-09 PROCEDURE — 80305 DRUG TEST PRSMV DIR OPT OBS: CPT | Performed by: INTERNAL MEDICINE

## 2025-04-09 PROCEDURE — 96372 THER/PROPH/DIAG INJ SC/IM: CPT | Performed by: INTERNAL MEDICINE

## 2025-04-09 NOTE — PROGRESS NOTES
After obtaining consent, and per orders of Dr. Leger, injection of vivitrol given in Right vastus lateralis by Xiomara Apodaca MA. Patient instructed to remain in clinic for 20 minutes afterwards, and to report any adverse reaction to me immediately.    Patient supplied medication.

## 2025-05-14 DIAGNOSIS — F90.2 ATTENTION DEFICIT HYPERACTIVITY DISORDER (ADHD), COMBINED TYPE: ICD-10-CM

## 2025-05-15 RX ORDER — DEXTROAMPHETAMINE SACCHARATE, AMPHETAMINE ASPARTATE, DEXTROAMPHETAMINE SULFATE AND AMPHETAMINE SULFATE 5; 5; 5; 5 MG/1; MG/1; MG/1; MG/1
1 TABLET ORAL 2 TIMES DAILY
Qty: 60 TABLET | Refills: 0 | Status: SHIPPED | OUTPATIENT
Start: 2025-05-15 | End: 2025-06-14

## 2025-05-22 ENCOUNTER — APPOINTMENT (OUTPATIENT)
Dept: GENERAL RADIOLOGY | Facility: CLINIC | Age: 35
End: 2025-05-22
Payer: COMMERCIAL

## 2025-05-22 ENCOUNTER — HOSPITAL ENCOUNTER (EMERGENCY)
Facility: CLINIC | Age: 35
Discharge: HOME | End: 2025-05-22
Attending: EMERGENCY MEDICINE
Payer: COMMERCIAL

## 2025-05-22 ENCOUNTER — APPOINTMENT (OUTPATIENT)
Dept: CT IMAGING | Facility: CLINIC | Age: 35
End: 2025-05-22
Payer: COMMERCIAL

## 2025-05-22 VITALS
HEIGHT: 74 IN | WEIGHT: 210 LBS | HEART RATE: 110 BPM | BODY MASS INDEX: 26.95 KG/M2 | TEMPERATURE: 98.1 F | RESPIRATION RATE: 18 BRPM | OXYGEN SATURATION: 97 % | SYSTOLIC BLOOD PRESSURE: 141 MMHG | DIASTOLIC BLOOD PRESSURE: 102 MMHG

## 2025-05-22 DIAGNOSIS — J34.0: Primary | ICD-10-CM

## 2025-05-22 DIAGNOSIS — L03.213 PRESEPTAL CELLULITIS: ICD-10-CM

## 2025-05-22 DIAGNOSIS — M25.561 ACUTE PAIN OF RIGHT KNEE: ICD-10-CM
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
## 2025-05-22 LAB
ALBUMIN SERPL-MCNC: 4.1 G/DL (ref 3.5–5.2)
ALBUMIN/GLOB SERPL: 1.5 {RATIO}
ALP SERPL-CCNC: 78 U/L (ref 40–129)
ALT SERPL-CCNC: 25 U/L (ref 10–50)
ANION GAP SERPL CALCULATED.3IONS-SCNC: 11 MMOL/L (ref 9–16)
AST SERPL-CCNC: 56 U/L (ref 10–50)
BASOPHILS # BLD: 0 K/UL (ref 0–0.2)
BASOPHILS NFR BLD: 0 % (ref 0–2)
BILIRUB SERPL-MCNC: 0.7 MG/DL (ref 0–1.2)
BUN SERPL-MCNC: 8 MG/DL (ref 6–20)
CALCIUM SERPL-MCNC: 8.3 MG/DL (ref 8.6–10.4)
CHLORIDE SERPL-SCNC: 99 MMOL/L (ref 98–107)
CO2 SERPL-SCNC: 25 MMOL/L (ref 20–31)
CREAT SERPL-MCNC: 0.8 MG/DL (ref 0.7–1.2)
EOSINOPHIL # BLD: 0.25 K/UL (ref 0–0.4)
EOSINOPHILS RELATIVE PERCENT: 2 % (ref 1–4)
ERYTHROCYTE [DISTWIDTH] IN BLOOD BY AUTOMATED COUNT: 13.6 % (ref 12.5–15.4)
GFR, ESTIMATED: >90 ML/MIN/1.73M2
GLUCOSE SERPL-MCNC: 87 MG/DL (ref 74–99)
HCT VFR BLD AUTO: 35 % (ref 41–53)
HGB BLD-MCNC: 12 G/DL (ref 13.5–17.5)
LACTATE BLDV-SCNC: 1.6 MMOL/L (ref 0.5–2.2)
LYMPHOCYTES NFR BLD: 2.83 K/UL (ref 1–4.8)
LYMPHOCYTES RELATIVE PERCENT: 23 % (ref 24–44)
MCH RBC QN AUTO: 30.9 PG (ref 26–34)
MCHC RBC AUTO-ENTMCNC: 34.2 G/DL (ref 31–37)
MCV RBC AUTO: 90.4 FL (ref 80–100)
MONOCYTES NFR BLD: 1.35 K/UL (ref 0.1–0.8)
MONOCYTES NFR BLD: 11 % (ref 1–7)
MORPHOLOGY: NORMAL
NEUTROPHILS NFR BLD: 64 % (ref 36–66)
NEUTS SEG NFR BLD: 7.87 K/UL (ref 1.8–7.7)
PLATELET # BLD AUTO: 353 K/UL (ref 140–450)
PMV BLD AUTO: 8.4 FL (ref 6–12)
POTASSIUM SERPL-SCNC: 3.4 MMOL/L (ref 3.7–5.3)
PROT SERPL-MCNC: 6.9 G/DL (ref 6.6–8.7)
RBC # BLD AUTO: 3.87 M/UL (ref 4.5–5.9)
SODIUM SERPL-SCNC: 135 MMOL/L (ref 136–145)
T4 FREE SERPL-MCNC: 0.5 NG/DL (ref 0.92–1.68)
TSH SERPL DL<=0.05 MIU/L-ACNC: 37 UIU/ML (ref 0.27–4.2)
WBC OTHER # BLD: 12.3 K/UL (ref 3.5–11)

## 2025-05-22 PROCEDURE — 2580000003 HC RX 258

## 2025-05-22 PROCEDURE — 96365 THER/PROPH/DIAG IV INF INIT: CPT

## 2025-05-22 PROCEDURE — 83605 ASSAY OF LACTIC ACID: CPT

## 2025-05-22 PROCEDURE — 2500000003 HC RX 250 WO HCPCS

## 2025-05-22 PROCEDURE — 6370000000 HC RX 637 (ALT 250 FOR IP)

## 2025-05-22 PROCEDURE — 73564 X-RAY EXAM KNEE 4 OR MORE: CPT

## 2025-05-22 PROCEDURE — 87040 BLOOD CULTURE FOR BACTERIA: CPT

## 2025-05-22 PROCEDURE — 84443 ASSAY THYROID STIM HORMONE: CPT

## 2025-05-22 PROCEDURE — 80053 COMPREHEN METABOLIC PANEL: CPT

## 2025-05-22 PROCEDURE — 99285 EMERGENCY DEPT VISIT HI MDM: CPT

## 2025-05-22 PROCEDURE — 96366 THER/PROPH/DIAG IV INF ADDON: CPT

## 2025-05-22 PROCEDURE — 36415 COLL VENOUS BLD VENIPUNCTURE: CPT

## 2025-05-22 PROCEDURE — 6360000002 HC RX W HCPCS

## 2025-05-22 PROCEDURE — 85025 COMPLETE CBC W/AUTO DIFF WBC: CPT

## 2025-05-22 PROCEDURE — 70487 CT MAXILLOFACIAL W/DYE: CPT

## 2025-05-22 PROCEDURE — 6360000004 HC RX CONTRAST MEDICATION

## 2025-05-22 PROCEDURE — 84439 ASSAY OF FREE THYROXINE: CPT

## 2025-05-22 PROCEDURE — 96375 TX/PRO/DX INJ NEW DRUG ADDON: CPT

## 2025-05-22 PROCEDURE — 96368 THER/DIAG CONCURRENT INF: CPT

## 2025-05-22 RX ORDER — SULFAMETHOXAZOLE AND TRIMETHOPRIM 800; 160 MG/1; MG/1
1 TABLET ORAL 2 TIMES DAILY
Qty: 20 TABLET | Refills: 0 | Status: SHIPPED | OUTPATIENT
Start: 2025-05-22 | End: 2025-06-01

## 2025-05-22 RX ORDER — SODIUM CHLORIDE 0.9 % (FLUSH) 0.9 %
10 SYRINGE (ML) INJECTION PRN
Status: DISCONTINUED | OUTPATIENT
Start: 2025-05-22 | End: 2025-05-22 | Stop reason: HOSPADM

## 2025-05-22 RX ORDER — 0.9 % SODIUM CHLORIDE 0.9 %
80 INTRAVENOUS SOLUTION INTRAVENOUS ONCE
Status: DISCONTINUED | OUTPATIENT
Start: 2025-05-22 | End: 2025-05-22 | Stop reason: HOSPADM

## 2025-05-22 RX ORDER — LORAZEPAM 2 MG/ML
1 INJECTION INTRAMUSCULAR ONCE
Status: COMPLETED | OUTPATIENT
Start: 2025-05-22 | End: 2025-05-22

## 2025-05-22 RX ORDER — IOPAMIDOL 755 MG/ML
75 INJECTION, SOLUTION INTRAVASCULAR
Status: COMPLETED | OUTPATIENT
Start: 2025-05-22 | End: 2025-05-22

## 2025-05-22 RX ORDER — MIDAZOLAM HYDROCHLORIDE 2 MG/2ML
4 INJECTION, SOLUTION INTRAMUSCULAR; INTRAVENOUS ONCE
Status: DISCONTINUED | OUTPATIENT
Start: 2025-05-22 | End: 2025-05-22

## 2025-05-22 RX ORDER — POTASSIUM CHLORIDE 1500 MG/1
20 TABLET, EXTENDED RELEASE ORAL ONCE
Status: COMPLETED | OUTPATIENT
Start: 2025-05-22 | End: 2025-05-22

## 2025-05-22 RX ORDER — ACETAMINOPHEN 500 MG
1000 TABLET ORAL ONCE
Status: DISCONTINUED | OUTPATIENT
Start: 2025-05-22 | End: 2025-05-22 | Stop reason: HOSPADM

## 2025-05-22 RX ORDER — DIPHENHYDRAMINE HYDROCHLORIDE 50 MG/ML
50 INJECTION, SOLUTION INTRAMUSCULAR; INTRAVENOUS ONCE
Status: DISCONTINUED | OUTPATIENT
Start: 2025-05-22 | End: 2025-05-22

## 2025-05-22 RX ORDER — HALOPERIDOL 5 MG/ML
5 INJECTION INTRAMUSCULAR ONCE
Status: DISCONTINUED | OUTPATIENT
Start: 2025-05-22 | End: 2025-05-22

## 2025-05-22 RX ORDER — KETOROLAC TROMETHAMINE 15 MG/ML
15 INJECTION, SOLUTION INTRAMUSCULAR; INTRAVENOUS ONCE
Status: COMPLETED | OUTPATIENT
Start: 2025-05-22 | End: 2025-05-22

## 2025-05-22 RX ORDER — WATER 10 ML/10ML
INJECTION INTRAMUSCULAR; INTRAVENOUS; SUBCUTANEOUS
Status: DISCONTINUED
Start: 2025-05-22 | End: 2025-05-22 | Stop reason: WASHOUT

## 2025-05-22 RX ORDER — 0.9 % SODIUM CHLORIDE 0.9 %
1000 INTRAVENOUS SOLUTION INTRAVENOUS ONCE
Status: COMPLETED | OUTPATIENT
Start: 2025-05-22 | End: 2025-05-22

## 2025-05-22 RX ORDER — CEFDINIR 300 MG/1
300 CAPSULE ORAL 2 TIMES DAILY
Qty: 20 CAPSULE | Refills: 0 | Status: SHIPPED | OUTPATIENT
Start: 2025-05-22 | End: 2025-06-01

## 2025-05-22 RX ADMIN — Medication 100 ML: at 14:19

## 2025-05-22 RX ADMIN — CEFEPIME 1000 MG: 1 INJECTION, POWDER, FOR SOLUTION INTRAMUSCULAR; INTRAVENOUS at 13:43

## 2025-05-22 RX ADMIN — IOPAMIDOL 75 ML: 755 INJECTION, SOLUTION INTRAVENOUS at 14:19

## 2025-05-22 RX ADMIN — SODIUM CHLORIDE, PRESERVATIVE FREE 10 ML: 5 INJECTION INTRAVENOUS at 14:20

## 2025-05-22 RX ADMIN — POTASSIUM CHLORIDE 20 MEQ: 1500 TABLET, EXTENDED RELEASE ORAL at 13:56

## 2025-05-22 RX ADMIN — LORAZEPAM 1 MG: 2 INJECTION INTRAMUSCULAR; INTRAVENOUS at 15:02

## 2025-05-22 RX ADMIN — SODIUM CHLORIDE 1000 ML: 0.9 INJECTION, SOLUTION INTRAVENOUS at 13:43

## 2025-05-22 RX ADMIN — KETOROLAC TROMETHAMINE 15 MG: 15 INJECTION, SOLUTION INTRAMUSCULAR; INTRAVENOUS at 14:59

## 2025-05-22 RX ADMIN — VANCOMYCIN HYDROCHLORIDE 2500 MG: 1 INJECTION, POWDER, LYOPHILIZED, FOR SOLUTION INTRAVENOUS at 13:59

## 2025-05-22 ASSESSMENT — ENCOUNTER SYMPTOMS
FACIAL SWELLING: 1
TROUBLE SWALLOWING: 0
SHORTNESS OF BREATH: 0
ABDOMINAL PAIN: 0
CONSTIPATION: 0
EYE PAIN: 0
EYE REDNESS: 0
COLOR CHANGE: 1
SORE THROAT: 0
VOMITING: 0
NAUSEA: 0
PHOTOPHOBIA: 1
SINUS PAIN: 0
DIARRHEA: 0
CHEST TIGHTNESS: 0
COUGH: 0
VOICE CHANGE: 0
RHINORRHEA: 1
BACK PAIN: 0

## 2025-05-22 ASSESSMENT — PAIN SCALES - GENERAL
PAINLEVEL_OUTOF10: 4
PAINLEVEL_OUTOF10: 10

## 2025-05-22 ASSESSMENT — PAIN - FUNCTIONAL ASSESSMENT: PAIN_FUNCTIONAL_ASSESSMENT: 0-10

## 2025-05-22 ASSESSMENT — PAIN DESCRIPTION - LOCATION: LOCATION: KNEE

## 2025-05-22 ASSESSMENT — PAIN DESCRIPTION - PAIN TYPE: TYPE: ACUTE PAIN

## 2025-05-22 ASSESSMENT — PAIN DESCRIPTION - ORIENTATION: ORIENTATION: RIGHT
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
## 2025-05-22 NOTE — CONSULTS
Pharmacy dosing of initial vancomycin ordered by ED provider.    Wt Readings from Last 1 Encounters:   05/22/25 95.3 kg (210 lb)       2500 mg ordered x 1.    Pharmacy is waiting to see if vancomycin therapy is continued or stopped/changed by the admitting physician.

## 2025-05-22 NOTE — ED PROVIDER NOTES
[facility]  [address] [facility] [address]  Phone: [phone]        Pt Name: Dania Jay  MRN: [id number]  Birthdate 1990  Date of evaluation: 5/22/25    CHIEFCOMPLAINT       Chief Complaint   Patient presents with   • Facial Swelling   • Cyst     Popped three days ago. Caused swelling today up to eyes used cleaned with peroxide while at home. Soaked it for \" a long time\"    • Knee Injury     Fell because he could not see d/t SWELLING R Knee some redness       HISTORY OF PRESENT ILLNESS (Location/Symptom, Timing/Onset, Context/Setting, Quality, Duration, Modifying Factors, Severity)      Dania Jay is a 35 y.o. male with Graves' disease s/p thyroidectomy 7/15/2024 with incidental finding of incidental finding of papillary thyroid carcinoma on 175 mcg of Synthroid, hypertension taking 10 mg lisinopril depression taking Zoloft and trazodone, ADD taking Adderall, psoriasis, bipolar 2 disorder, exophthalmos, polysubstance abuse in remission, splenectomy, internal carotid aneurysm pertinent PMH who presents to the ED via private auto with complaint of wound to his nose for the last two days. Patient states he tried to \"pop\" an abscess on his nose and felt the abscess pop and drain \"under his skin\". Patient then soaked his nose in hydrogen peroxide which he reports created a large wound. Patient denies fever, chills, nausea, vomiting, chest pain, shortness of breath.  No headache, blurred or double vision.  Patient does have exophthalmos from history of Graves' disease s/p thyroidectomy.  Patient reports he is not compliant with his Synthroid, takes every 2 to 3 days.  Last dose about 3 days ago.  Patient states he cannot see very well without his glasses and does not wear his glasses here.  Noticed swelling in the eyes significantly worse this morning upon waking.  History of polysubstance abuse, would like to avoid any opioid containing medications or narcotics.  Patient also

## 2025-05-22 NOTE — DISCHARGE INSTRUCTIONS
Please go to any emergency department for admission with any new or worsening symptoms.     You have a severe infection of the soft tissues surrounding your eyes and face. We have recommended that you stay for further evaluation and IV antibiotics, but you have chosen to leave against medical advice. You have voiced your understanding of your condition and the possibility of death and are still choosing to leave without further testing.     Oral antibiotics were called into your pharmacy, these are not as effective as IV antibiotics and may not work. Take as prescribed until completed.    For pain, take tylenol and or ibuprofen over the counter, as needed and as directed for pain.    You should return immediately to the Emergency Department for any loss of consciousness, shaking of your arms or legs (convulsions), biting your tongue or urinary incontinence, nausea / vomiting, fever > 101.5, any other care or concern.

## 2025-05-25 LAB
MICROORGANISM SPEC CULT: NORMAL
MICROORGANISM SPEC CULT: NORMAL
SERVICE CMNT-IMP: NORMAL
SERVICE CMNT-IMP: NORMAL
SPECIMEN DESCRIPTION: NORMAL
SPECIMEN DESCRIPTION: NORMAL

## 2025-05-27 NOTE — TELEPHONE ENCOUNTER
Last visit: 3/11/25  Last Med refill: 5/6/25  Does patient have enough medication for 72 hours: unknown, electronic request    Next Visit Date:  Future Appointments   Date Time Provider Department Center   6/9/2025 11:15 AM Vida Serna, APRN - CNP [facility] DEP       Health Maintenance   Topic Date Due    Hib vaccine (1 of 1 - Risk 1-dose series) Never done    Meningococcal (ACWY) vaccine (1 - Risk 2-dose series) Never done    Meningococcal B vaccine (1 of 5 - Increased Risk) Never done    Varicella vaccine (2 of 2 - 2-dose childhood series) 11/18/2000    Pneumococcal 0-49 years Vaccine (2 of 2 - PCV) 02/25/2020    Flu vaccine (Season Ended) 08/30/2025 (Originally 8/1/2025)    COVID-19 Vaccine (1 - 2024-25 season) 12/10/2025 (Originally 9/1/2024)    Depression Monitoring  03/11/2026    DTaP/Tdap/Td vaccine (3 - Td or Tdap) 09/04/2032    Shingles vaccine (1 of 2) 03/11/2039    Hepatitis C screen  Completed    HIV screen  Completed    Hepatitis A vaccine  Aged Out    HPV vaccine  Aged Out    Polio vaccine  Aged Out    Hepatitis B vaccine  Discontinued    Diabetes screen  Discontinued       Hemoglobin A1C (%)   Date Value   12/10/2024 5.2             ( goal A1C is < 7)   No components found for: \"LABMICR\"  No components found for: \"LDLCHOLESTEROL\", \"LDLCALC\"    (goal LDL is <100)   AST (U/L)   Date Value   05/22/2025 56 (H)     ALT (U/L)   Date Value   05/22/2025 25     BUN (mg/dL)   Date Value   05/22/2025 8     BP Readings from Last 3 Encounters:   05/22/25 (!) 141/102   03/11/25 120/70   12/10/24 110/70          (goal 120/80)    All Future Testing planned in [facility]  Lab Frequency Next Occurrence               Patient Active Problem List:     Respiratory insufficiency     Traumatic brain injury (HCC)     PTSD (post-traumatic stress disorder)     Suicidal ideation     Depression     Opioid abuse, in remission (HCC)     Left Pseudoaneurysm of carotid artery (HCC)     Depression, major,

## 2025-05-28 RX ORDER — NALTREXONE 380 MG
KIT INTRAMUSCULAR
Qty: 1 EACH | Refills: 5 | Status: SHIPPED | OUTPATIENT
Start: 2025-05-28

## 2025-06-07 DIAGNOSIS — Z76.0 MEDICATION REFILL: ICD-10-CM

## 2025-06-09 RX ORDER — GABAPENTIN 600 MG/1
TABLET ORAL
Qty: 120 TABLET | Refills: 0 | Status: SHIPPED | OUTPATIENT
Start: 2025-06-09 | End: 2025-09-07

## 2025-06-09 NOTE — TELEPHONE ENCOUNTER
Last visit: 3/11/25  Last Med refill: 3/11/25  Does patient have enough medication for 72 hours: No:     Next Visit Date:  No future appointments.    Health Maintenance   Topic Date Due    Hib vaccine (1 of 1 - Risk 1-dose series) Never done    Meningococcal (ACWY) vaccine (1 - Risk 2-dose series) Never done    Meningococcal B vaccine (1 of 5 - Increased Risk) Never done    Varicella vaccine (2 of 2 - 2-dose childhood series) 11/18/2000    Pneumococcal 0-49 years Vaccine (2 of 2 - PCV) 02/25/2020    Flu vaccine (Season Ended) 08/30/2025 (Originally 8/1/2025)    COVID-19 Vaccine (1 - 2024-25 season) 12/10/2025 (Originally 9/1/2024)    Depression Monitoring  03/11/2026    DTaP/Tdap/Td vaccine (3 - Td or Tdap) 09/04/2032    Shingles vaccine (1 of 2) 03/11/2039    Hepatitis C screen  Completed    HIV screen  Completed    Hepatitis A vaccine  Aged Out    HPV vaccine  Aged Out    Polio vaccine  Aged Out    Hepatitis B vaccine  Discontinued    Diabetes screen  Discontinued       Hemoglobin A1C (%)   Date Value   12/10/2024 5.2             ( goal A1C is < 7)   No components found for: \"LABMICR\"  No components found for: \"LDLCHOLESTEROL\", \"LDLCALC\"    (goal LDL is <100)   AST (U/L)   Date Value   05/22/2025 56 (H)     ALT (U/L)   Date Value   05/22/2025 25     BUN (mg/dL)   Date Value   05/22/2025 8     BP Readings from Last 3 Encounters:   05/22/25 (!) 141/102   03/11/25 120/70   12/10/24 110/70          (goal 120/80)    All Future Testing planned in [facility]  Lab Frequency Next Occurrence               Patient Active Problem List:     Respiratory insufficiency     Traumatic brain injury (HCC)     PTSD (post-traumatic stress disorder)     Suicidal ideation     Depression     Opioid abuse, in remission (HCC)     Left Pseudoaneurysm of carotid artery (HCC)     Depression, major, recurrent     Status post angioplasty with stent     Pseudoaneurysm

## 2025-06-23 ENCOUNTER — OFFICE VISIT (OUTPATIENT)
Dept: FAMILY MEDICINE CLINIC | Age: 35
End: 2025-06-23
Payer: COMMERCIAL

## 2025-06-23 VITALS
HEART RATE: 95 BPM | SYSTOLIC BLOOD PRESSURE: 134 MMHG | DIASTOLIC BLOOD PRESSURE: 84 MMHG | WEIGHT: 201 LBS | OXYGEN SATURATION: 98 % | BODY MASS INDEX: 25.81 KG/M2

## 2025-06-23 DIAGNOSIS — I10 PRIMARY HYPERTENSION: ICD-10-CM

## 2025-06-23 DIAGNOSIS — Z76.0 MEDICATION REFILL: ICD-10-CM

## 2025-06-23 DIAGNOSIS — F90.2 ATTENTION DEFICIT HYPERACTIVITY DISORDER (ADHD), COMBINED TYPE: Primary | ICD-10-CM

## 2025-06-23 DIAGNOSIS — E03.9 HYPOTHYROIDISM, UNSPECIFIED TYPE: ICD-10-CM

## 2025-06-23 PROCEDURE — 99214 OFFICE O/P EST MOD 30 MIN: CPT | Performed by: NURSE PRACTITIONER

## 2025-06-23 PROCEDURE — 3079F DIAST BP 80-89 MM HG: CPT | Performed by: NURSE PRACTITIONER

## 2025-06-23 PROCEDURE — 1036F TOBACCO NON-USER: CPT | Performed by: NURSE PRACTITIONER

## 2025-06-23 PROCEDURE — G8417 CALC BMI ABV UP PARAM F/U: HCPCS | Performed by: NURSE PRACTITIONER

## 2025-06-23 PROCEDURE — 3075F SYST BP GE 130 - 139MM HG: CPT | Performed by: NURSE PRACTITIONER

## 2025-06-23 PROCEDURE — G8427 DOCREV CUR MEDS BY ELIG CLIN: HCPCS | Performed by: NURSE PRACTITIONER

## 2025-06-23 RX ORDER — DEXTROAMPHETAMINE SACCHARATE, AMPHETAMINE ASPARTATE, DEXTROAMPHETAMINE SULFATE AND AMPHETAMINE SULFATE 5; 5; 5; 5 MG/1; MG/1; MG/1; MG/1
20 TABLET ORAL 2 TIMES DAILY
Qty: 60 TABLET | Refills: 0 | Status: SHIPPED | OUTPATIENT
Start: 2025-06-23 | End: 2025-07-23

## 2025-06-23 RX ORDER — DEXTROAMPHETAMINE SACCHARATE, AMPHETAMINE ASPARTATE, DEXTROAMPHETAMINE SULFATE AND AMPHETAMINE SULFATE 5; 5; 5; 5 MG/1; MG/1; MG/1; MG/1
20 TABLET ORAL 2 TIMES DAILY
Qty: 60 TABLET | Refills: 0 | Status: SHIPPED | OUTPATIENT
Start: 2025-07-23 | End: 2025-08-22

## 2025-06-23 RX ORDER — LISINOPRIL 10 MG/1
TABLET ORAL
Qty: 180 TABLET | Refills: 1 | Status: SHIPPED | OUTPATIENT
Start: 2025-06-23

## 2025-06-23 RX ORDER — SERTRALINE HYDROCHLORIDE 100 MG/1
TABLET, FILM COATED ORAL
Qty: 180 TABLET | Refills: 1 | Status: SHIPPED | OUTPATIENT
Start: 2025-06-23

## 2025-06-23 RX ORDER — DEXTROAMPHETAMINE SACCHARATE, AMPHETAMINE ASPARTATE, DEXTROAMPHETAMINE SULFATE AND AMPHETAMINE SULFATE 5; 5; 5; 5 MG/1; MG/1; MG/1; MG/1
20 TABLET ORAL 2 TIMES DAILY
Qty: 60 TABLET | Refills: 0 | Status: SHIPPED | OUTPATIENT
Start: 2025-08-22 | End: 2025-09-21

## 2025-06-23 ASSESSMENT — ENCOUNTER SYMPTOMS
SHORTNESS OF BREATH: 0
COUGH: 0

## 2025-06-23 NOTE — PROGRESS NOTES
Dania Jay (:  1990) is a 35 y.o. male,Established patient, here for evaluation of the following chief complaint(s):  ADHD and 3 Month Follow-Up    Assessment & Plan  Medication refill       Orders:    sertraline (ZOLOFT) 100 MG tablet; take 2 tablets by mouth once daily at bedtime    Attention deficit hyperactivity disorder (ADHD), combined type   Chronic, at goal (stable), continue current treatment plan    Orders:    amphetamine-dextroamphetamine (ADDERALL) 20 MG tablet; Take 1 tablet by mouth 2 times daily for 30 days. Max Daily Amount: 40 mg    amphetamine-dextroamphetamine (ADDERALL) 20 MG tablet; Take 1 tablet by mouth 2 times daily for 30 days. Max Daily Amount: 40 mg    amphetamine-dextroamphetamine (ADDERALL) 20 MG tablet; Take 1 tablet by mouth 2 times daily for 30 days. Max Daily Amount: 40 mg    Hypothyroidism, unspecified type    I have encouraged dania to follow up with endo.  Needs to be taking meds daily.          Primary hypertension   Chronic, at goal (stable), continue current treatment plan    Orders:    lisinopril (PRINIVIL;ZESTRIL) 10 MG tablet; take 1 tablet by mouth every morning and at bedtime      No follow-ups on file.       Subjective   ADHD  This is a chronic problem. Associated symptoms include fatigue. Pertinent negatives include no chest pain, chills, coughing, fever or myalgias.     Hasn't taken his levothyroxine for a few days.  Doesn't feel well.  Most recnet tsh on may 22 was 37.  I have encouraged him to follow with endo.  He has been experiencing a lot of fatigue, he states today is actually a little better.  No fever.   No other sxs.     Review of Systems   Constitutional:  Positive for fatigue. Negative for chills and fever.   Respiratory:  Negative for cough and shortness of breath.    Cardiovascular:  Negative for chest pain and leg swelling.   Musculoskeletal:  Negative for myalgias.     Objective   Vitals:    25 1447   BP: 134/84   Pulse: 95   SpO2: 98%

## 2025-07-06 DIAGNOSIS — Z76.0 MEDICATION REFILL: ICD-10-CM

## 2025-07-07 RX ORDER — GABAPENTIN 600 MG/1
TABLET ORAL
Qty: 120 TABLET | Refills: 2 | Status: SHIPPED | OUTPATIENT
Start: 2025-07-07 | End: 2025-08-06

## 2025-07-07 NOTE — TELEPHONE ENCOUNTER
Last visit: 6/23/25  Last Med refill: 6/9/25  Does patient have enough medication for 72 hours: No:     Next Visit Date:  Future Appointments   Date Time Provider Department Center   9/23/2025  1:30 PM Vida Serna, APRN - CNP [facility] DEP       Health Maintenance   Topic Date Due    Hib vaccine (1 of 1 - Risk 1-dose series) Never done    Meningococcal (ACWY) vaccine (1 - Risk 2-dose series) Never done    Meningococcal B vaccine (1 of 5 - Increased Risk) Never done    Varicella vaccine (2 of 2 - 2-dose childhood series) 11/18/2000    Pneumococcal 0-49 years Vaccine (2 of 2 - PCV) 02/25/2020    COVID-19 Vaccine (1 - 2024-25 season) 12/10/2025 (Originally 9/1/2024)    Flu vaccine (1) 08/01/2025    Depression Monitoring  03/11/2026    DTaP/Tdap/Td vaccine (3 - Td or Tdap) 09/04/2032    Shingles vaccine (1 of 2) 03/11/2039    Hepatitis C screen  Completed    HIV screen  Completed    Hepatitis A vaccine  Aged Out    HPV vaccine  Aged Out    Polio vaccine  Aged Out    Hepatitis B vaccine  Discontinued    Diabetes screen  Discontinued       Hemoglobin A1C (%)   Date Value   12/10/2024 5.2             ( goal A1C is < 7)   No components found for: \"LABMICR\"  No components found for: \"LDLCHOLESTEROL\", \"LDLCALC\"    (goal LDL is <100)   AST (U/L)   Date Value   05/22/2025 56 (H)     ALT (U/L)   Date Value   05/22/2025 25     BUN (mg/dL)   Date Value   05/22/2025 8     BP Readings from Last 3 Encounters:   06/23/25 134/84   05/22/25 (!) 141/102   03/11/25 120/70          (goal 120/80)    All Future Testing planned in [facility]  Lab Frequency Next Occurrence               Patient Active Problem List:     Respiratory insufficiency     Traumatic brain injury (HCC)     PTSD (post-traumatic stress disorder)     Suicidal ideation     Depression     Opioid abuse, in remission (HCC)     Left Pseudoaneurysm of carotid artery (HCC)     Depression, major, recurrent     Status post angioplasty with stent

## 2025-08-11 RX ORDER — OMEPRAZOLE 20 MG/1
20 CAPSULE, DELAYED RELEASE ORAL NIGHTLY
Qty: 90 CAPSULE | Refills: 1 | Status: SHIPPED | OUTPATIENT
Start: 2025-08-11